# Patient Record
Sex: FEMALE | Race: OTHER | Employment: FULL TIME | URBAN - METROPOLITAN AREA
[De-identification: names, ages, dates, MRNs, and addresses within clinical notes are randomized per-mention and may not be internally consistent; named-entity substitution may affect disease eponyms.]

---

## 2017-01-18 RX ORDER — CHOLECALCIFEROL (VITAMIN D3) 1250 MCG
TABLET ORAL
Qty: 4 TABLET | Refills: 0 | OUTPATIENT
Start: 2017-01-18

## 2017-03-01 ENCOUNTER — OFFICE VISIT (OUTPATIENT)
Dept: OBGYN CLINIC | Facility: CLINIC | Age: 52
End: 2017-03-01

## 2017-03-01 VITALS
HEIGHT: 65 IN | DIASTOLIC BLOOD PRESSURE: 84 MMHG | SYSTOLIC BLOOD PRESSURE: 122 MMHG | WEIGHT: 182 LBS | BODY MASS INDEX: 30.32 KG/M2

## 2017-03-01 DIAGNOSIS — Z12.31 VISIT FOR SCREENING MAMMOGRAM: Primary | ICD-10-CM

## 2017-03-01 DIAGNOSIS — Z01.419 ENCOUNTER FOR GYNECOLOGICAL EXAMINATION WITHOUT ABNORMAL FINDING: ICD-10-CM

## 2017-03-01 PROCEDURE — 99396 PREV VISIT EST AGE 40-64: CPT | Performed by: OBSTETRICS & GYNECOLOGY

## 2017-03-02 NOTE — PROGRESS NOTES
HPI:    Patient ID: Evelyn Queen is a 49year old female. HPI  Patient here for routine exam.  No C/Os. Needs mammogram order. LPS 2015 WNL per patient. Recently tested and patient is in menopause.     Review of Systems   Constitutional: Negative Visit for screening mammogram  (primary encounter diagnosis)  Encounter for gynecological examination without abnormal finding [z01.419]   F/U Labs. Encouraged monthly SBE. Discussed diet and exercise.   No orders of the defined types were placed in [facility]

## 2017-03-10 ENCOUNTER — HOSPITAL ENCOUNTER (EMERGENCY)
Facility: HOSPITAL | Age: 52
Discharge: HOME | End: 2017-03-10
Attending: EMERGENCY MEDICINE
Payer: COMMERCIAL

## 2017-03-10 ENCOUNTER — APPOINTMENT (OUTPATIENT)
Dept: CT IMAGING | Facility: HOSPITAL | Age: 52
End: 2017-03-10
Attending: EMERGENCY MEDICINE
Payer: COMMERCIAL

## 2017-03-10 VITALS
OXYGEN SATURATION: 99 % | WEIGHT: 178 LBS | TEMPERATURE: 98 F | DIASTOLIC BLOOD PRESSURE: 80 MMHG | HEART RATE: 78 BPM | SYSTOLIC BLOOD PRESSURE: 131 MMHG | HEIGHT: 65 IN | RESPIRATION RATE: 18 BRPM | BODY MASS INDEX: 29.66 KG/M2

## 2017-03-10 DIAGNOSIS — R51.9 ACUTE NONINTRACTABLE HEADACHE, UNSPECIFIED HEADACHE TYPE: Primary | ICD-10-CM

## 2017-03-10 PROCEDURE — 70450 CT HEAD/BRAIN W/O DYE: CPT

## 2017-03-10 PROCEDURE — 96375 TX/PRO/DX INJ NEW DRUG ADDON: CPT

## 2017-03-10 PROCEDURE — 96374 THER/PROPH/DIAG INJ IV PUSH: CPT

## 2017-03-10 PROCEDURE — 99284 EMERGENCY DEPT VISIT MOD MDM: CPT

## 2017-03-10 RX ORDER — DIPHENHYDRAMINE HYDROCHLORIDE 50 MG/ML
25 INJECTION INTRAMUSCULAR; INTRAVENOUS ONCE
Status: COMPLETED | OUTPATIENT
Start: 2017-03-10 | End: 2017-03-10

## 2017-03-10 RX ORDER — BUTALBITAL, ACETAMINOPHEN AND CAFFEINE 50; 325; 40 MG/1; MG/1; MG/1
1-2 TABLET ORAL EVERY 4 HOURS PRN
Qty: 20 TABLET | Refills: 0 | Status: SHIPPED | OUTPATIENT
Start: 2017-03-10 | End: 2017-03-20

## 2017-03-10 RX ORDER — KETOROLAC TROMETHAMINE 30 MG/ML
30 INJECTION, SOLUTION INTRAMUSCULAR; INTRAVENOUS ONCE
Status: COMPLETED | OUTPATIENT
Start: 2017-03-10 | End: 2017-03-10

## 2017-03-10 RX ORDER — ONDANSETRON 2 MG/ML
4 INJECTION INTRAMUSCULAR; INTRAVENOUS ONCE
Status: COMPLETED | OUTPATIENT
Start: 2017-03-10 | End: 2017-03-10

## 2017-03-11 ENCOUNTER — HOSPITAL ENCOUNTER (OUTPATIENT)
Dept: MAMMOGRAPHY | Age: 52
Discharge: HOME | End: 2017-03-11
Attending: OBSTETRICS & GYNECOLOGY
Payer: COMMERCIAL

## 2017-03-11 DIAGNOSIS — Z12.31 VISIT FOR SCREENING MAMMOGRAM: ICD-10-CM

## 2017-03-11 PROCEDURE — 77067 SCR MAMMO BI INCL CAD: CPT

## 2017-03-11 NOTE — ED PROVIDER NOTES
Patient Seen in: [facility] AND [facility] Emergency Department    History   Patient presents with:  Headache (neurologic)      HPI    The patient presents complaining of onset of a frontal bilateral headache at 3 PM today, 3 hours ago.   Pain onset was sudden an Comment:Coffee, 1 cup daily  Pt has a pacemaker      No  Pt has a defibrillator  No  Reaction to local anes* No        Review of Systems   Constitutional: Negative for fever and chills. HENT: Negative for congestion and sore throat.     Eyes: Negative for dry. No rash noted. Nursing note and vitals reviewed. ED Course        Labs Reviewed   RAINBOW DRAW LAVENDER   RAINBOW DRAW LIGHT GREEN         Ct Brain Or Head (68840)    3/10/2017  CONCLUSION:  1. Empty sella as seen on multiple previous studies.

## 2017-03-20 ENCOUNTER — OFFICE VISIT (OUTPATIENT)
Dept: FAMILY MEDICINE CLINIC | Facility: CLINIC | Age: 52
End: 2017-03-20

## 2017-03-20 VITALS
HEIGHT: 65 IN | RESPIRATION RATE: 16 BRPM | HEART RATE: 97 BPM | TEMPERATURE: 98 F | SYSTOLIC BLOOD PRESSURE: 133 MMHG | BODY MASS INDEX: 30.05 KG/M2 | DIASTOLIC BLOOD PRESSURE: 88 MMHG | WEIGHT: 180.38 LBS

## 2017-03-20 DIAGNOSIS — J34.89 SINUS MUCOSAL THICKENING: ICD-10-CM

## 2017-03-20 DIAGNOSIS — M62.838 MUSCLE SPASMS OF NECK: ICD-10-CM

## 2017-03-20 DIAGNOSIS — Z00.00 ROUTINE MEDICAL EXAM: Primary | ICD-10-CM

## 2017-03-20 PROCEDURE — 99396 PREV VISIT EST AGE 40-64: CPT | Performed by: FAMILY MEDICINE

## 2017-03-20 RX ORDER — FLUTICASONE PROPIONATE 50 MCG
2 SPRAY, SUSPENSION (ML) NASAL DAILY
Qty: 1 INHALER | Refills: 2 | Status: SHIPPED | OUTPATIENT
Start: 2017-03-20 | End: 2017-03-20

## 2017-03-20 RX ORDER — PANTOPRAZOLE SODIUM 40 MG/1
40 TABLET, DELAYED RELEASE ORAL
Refills: 4 | COMMUNITY
Start: 2017-02-09 | End: 2017-04-11

## 2017-03-20 RX ORDER — MONTELUKAST SODIUM 10 MG/1
10 TABLET ORAL DAILY
Qty: 90 TABLET | Refills: 3 | Status: SHIPPED | OUTPATIENT
Start: 2017-03-20 | End: 2017-05-22

## 2017-03-20 RX ORDER — CYCLOBENZAPRINE HCL 5 MG
5 TABLET ORAL NIGHTLY
Qty: 30 TABLET | Refills: 0 | Status: SHIPPED | OUTPATIENT
Start: 2017-03-20 | End: 2017-07-11

## 2017-03-20 NOTE — PROGRESS NOTES
HPI:   Corrina Downey is a 49year old female who presents for a complete physical exam.    Had to go to ER for severe headache 2 weeks ago. Pt is not taking anything for it and continues to have low dose headache.  Has not been exercising like before - h 07/24/2012    Smokeless Status: Never Used                        Alcohol Use: Yes           0.0 oz/week       0 Standard drinks or equivalent per week       Comment: On Occasion         REVIEW OF SYSTEMS:   GENERAL: feels well otherwise  SKIN: denies any singulair and claritin.      3. Muscle spasms of neck  Flexeril prn in evenings          Miriam Lora MD  3/20/2017  3:28 PM

## 2017-03-21 RX ORDER — FLUTICASONE PROPIONATE 50 MCG
SPRAY, SUSPENSION (ML) NASAL
Qty: 3 INHALER | Refills: 2 | Status: SHIPPED | OUTPATIENT
Start: 2017-03-21 | End: 2017-04-11

## 2017-03-25 ENCOUNTER — LAB ENCOUNTER (OUTPATIENT)
Dept: LAB | Age: 52
End: 2017-03-25
Attending: FAMILY MEDICINE
Payer: COMMERCIAL

## 2017-03-25 DIAGNOSIS — Z00.00 ROUTINE MEDICAL EXAM: ICD-10-CM

## 2017-03-25 LAB
ALBUMIN SERPL BCP-MCNC: 3.7 G/DL (ref 3.5–4.8)
ALBUMIN/GLOB SERPL: 1.2 {RATIO} (ref 1–2)
ALP SERPL-CCNC: 80 U/L (ref 32–100)
ALT SERPL-CCNC: 15 U/L (ref 14–54)
ANION GAP SERPL CALC-SCNC: 9 MMOL/L (ref 0–18)
AST SERPL-CCNC: 19 U/L (ref 15–41)
BASOPHILS # BLD: 0.1 K/UL (ref 0–0.2)
BASOPHILS NFR BLD: 1 %
BILIRUB SERPL-MCNC: 1 MG/DL (ref 0.3–1.2)
BUN SERPL-MCNC: 13 MG/DL (ref 8–20)
BUN/CREAT SERPL: 18.6 (ref 10–20)
CALCIUM SERPL-MCNC: 8.8 MG/DL (ref 8.5–10.5)
CHLORIDE SERPL-SCNC: 102 MMOL/L (ref 95–110)
CHOLEST SERPL-MCNC: 238 MG/DL (ref 110–200)
CO2 SERPL-SCNC: 26 MMOL/L (ref 22–32)
CREAT SERPL-MCNC: 0.7 MG/DL (ref 0.5–1.5)
EOSINOPHIL # BLD: 0.2 K/UL (ref 0–0.7)
EOSINOPHIL NFR BLD: 3 %
ERYTHROCYTE [DISTWIDTH] IN BLOOD BY AUTOMATED COUNT: 13.1 % (ref 11–15)
GLOBULIN PLAS-MCNC: 3.1 G/DL (ref 2.5–3.7)
GLUCOSE SERPL-MCNC: 80 MG/DL (ref 70–99)
HCT VFR BLD AUTO: 41.5 % (ref 35–48)
HDLC SERPL-MCNC: 55 MG/DL
HGB BLD-MCNC: 13.7 G/DL (ref 12–16)
LDLC SERPL CALC-MCNC: 157 MG/DL (ref 0–99)
LYMPHOCYTES # BLD: 2.9 K/UL (ref 1–4)
LYMPHOCYTES NFR BLD: 43 %
MCH RBC QN AUTO: 27.9 PG (ref 27–32)
MCHC RBC AUTO-ENTMCNC: 33.1 G/DL (ref 32–37)
MCV RBC AUTO: 84.3 FL (ref 80–100)
MONOCYTES # BLD: 0.5 K/UL (ref 0–1)
MONOCYTES NFR BLD: 8 %
NEUTROPHILS # BLD AUTO: 3.1 K/UL (ref 1.8–7.7)
NEUTROPHILS NFR BLD: 46 %
NONHDLC SERPL-MCNC: 183 MG/DL
OSMOLALITY UR CALC.SUM OF ELEC: 283 MOSM/KG (ref 275–295)
PLATELET # BLD AUTO: 282 K/UL (ref 140–400)
PMV BLD AUTO: 8 FL (ref 7.4–10.3)
POTASSIUM SERPL-SCNC: 4.2 MMOL/L (ref 3.3–5.1)
PROT SERPL-MCNC: 6.8 G/DL (ref 5.9–8.4)
RBC # BLD AUTO: 4.92 M/UL (ref 3.7–5.4)
SODIUM SERPL-SCNC: 137 MMOL/L (ref 136–144)
TRIGL SERPL-MCNC: 129 MG/DL (ref 1–149)
TSH SERPL-ACNC: 0.62 UIU/ML (ref 0.34–5.6)
VIT B12 SERPL-MCNC: 382 PG/ML (ref 181–914)
WBC # BLD AUTO: 6.8 K/UL (ref 4–11)

## 2017-03-25 PROCEDURE — 85025 COMPLETE CBC W/AUTO DIFF WBC: CPT

## 2017-03-25 PROCEDURE — 80053 COMPREHEN METABOLIC PANEL: CPT

## 2017-03-25 PROCEDURE — 36415 COLL VENOUS BLD VENIPUNCTURE: CPT

## 2017-03-25 PROCEDURE — 84443 ASSAY THYROID STIM HORMONE: CPT

## 2017-03-25 PROCEDURE — 80061 LIPID PANEL: CPT

## 2017-03-25 PROCEDURE — 82607 VITAMIN B-12: CPT

## 2017-03-25 PROCEDURE — 82306 VITAMIN D 25 HYDROXY: CPT

## 2017-03-27 LAB — 25(OH)D3 SERPL-MCNC: 38 NG/ML

## 2017-03-29 NOTE — PROGRESS NOTES
Quick Note:    Labs are good except significantly elevated cholesterol. Need to change diet and start exercising 4-5 times a week 30-45 minutes. . Decrease food portions. No fast foods - stick with fresh fruits/veggies, chicken and fish.  Can also take fish

## 2017-04-11 ENCOUNTER — TELEPHONE (OUTPATIENT)
Dept: NEUROLOGY | Facility: CLINIC | Age: 52
End: 2017-04-11

## 2017-04-11 ENCOUNTER — OFFICE VISIT (OUTPATIENT)
Dept: NEUROLOGY | Facility: CLINIC | Age: 52
End: 2017-04-11

## 2017-04-11 VITALS
WEIGHT: 180 LBS | SYSTOLIC BLOOD PRESSURE: 118 MMHG | HEIGHT: 65 IN | DIASTOLIC BLOOD PRESSURE: 86 MMHG | BODY MASS INDEX: 29.99 KG/M2 | RESPIRATION RATE: 16 BRPM | HEART RATE: 80 BPM

## 2017-04-11 DIAGNOSIS — G44.89 OTHER HEADACHE SYNDROME: Primary | ICD-10-CM

## 2017-04-11 PROCEDURE — 99244 OFF/OP CNSLTJ NEW/EST MOD 40: CPT | Performed by: OTHER

## 2017-04-11 RX ORDER — INDOMETHACIN 50 MG/1
CAPSULE ORAL
Qty: 30 CAPSULE | Refills: 1 | Status: SHIPPED | OUTPATIENT
Start: 2017-04-11 | End: 2017-07-11

## 2017-04-11 NOTE — TELEPHONE ENCOUNTER
[employer] for authorization of approval for MRA brain wo. Approval was given with Authorization # [id number] effective 04/11/17 to 05/10/17    Will call Pt. to inform. Pt. Informed of approval. States she is scheduled for MRA on 04/14 at [address] [place] [address].

## 2017-04-14 ENCOUNTER — TELEPHONE (OUTPATIENT)
Dept: NEUROLOGY | Facility: CLINIC | Age: 52
End: 2017-04-14

## 2017-04-14 ENCOUNTER — HOSPITAL ENCOUNTER (OUTPATIENT)
Dept: MRI IMAGING | Age: 52
Discharge: HOME | End: 2017-04-14
Attending: Other
Payer: COMMERCIAL

## 2017-04-14 DIAGNOSIS — G44.89 OTHER HEADACHE SYNDROME: ICD-10-CM

## 2017-04-14 PROCEDURE — 70544 MR ANGIOGRAPHY HEAD W/O DYE: CPT

## 2017-04-14 NOTE — PROGRESS NOTES
Neurology Outpatient Consult Note    Daphne Burton : 1965   Referring Physician: Dr. Eros Stanley  HPI:     Daphne Burton is a 49year old female who is being seen in neurologic evaluation.     Patient is being seen in evaluation for headach 90 tablet Rfl: 3      Past Medical History   Diagnosis Date   • Other ill-defined conditions      per NextGen:  \"Abnormal pap smear; Management:  colposcopy x3\"   • Vitamin D deficiency    • Lipid screening 5/17/2014     per NG   • Biliary calculus Lungs: clear to auscultation bilaterally  HEENT/neck: No tenderness to palpation over cervical paraspinals or occipital notches  Neuro:  Mental Status: alert, speech fluent and appropriate       Cranial Nerves: pupils equal, round, and reactive to light;

## 2017-04-14 NOTE — TELEPHONE ENCOUNTER
Please call patient and let her know MRA showed some mild sinus disease, otherwise normal.  No aneurysm.

## 2017-04-14 NOTE — TELEPHONE ENCOUNTER
Spoke to patient. Gave her information from Dr Wright`s note below 4/14/17. Patient advised to contact Dr Jonatan Villatoro if needs any further care for mild sinus disease. Patient expressed understanding.

## 2017-05-22 ENCOUNTER — OFFICE VISIT (OUTPATIENT)
Dept: FAMILY MEDICINE CLINIC | Facility: CLINIC | Age: 52
End: 2017-05-22

## 2017-05-22 VITALS
WEIGHT: 183 LBS | BODY MASS INDEX: 30.49 KG/M2 | RESPIRATION RATE: 12 BRPM | DIASTOLIC BLOOD PRESSURE: 81 MMHG | HEART RATE: 83 BPM | TEMPERATURE: 97 F | HEIGHT: 65 IN | SYSTOLIC BLOOD PRESSURE: 116 MMHG

## 2017-05-22 DIAGNOSIS — M77.8 SHOULDER TENDONITIS, RIGHT: Primary | ICD-10-CM

## 2017-05-22 DIAGNOSIS — R20.2 PARESTHESIA: ICD-10-CM

## 2017-05-22 PROCEDURE — 99214 OFFICE O/P EST MOD 30 MIN: CPT | Performed by: FAMILY MEDICINE

## 2017-05-22 PROCEDURE — 99212 OFFICE O/P EST SF 10 MIN: CPT | Performed by: FAMILY MEDICINE

## 2017-05-22 RX ORDER — NAPROXEN 500 MG/1
500 TABLET ORAL 2 TIMES DAILY
Qty: 20 TABLET | Refills: 0 | Status: SHIPPED | OUTPATIENT
Start: 2017-05-22 | End: 2017-05-22

## 2017-05-22 NOTE — PROGRESS NOTES
Princess Vegas is a 49year old female. Patient presents with:  Pain: right arm/shoulder     HPI:   1 year off and on with pain in right shoulder. Pt was seen in 6/2016 for it and improved with NSAIDs. Patient does use her arms overhead for her job. Pulse 83  Temp(Src) 97.4 °F (36.3 °C) (Oral)  Resp 12  Ht 5' 5\" (1.651 m)  Wt 183 lb (83.008 kg)  BMI 30.45 kg/m2  GENERAL: well developed, well nourished,in no apparent distress  SKIN: no rashes,no suspicious lesions  EXTREMITIES: no cyanosis, clubbing

## 2017-05-27 ENCOUNTER — HOSPITAL ENCOUNTER (OUTPATIENT)
Dept: GENERAL RADIOLOGY | Facility: HOSPITAL | Age: 52
Discharge: HOME | End: 2017-05-27
Attending: FAMILY MEDICINE
Payer: COMMERCIAL

## 2017-05-27 DIAGNOSIS — R20.2 PARESTHESIA: ICD-10-CM

## 2017-05-27 DIAGNOSIS — M77.8 SHOULDER TENDONITIS, RIGHT: ICD-10-CM

## 2017-05-27 PROCEDURE — 73030 X-RAY EXAM OF SHOULDER: CPT | Performed by: FAMILY MEDICINE

## 2017-05-27 PROCEDURE — 72040 X-RAY EXAM NECK SPINE 2-3 VW: CPT | Performed by: FAMILY MEDICINE

## 2017-05-27 NOTE — TELEPHONE ENCOUNTER
LBB pls advise if request for 90 day supply for naproxen appropriate? LOV 5/22/17  ASSESSMENT AND PLAN:    1. Shoulder tendonitis, right  Naproxen BID and icing daily. Referrals for PT and x-rays.    - XR SHOULDER, COMPLETE (MIN 2 VIEWS), RIGHT (CPT=7303

## 2017-05-29 RX ORDER — NAPROXEN 500 MG/1
TABLET ORAL
Qty: 180 TABLET | Refills: 0 | Status: SHIPPED | OUTPATIENT
Start: 2017-05-29 | End: 2017-06-21

## 2017-05-29 RX ORDER — PANTOPRAZOLE SODIUM 40 MG/1
TABLET, DELAYED RELEASE ORAL
Qty: 30 TABLET | Refills: 0 | Status: SHIPPED | OUTPATIENT
Start: 2017-05-29 | End: 2017-06-21 | Stop reason: DRUGHIGH

## 2017-05-30 NOTE — TELEPHONE ENCOUNTER
GI RNs -  Rx sent in.   Please call Ms Erickson Ram to arrange office visit with either myself or Kamini within the next 3 months

## 2017-05-30 NOTE — PROGRESS NOTES
Quick Note:    Very mild arthritis within cervical spine. Please go to Physical Therapy as discussed.   ______

## 2017-06-05 ENCOUNTER — OFFICE VISIT (OUTPATIENT)
Dept: PHYSICAL THERAPY | Age: 52
End: 2017-06-05
Attending: FAMILY MEDICINE
Payer: COMMERCIAL

## 2017-06-05 DIAGNOSIS — R20.2 PARESTHESIA: Primary | ICD-10-CM

## 2017-06-05 DIAGNOSIS — M77.8 SHOULDER TENDONITIS, RIGHT: ICD-10-CM

## 2017-06-05 PROCEDURE — 97110 THERAPEUTIC EXERCISES: CPT

## 2017-06-05 PROCEDURE — 97161 PT EVAL LOW COMPLEX 20 MIN: CPT

## 2017-06-05 NOTE — PROGRESS NOTES
UPPER EXTREMITY EVALUATION:   Referring Physician: Dr. Brady Arita  Diagnosis: Paresthesia (R20.2)  Shoulder tendonitis, right (M75.81)  Date of Onset: 1year ago Date of Service: 6/5/2017     PATIENT Shama Vu is a 49year old y/o female who p history was reviewed with [facility]. Significant findings include: none         ASSESSMENT:   Michaela Oro is a 49year old y/o female who presents to PT with c/o right shoulder pain that radiates down arm to fingertips.  Numbness/tingling also present unable to achieve testing position on R due to pain  Cross-Arm: unable to achieve testing position on R due to pain  Drop Arm: (-) although painful    Palpation: TTP at [facility] joint line    Sensation: Decreased light tough to C4, C5, C7, T1 on R vs L     Today actively participate in planning and for this course of care. Thank you for your referral. Please co-sign or sign and return this letter via fax as soon as possible to [phone].  If you have any questions, please contact me at Dept: [phone]    S

## 2017-06-07 ENCOUNTER — OFFICE VISIT (OUTPATIENT)
Dept: PHYSICAL THERAPY | Age: 52
End: 2017-06-07
Attending: FAMILY MEDICINE
Payer: COMMERCIAL

## 2017-06-07 PROCEDURE — 97110 THERAPEUTIC EXERCISES: CPT

## 2017-06-07 NOTE — PROGRESS NOTES
Dx: paresthesia of skin                                Next MD visit: none scheduled  Fall Risk: standard         Precautions: n/a           Medications: no  Subjective: patient reports pain in her right shoulder with horizontal adduction and with lifting scapula retraction . Plan: continue per POC and advance as able.     Charges: ex's 3       Total Timed Treatment: 40 min  Total Treatment Time: 45 min

## 2017-06-12 ENCOUNTER — OFFICE VISIT (OUTPATIENT)
Dept: PHYSICAL THERAPY | Age: 52
End: 2017-06-12
Attending: FAMILY MEDICINE
Payer: COMMERCIAL

## 2017-06-12 PROCEDURE — 97110 THERAPEUTIC EXERCISES: CPT

## 2017-06-12 PROCEDURE — 97140 MANUAL THERAPY 1/> REGIONS: CPT

## 2017-06-12 NOTE — PROGRESS NOTES
Dx: Paresthesia (R20.2)  Shoulder tendonitis, right (M75.81)                              Next MD visit: none scheduled  Fall Risk: standard         Precautions: n/a           Medications: no  Subjective: patient reports pain in her right shoulder with hor worst to be able to reach into overhead cabinets and reach across body to don/doff clothing  3.  Pt will demo improved R shoulder strength to at least 4+/5 for all deficit motions with pain 1/10 at worst to be able to lift/carry household objects with incre

## 2017-06-14 ENCOUNTER — APPOINTMENT (OUTPATIENT)
Dept: PHYSICAL THERAPY | Age: 52
End: 2017-06-14
Attending: FAMILY MEDICINE
Payer: COMMERCIAL

## 2017-06-19 ENCOUNTER — TELEPHONE (OUTPATIENT)
Dept: GASTROENTEROLOGY | Facility: CLINIC | Age: 52
End: 2017-06-19

## 2017-06-20 ENCOUNTER — OFFICE VISIT (OUTPATIENT)
Dept: PHYSICAL THERAPY | Age: 52
End: 2017-06-20
Attending: FAMILY MEDICINE
Payer: COMMERCIAL

## 2017-06-20 PROCEDURE — 97110 THERAPEUTIC EXERCISES: CPT

## 2017-06-20 NOTE — PROGRESS NOTES
Dx: Paresthesia (R20.2)  Shoulder tendonitis, right (M75.81)                              Next MD visit: none scheduled  Fall Risk: standard         Precautions: n/a           Medications: no  Subjective: patient reports that her neck feels good but her ri shoulder with symptoms abolished and ROM of flexion and abduction improved. Goals     • Therapy Goals      1. Pt will verbalize and demo compliance with HEP at least 75% of the time to allow for independent management of symptoms at discharge  2.  Pt wi

## 2017-06-21 ENCOUNTER — APPOINTMENT (OUTPATIENT)
Dept: LAB | Facility: HOSPITAL | Age: 52
End: 2017-06-21
Attending: PHYSICIAN ASSISTANT
Payer: COMMERCIAL

## 2017-06-21 ENCOUNTER — TELEPHONE (OUTPATIENT)
Dept: GASTROENTEROLOGY | Facility: CLINIC | Age: 52
End: 2017-06-21

## 2017-06-21 ENCOUNTER — OFFICE VISIT (OUTPATIENT)
Dept: GASTROENTEROLOGY | Facility: CLINIC | Age: 52
End: 2017-06-21

## 2017-06-21 VITALS
WEIGHT: 184.38 LBS | BODY MASS INDEX: 30.72 KG/M2 | HEIGHT: 65 IN | HEART RATE: 75 BPM | DIASTOLIC BLOOD PRESSURE: 84 MMHG | SYSTOLIC BLOOD PRESSURE: 129 MMHG

## 2017-06-21 DIAGNOSIS — Z79.899 CURRENT USE OF PROTON PUMP INHIBITOR: ICD-10-CM

## 2017-06-21 DIAGNOSIS — R12 HEARTBURN: Primary | ICD-10-CM

## 2017-06-21 PROCEDURE — 83735 ASSAY OF MAGNESIUM: CPT

## 2017-06-21 PROCEDURE — 36415 COLL VENOUS BLD VENIPUNCTURE: CPT

## 2017-06-21 PROCEDURE — 99214 OFFICE O/P EST MOD 30 MIN: CPT | Performed by: PHYSICIAN ASSISTANT

## 2017-06-21 PROCEDURE — 82607 VITAMIN B-12: CPT

## 2017-06-21 PROCEDURE — 99212 OFFICE O/P EST SF 10 MIN: CPT | Performed by: PHYSICIAN ASSISTANT

## 2017-06-21 PROCEDURE — 82306 VITAMIN D 25 HYDROXY: CPT

## 2017-06-21 RX ORDER — PANTOPRAZOLE SODIUM 40 MG/1
40 TABLET, DELAYED RELEASE ORAL
Qty: 90 TABLET | Refills: 3 | Status: SHIPPED | OUTPATIENT
Start: 2017-06-21 | End: 2017-09-20 | Stop reason: ALTCHOICE

## 2017-06-21 NOTE — TELEPHONE ENCOUNTER
Current Outpatient Prescriptions:  Pantoprazole Sodium 40 MG Oral Tab EC Take 1 tablet (40 mg total) by mouth every morning before breakfast. Disp: 90 tablet Rfl: 3     PA request

## 2017-06-21 NOTE — PATIENT INSTRUCTIONS
1. Take the Pantoprazole each day that you take Ibuprofen. Try to take it at least 30 minutes prior to breakfast.  2. Labs today for long-term proton pump inhibitor use. 3. I will discuss with Dr. Montez Morales regarding colonoscopy recall.   4. Avoid heartburn t

## 2017-06-21 NOTE — TELEPHONE ENCOUNTER
Spoke with pharmacy to obtain correct phone number to call for PA. Per pharmacist no PA required at this time, rx is going through.   May disregard request.

## 2017-06-21 NOTE — PROGRESS NOTES
[facility] Follow-up Visit    Ilene father at home  - Occupation:      History, Medications, Allergies, ROS:      Past Medical History   Diagnosis Date   • Other ill-defined conditions(799.89)      per NextGen:  \"Abnormal pap smear; Management:  colposcopy x3\"   • negative for fevers, chills  EYES:  negative for change in vision +glasses   RESPIRATORY:  negative for  shortness of breath  CARDIOVASCULAR:  negative for  chest pain  GASTROINTESTINAL:  see HPI  GENITOURINARY:  negative for dysuria and hematuria   SKIN: bile stent in March 2014 and elective outpatient c-scope by Dr. Virginia Marcus (unremarkable upper endoscopy and c-scope). Last OV 03/2016 patient was prescribed Pantoprazole and trial of dicyclomine. She takes PPI as needed now, no bentyl.  Occasionally has abdomin

## 2017-06-26 ENCOUNTER — OFFICE VISIT (OUTPATIENT)
Dept: PHYSICAL THERAPY | Age: 52
End: 2017-06-26
Attending: FAMILY MEDICINE
Payer: COMMERCIAL

## 2017-06-26 PROCEDURE — 97140 MANUAL THERAPY 1/> REGIONS: CPT

## 2017-06-26 PROCEDURE — 97110 THERAPEUTIC EXERCISES: CPT

## 2017-06-26 NOTE — TELEPHONE ENCOUNTER
I reach the patient to discuss the results of her labs with her. Vitamin D is mildly low and I recommended 1-2000 international units daily and an over-the-counter supplement. Patient is amenable to this recommendation and appreciative of the call.   No f

## 2017-06-26 NOTE — PROGRESS NOTES
Dx: Paresthesia (R20.2)  Shoulder tendonitis, right (M75.81)                              Next MD visit: none scheduled  Fall Risk: standard         Precautions: n/a           Medications: no  Subjective: Patient reports shoulder feels better during the da retract + OP Standing : extension with focus on eccentric control with YTB  X 10 Standing shldr ext with RTB 2x10x5\"   Standing : AAROM of IR  X 10 Seated median and ulnar nerve glides  Standing repeated ER/IR with RTB 2x10 R/L   Standing : rows and shoul

## 2017-06-29 ENCOUNTER — OFFICE VISIT (OUTPATIENT)
Dept: PHYSICAL THERAPY | Age: 52
End: 2017-06-29
Attending: FAMILY MEDICINE
Payer: COMMERCIAL

## 2017-06-29 PROCEDURE — 97140 MANUAL THERAPY 1/> REGIONS: CPT

## 2017-06-29 PROCEDURE — 97110 THERAPEUTIC EXERCISES: CPT

## 2017-06-29 NOTE — PROGRESS NOTES
Patient Name: Haroon Salomon, : 1965, MRN: [id number]   Date:  2017  Referring Physician:  Dr. Miriam Lora  Diagnosis: Paresthesia (R20.2)  Shoulder tendonitis, right (M75.81)     Discharge Summary    Pt has attended 6 visits in [address] [place] [address] L 5/5  Low trap: R 4-/5; L 4/5       Special tests:   Neer's Impingement: minimal pain at 140deg elevation  Rebecca-Duc: (+) for pain  Cross-Arm: minimal pain  Drop Arm: (-) although painful     Palpation: TTP at post GHJ      Sensation: Decreased ligh

## 2017-07-03 ENCOUNTER — TELEPHONE (OUTPATIENT)
Dept: GASTROENTEROLOGY | Facility: CLINIC | Age: 52
End: 2017-07-03

## 2017-07-03 NOTE — TELEPHONE ENCOUNTER
Discussed with Dr. Marti Bahena regarding this patient's care. He recommends repeat colonoscopy March 2018 for screening purposes.   I would like her to set up a six-month follow-up with me (December/January) to see how her upper symptoms are doing and to perhap

## 2017-07-10 ENCOUNTER — HOSPITAL ENCOUNTER (OUTPATIENT)
Age: 52
Discharge: HOME | End: 2017-07-10
Attending: EMERGENCY MEDICINE
Payer: COMMERCIAL

## 2017-07-10 ENCOUNTER — TELEPHONE (OUTPATIENT)
Dept: INTERNAL MEDICINE CLINIC | Facility: CLINIC | Age: 52
End: 2017-07-10

## 2017-07-10 VITALS
BODY MASS INDEX: 28.93 KG/M2 | HEIGHT: 66 IN | HEART RATE: 76 BPM | DIASTOLIC BLOOD PRESSURE: 79 MMHG | TEMPERATURE: 98 F | SYSTOLIC BLOOD PRESSURE: 131 MMHG | OXYGEN SATURATION: 98 % | RESPIRATION RATE: 18 BRPM | WEIGHT: 180 LBS

## 2017-07-10 DIAGNOSIS — R42 VERTIGO: Primary | ICD-10-CM

## 2017-07-10 PROCEDURE — 99214 OFFICE O/P EST MOD 30 MIN: CPT

## 2017-07-10 PROCEDURE — 99213 OFFICE O/P EST LOW 20 MIN: CPT

## 2017-07-10 RX ORDER — MECLIZINE HYDROCHLORIDE CHEWABLE TABLETS 25 MG/1
25 TABLET, CHEWABLE ORAL 3 TIMES DAILY
Qty: 15 TABLET | Refills: 0 | Status: SHIPPED | OUTPATIENT
Start: 2017-07-10 | End: 2017-07-11

## 2017-07-10 NOTE — ED PROVIDER NOTES
Patient Seen in: [facility]    History   Patient presents with:  Dizziness (neurologic)    Stated Complaint: dizzy all day    HPI    Patient is a 25-year-old female that woke up this morning with the room spinning dizzine CA   • Cancer Mother      Uterus Cancer; age 36 cause of death   • migraine[other] [OTHER] Sister        Smoking status: Former Smoker                                                              Packs/day: 0.10      Years: 0.00         Types: Cigarettes reflexes. No cranial nerve deficit. No motor os sensory defecits noted Coordination normal.   Skin: Skin is warm and dry. Psychiatric: Normal mood and affect. Behavior is normal. Judgment and thought content normal.   Nursing note and vitals reviewed.

## 2017-07-10 NOTE — TELEPHONE ENCOUNTER
Actions Requested: wants appt today to be seen by PCP  Requesting add on.   Please advise  Problem: dizzy  Onset and Timing: this morning since she woke up today at 2 am  Associated Symptoms: did not take BP,  + nausea denies Vomit,  + balance issue or ring

## 2017-07-10 NOTE — ED INITIAL ASSESSMENT (HPI)
Patient complains of intermittant dizziness since 2am.  Nausea only when feeling like the \"room is moving\"   No other complaints. No facial droop. Speech clear.

## 2017-07-10 NOTE — TELEPHONE ENCOUNTER
Reviewed Dr Damien Desai message with patient and verbalized understanding. Scheduled appt 7/11/17 @ 11:30am with Dr Damien Desai.

## 2017-07-10 NOTE — TELEPHONE ENCOUNTER
Spoke to pt. Appts offered and made for 12/27/17 at 1300. Pt verbalized understanding with agreement to appt scheduled. Pt had no further questions or requests at this time.

## 2017-07-11 ENCOUNTER — OFFICE VISIT (OUTPATIENT)
Dept: FAMILY MEDICINE CLINIC | Facility: CLINIC | Age: 52
End: 2017-07-11

## 2017-07-11 VITALS
HEART RATE: 81 BPM | SYSTOLIC BLOOD PRESSURE: 96 MMHG | WEIGHT: 182.63 LBS | DIASTOLIC BLOOD PRESSURE: 60 MMHG | BODY MASS INDEX: 29 KG/M2

## 2017-07-11 DIAGNOSIS — G89.29 CHRONIC RIGHT SHOULDER PAIN: ICD-10-CM

## 2017-07-11 DIAGNOSIS — R42 VERTIGO: Primary | ICD-10-CM

## 2017-07-11 DIAGNOSIS — M25.511 CHRONIC RIGHT SHOULDER PAIN: ICD-10-CM

## 2017-07-11 PROCEDURE — 99212 OFFICE O/P EST SF 10 MIN: CPT | Performed by: FAMILY MEDICINE

## 2017-07-11 PROCEDURE — 99214 OFFICE O/P EST MOD 30 MIN: CPT | Performed by: FAMILY MEDICINE

## 2017-07-11 RX ORDER — PREDNISONE 20 MG/1
TABLET ORAL
Qty: 10 TABLET | Refills: 0 | Status: SHIPPED | OUTPATIENT
Start: 2017-07-11 | End: 2017-09-20 | Stop reason: ALTCHOICE

## 2017-07-11 RX ORDER — MECLIZINE HYDROCHLORIDE CHEWABLE TABLETS 25 MG/1
TABLET, CHEWABLE ORAL
COMMUNITY
End: 2017-09-20 | Stop reason: ALTCHOICE

## 2017-07-11 NOTE — PROGRESS NOTES
Heriberto Welsh is a 49year old female. Patient presents with:  Vertigo: [facility]     HPI:   Yesterday woke with extreme dizziness - could not walk straight. No recent travel and no air plane rides. Denies any recent cold symptoms.  Went to arm)   Pulse 81   Wt 182 lb 9.6 oz (82.8 kg)   BMI 29.47 kg/m²   GENERAL: well developed, well nourished,in no apparent distress  SKIN: no rashes,no suspicious lesions  HEENT: atraumatic, normocephalic,ears and throat are clear  NECK: supple,no adenopathy,

## 2017-09-20 ENCOUNTER — OFFICE VISIT (OUTPATIENT)
Dept: FAMILY MEDICINE CLINIC | Facility: CLINIC | Age: 52
End: 2017-09-20

## 2017-09-20 VITALS
BODY MASS INDEX: 30 KG/M2 | WEIGHT: 184 LBS | DIASTOLIC BLOOD PRESSURE: 76 MMHG | TEMPERATURE: 98 F | SYSTOLIC BLOOD PRESSURE: 115 MMHG | HEART RATE: 78 BPM

## 2017-09-20 DIAGNOSIS — H65.91 OTITIS MEDIA WITH EFFUSION, RIGHT: Primary | ICD-10-CM

## 2017-09-20 PROCEDURE — 99212 OFFICE O/P EST SF 10 MIN: CPT | Performed by: FAMILY MEDICINE

## 2017-09-20 PROCEDURE — 99213 OFFICE O/P EST LOW 20 MIN: CPT | Performed by: FAMILY MEDICINE

## 2017-09-20 RX ORDER — AMOXICILLIN AND CLAVULANATE POTASSIUM 875; 125 MG/1; MG/1
1 TABLET, FILM COATED ORAL 2 TIMES DAILY
Qty: 20 TABLET | Refills: 0 | Status: SHIPPED | OUTPATIENT
Start: 2017-09-20 | End: 2017-09-30

## 2017-09-20 NOTE — PROGRESS NOTES
HPI:   Get Yadav is a 47year old female who presents for upper respiratory symptoms for  1  weeks. Patient reports dry cough, chest pain from coughing, ear pain. Taking no medications for this.        Current Outpatient Prescriptions on File Prior t atraumatic, normocephalic,ears and throat are clear. right TM moderate effusion - flat, erythematous. Left mild effusion. NECK: supple,no adenopathy,no bruits  LUNGS: clear to auscultation  CARDIO: RRR without murmur      ASSESSMENT AND PLAN:   1.  Otitis

## 2017-10-03 ENCOUNTER — OFFICE VISIT (OUTPATIENT)
Dept: OBGYN CLINIC | Facility: CLINIC | Age: 52
End: 2017-10-03

## 2017-10-03 ENCOUNTER — TELEPHONE (OUTPATIENT)
Dept: OTHER | Age: 52
End: 2017-10-03

## 2017-10-03 VITALS — HEIGHT: 64 IN | WEIGHT: 186 LBS | BODY MASS INDEX: 31.76 KG/M2

## 2017-10-03 DIAGNOSIS — N89.8 VAGINAL DISCHARGE: Primary | ICD-10-CM

## 2017-10-03 PROCEDURE — 99213 OFFICE O/P EST LOW 20 MIN: CPT | Performed by: ADVANCED PRACTICE MIDWIFE

## 2017-10-03 RX ORDER — FLUCONAZOLE 150 MG/1
150 TABLET ORAL ONCE
Qty: 2 TABLET | Refills: 0 | Status: SHIPPED | OUTPATIENT
Start: 2017-10-03 | End: 2017-10-03

## 2017-10-03 RX ORDER — PREDNISONE 20 MG/1
40 TABLET ORAL DAILY
Qty: 10 TABLET | Refills: 0 | Status: SHIPPED | OUTPATIENT
Start: 2017-10-03 | End: 2017-10-08

## 2017-10-03 NOTE — PROGRESS NOTES
S.  Here for sx of burning outside the vaginal opening. Noticed a rash on her lower abdomen on Sunday. Recently changed soap. Now the rash on her abdomen is gone. But still has irritation. Was recently treated with amoxicillin for a sinus infection.

## 2017-10-03 NOTE — TELEPHONE ENCOUNTER
Please advise -Pt stated she was seen 9/20/17 but have not improve-s/s worse right don't have much pain but feel a lot of pressure in ear/below -very uncomfortable-finished the ABX

## 2017-10-24 ENCOUNTER — LAB ENCOUNTER (OUTPATIENT)
Dept: LAB | Age: 52
End: 2017-10-24
Attending: FAMILY MEDICINE
Payer: COMMERCIAL

## 2017-10-24 ENCOUNTER — OFFICE VISIT (OUTPATIENT)
Dept: FAMILY MEDICINE CLINIC | Facility: CLINIC | Age: 52
End: 2017-10-24

## 2017-10-24 VITALS
HEIGHT: 64 IN | WEIGHT: 189.69 LBS | TEMPERATURE: 98 F | HEART RATE: 86 BPM | SYSTOLIC BLOOD PRESSURE: 132 MMHG | RESPIRATION RATE: 12 BRPM | BODY MASS INDEX: 32.38 KG/M2 | DIASTOLIC BLOOD PRESSURE: 92 MMHG

## 2017-10-24 DIAGNOSIS — J32.0 CHRONIC MAXILLARY SINUSITIS: Primary | ICD-10-CM

## 2017-10-24 DIAGNOSIS — R51.9 TEMPLE TENDERNESS: ICD-10-CM

## 2017-10-24 PROCEDURE — 85025 COMPLETE CBC W/AUTO DIFF WBC: CPT

## 2017-10-24 PROCEDURE — 99212 OFFICE O/P EST SF 10 MIN: CPT | Performed by: FAMILY MEDICINE

## 2017-10-24 PROCEDURE — 86140 C-REACTIVE PROTEIN: CPT

## 2017-10-24 PROCEDURE — 99214 OFFICE O/P EST MOD 30 MIN: CPT | Performed by: FAMILY MEDICINE

## 2017-10-24 PROCEDURE — 36415 COLL VENOUS BLD VENIPUNCTURE: CPT

## 2017-10-24 PROCEDURE — 85652 RBC SED RATE AUTOMATED: CPT

## 2017-10-24 RX ORDER — MONTELUKAST SODIUM 10 MG/1
10 TABLET ORAL DAILY
Qty: 90 TABLET | Refills: 3 | Status: SHIPPED | OUTPATIENT
Start: 2017-10-24 | End: 2018-10-19

## 2017-10-24 RX ORDER — FLUTICASONE PROPIONATE 50 MCG
2 SPRAY, SUSPENSION (ML) NASAL DAILY
Qty: 1 BOTTLE | Refills: 1 | Status: SHIPPED | OUTPATIENT
Start: 2017-10-24 | End: 2018-08-03

## 2017-10-24 RX ORDER — NAPROXEN 500 MG/1
500 TABLET ORAL 2 TIMES DAILY WITH MEALS
Qty: 20 TABLET | Refills: 0 | Status: SHIPPED | OUTPATIENT
Start: 2017-10-24 | End: 2017-11-03

## 2017-10-24 NOTE — PROGRESS NOTES
Aunalia Kelley is a 47year old female. Patient presents with:  Sinus Problem    HPI:   Pt reports while on augmentin and prednisone she felt better but once they are complete her symptoms start coming back - pressure and pain in her face.  Pointing to he 32.56 kg/m²   GENERAL: well developed, well nourished,in no apparent distress  SKIN: no rashes,no suspicious lesions  HEENT: atraumatic, normocephalic,ears and throat are clear  Tenderness at bilateral temples - no pulsating vessel noted.  Tenderness at rig

## 2017-10-26 ENCOUNTER — OFFICE VISIT (OUTPATIENT)
Dept: OTOLARYNGOLOGY | Facility: CLINIC | Age: 52
End: 2017-10-26

## 2017-10-26 VITALS
TEMPERATURE: 98 F | HEIGHT: 66 IN | SYSTOLIC BLOOD PRESSURE: 122 MMHG | BODY MASS INDEX: 30.05 KG/M2 | DIASTOLIC BLOOD PRESSURE: 84 MMHG | WEIGHT: 187 LBS

## 2017-10-26 DIAGNOSIS — H92.01 RIGHT EAR PAIN: Primary | ICD-10-CM

## 2017-10-26 PROCEDURE — 99212 OFFICE O/P EST SF 10 MIN: CPT | Performed by: OTOLARYNGOLOGY

## 2017-10-26 PROCEDURE — 99214 OFFICE O/P EST MOD 30 MIN: CPT | Performed by: OTOLARYNGOLOGY

## 2017-10-26 RX ORDER — MELOXICAM 15 MG/1
15 TABLET ORAL DAILY
Qty: 30 TABLET | Refills: 3 | Status: SHIPPED | OUTPATIENT
Start: 2017-10-26 | End: 2018-02-19

## 2017-10-26 RX ORDER — FLUTICASONE PROPIONATE 50 MCG
SPRAY, SUSPENSION (ML) NASAL
Refills: 1 | OUTPATIENT
Start: 2017-10-26

## 2017-10-27 NOTE — PROGRESS NOTES
Ciro Aleman is a 47year old female.   Patient presents with:  Sinus Problem: having issues for more then month, ear and nose pressure by mandible joint      HISTORY OF PRESENT ILLNESS    She presents with a several week history of worsening left ear p Diagnosis Date   • Biliary calculus    • Lipid screening 5/17/2014    per NG   • Other ill-defined conditions(799.89)     per NextGen:  \"Abnormal pap smear; Management:  colposcopy x3\"   • Vitamin D deficiency        Past Surgical History:  2014: Crista Prescott Normal, Left: Normal. TM - Right: Normal, Left: Normal.   Skin Normal Inspection - Normal.        Lymph Detail Normal Submental. Submandibular. Anterior cervical. Posterior cervical. Supraclavicular.    TMJ   tender to palpation   Nose/Mouth/Throat Normal E

## 2017-11-13 ENCOUNTER — TELEPHONE (OUTPATIENT)
Dept: OTOLARYNGOLOGY | Facility: CLINIC | Age: 52
End: 2017-11-13

## 2017-11-13 NOTE — TELEPHONE ENCOUNTER
LMTCB. Per J, pt was referred to an oral surgeon who specializes in TMJ issues, which Dr. Gabo Oneal probably does not. If pt's insurance permits, pt may see Dr. Jaime Hills, [phone].

## 2017-11-13 NOTE — TELEPHONE ENCOUNTER
Oral surgeon. Pt called to schedule appt but was unclear why. Call to verify why pt is needing to be seen.

## 2017-11-13 NOTE — TELEPHONE ENCOUNTER
Pt's LOV 10/26/17, ear pain. Dr. Mann Search office called because pt called to schedule an appointment but was unclear why. Dr. Nora Rene, please advise if you recommended pt see an oral surgeon and why.

## 2017-11-13 NOTE — TELEPHONE ENCOUNTER
She was referred to an oral surgeon who specializes in TMJ issues. I doubt that he does. She should see Brea De Paz if insurance permits.

## 2017-12-08 NOTE — TELEPHONE ENCOUNTER
Please advise on Triamcinolone cream Rx. To     Pt states that she does use the cream PRN for intermittent rash to her neck area. Pt states that she has a slight rash to her neck with mild itchiness and she is out of the medication.  Pt would like to get th

## 2017-12-08 NOTE — TELEPHONE ENCOUNTER
Patient was left a message to call back.  Transfer to [address]1991  (ask patient if she still uses and needs this medication, rx discontinued in med history)    Refill Protocol Appointment Criteria  · Appointment scheduled in the past 6 months or in the next 3 mon

## 2018-02-02 ENCOUNTER — TELEPHONE (OUTPATIENT)
Dept: GASTROENTEROLOGY | Facility: CLINIC | Age: 53
End: 2018-02-02

## 2018-02-19 ENCOUNTER — OFFICE VISIT (OUTPATIENT)
Dept: FAMILY MEDICINE CLINIC | Facility: CLINIC | Age: 53
End: 2018-02-19

## 2018-02-19 VITALS
DIASTOLIC BLOOD PRESSURE: 86 MMHG | BODY MASS INDEX: 32.15 KG/M2 | SYSTOLIC BLOOD PRESSURE: 128 MMHG | WEIGHT: 193 LBS | HEART RATE: 84 BPM | HEIGHT: 65 IN

## 2018-02-19 DIAGNOSIS — M77.02 MEDIAL EPICONDYLITIS OF LEFT ELBOW: Primary | ICD-10-CM

## 2018-02-19 PROCEDURE — 99212 OFFICE O/P EST SF 10 MIN: CPT | Performed by: FAMILY MEDICINE

## 2018-02-19 PROCEDURE — 99214 OFFICE O/P EST MOD 30 MIN: CPT | Performed by: FAMILY MEDICINE

## 2018-02-19 RX ORDER — MELOXICAM 15 MG/1
15 TABLET ORAL DAILY
Qty: 30 TABLET | Refills: 3 | Status: SHIPPED | OUTPATIENT
Start: 2018-02-19 | End: 2018-08-03

## 2018-02-19 RX ORDER — OMEPRAZOLE 20 MG/1
20 CAPSULE, DELAYED RELEASE ORAL
Qty: 30 CAPSULE | Refills: 5 | Status: SHIPPED | OUTPATIENT
Start: 2018-02-19 | End: 2019-02-19

## 2018-02-19 NOTE — PROGRESS NOTES
Eda Paget is a 47year old female. Patient presents with:  Elbow Pain: Inner left side pain and swelling. Notice about 2 weeks ago. HPI:   2 weeks pain and swelling in left elbow.  Works in [employer] -  - holding part in  Left hand/ar ear pain  RESPIRATORY: denies shortness of breath, denies cough  CARDIOVASCULAR: denies chest pain  GI: denies abdominal pain and denies heartburn  NEURO: denies headaches  Musculoskeletal: pos joint pain, back pain    EXAM:   /86   Pulse 84   Ht 5'

## 2018-02-19 NOTE — PATIENT INSTRUCTIONS
Understanding Medial Epicondylitis    Several muscles attach to the arm at the elbow joint. The tough bands of tissue that attach muscle to bones are called tendons. The bone in the upper arm has knobs on the farthest end called epicondyles.  Tendons thai Call your healthcare provider right away if you have any of these:  · Fever of 100.4°F (38°C) or higher, or as directed  · Redness, swelling, or warmth that gets worse  · Symptoms that don’t get better with prescribed medicines, or get worse  · New symptom

## 2018-03-28 ENCOUNTER — TELEPHONE (OUTPATIENT)
Dept: GASTROENTEROLOGY | Facility: CLINIC | Age: 53
End: 2018-03-28

## 2018-03-28 ENCOUNTER — OFFICE VISIT (OUTPATIENT)
Dept: GASTROENTEROLOGY | Facility: CLINIC | Age: 53
End: 2018-03-28

## 2018-03-28 VITALS
BODY MASS INDEX: 31.63 KG/M2 | HEART RATE: 81 BPM | WEIGHT: 189.81 LBS | HEIGHT: 65 IN | DIASTOLIC BLOOD PRESSURE: 77 MMHG | SYSTOLIC BLOOD PRESSURE: 116 MMHG

## 2018-03-28 DIAGNOSIS — Z12.11 COLON CANCER SCREENING: ICD-10-CM

## 2018-03-28 DIAGNOSIS — Z12.11 ENCOUNTER FOR SCREENING COLONOSCOPY: Primary | ICD-10-CM

## 2018-03-28 DIAGNOSIS — R10.13 DYSPEPSIA: ICD-10-CM

## 2018-03-28 DIAGNOSIS — R12 HEARTBURN: ICD-10-CM

## 2018-03-28 DIAGNOSIS — Z90.49 HISTORY OF CHOLECYSTECTOMY: ICD-10-CM

## 2018-03-28 DIAGNOSIS — Z09 FOLLOW UP: ICD-10-CM

## 2018-03-28 DIAGNOSIS — R12 HEARTBURN: Primary | ICD-10-CM

## 2018-03-28 PROCEDURE — 99214 OFFICE O/P EST MOD 30 MIN: CPT | Performed by: PHYSICIAN ASSISTANT

## 2018-03-28 PROCEDURE — 99212 OFFICE O/P EST SF 10 MIN: CPT | Performed by: PHYSICIAN ASSISTANT

## 2018-03-28 NOTE — PATIENT INSTRUCTIONS
1. Schedule colonoscopy and upper endoscopy with MAC sedation with Dr. Margo Vidal @ [address] [place] [address]. 2.  bowel prep from pharmacy - I have prescribed Suprep. This is a smaller volume preparation.  If this is too expensive, however, please call my office and we wi

## 2018-03-28 NOTE — PROGRESS NOTES
[facility] Follow-up Visit    Ilene PPI only as needed  - Ibuprofen makes the pain worse   - No diarrhea/constipation  - Denies abdominal pain presently  - Feels well and enjoying summer  - No further issues with the RUQ pain that have required hospitalization      Pertinent Surgical Hx:  - 1 tablet in PMWeek 3: 2 tablets in AM    1 tablet in PMWeek 4: Sprague 2 tablets in AM     2 tablets in PM Disp: 70 tablet Rfl: 0   omeprazole 20 MG Oral Capsule Delayed Release Take 1 capsule (20 mg total) by mouth every morning before breakfast. Disp: jaundice  Ext: no LE edema is evident. Neuro: Alert and interactive; patient is having movements of all 4 extremities     Nursing notes and vitals reviewed. Labs/Imaging:     Patient's labs and imaging were reviewed and discussed with patient today. expensive, alternative preparation can be supplemented. Consider generic split dose colyte or trilyte.    - Continue all medications as prescribed  - Anti-reflux precautions   - Hepatic Panel  - Consider RUQ US if clinically warranted    EGD and Colonoscopy

## 2018-03-28 NOTE — TELEPHONE ENCOUNTER
Scheduled for:  Colonoscopy [address]  Provider Name: Kayla Malave  Date:  6/12/18   Location:  [facility]  Sedation: Mac   Time:  0830 / Arrival 0730  Prep: Split dose Suprep , Egd -Npo 3 hours prior to procedure   Meds/Allergies Reconciled?:  Physician macrina

## 2018-04-02 RX ORDER — FLUTICASONE PROPIONATE 50 MCG
SPRAY, SUSPENSION (ML) NASAL
Qty: 1 BOTTLE | Refills: 2 | Status: SHIPPED | OUTPATIENT
Start: 2018-04-02 | End: 2019-05-29 | Stop reason: ALTCHOICE

## 2018-04-02 RX ORDER — NALTREXONE HYDROCHLORIDE AND BUPROPION HYDROCHLORIDE 8; 90 MG/1; MG/1
TABLET, EXTENDED RELEASE ORAL
Qty: 70 TABLET | Refills: 0 | Status: SHIPPED | OUTPATIENT
Start: 2018-04-02 | End: 2018-08-03

## 2018-04-10 ENCOUNTER — APPOINTMENT (OUTPATIENT)
Dept: LAB | Age: 53
End: 2018-04-10
Attending: PHYSICIAN ASSISTANT
Payer: COMMERCIAL

## 2018-04-10 DIAGNOSIS — Z09 FOLLOW UP: ICD-10-CM

## 2018-04-10 PROCEDURE — 36415 COLL VENOUS BLD VENIPUNCTURE: CPT

## 2018-04-10 PROCEDURE — 80076 HEPATIC FUNCTION PANEL: CPT

## 2018-05-08 ENCOUNTER — TELEPHONE (OUTPATIENT)
Dept: GASTROENTEROLOGY | Facility: CLINIC | Age: 53
End: 2018-05-08

## 2018-05-08 NOTE — TELEPHONE ENCOUNTER
Pt states that she is having alot of stomach pain for the past few days, nausea, no vomiting. Pt. States that pain is worse on some days, and states that the Omeprazole med is not helping. Pt. Is sched to get CLN and EGD w/mac on 6/12/18.

## 2018-05-08 NOTE — TELEPHONE ENCOUNTER
Patient states still continues to have abdominal pain,nausea and constipation. Denies vomiting,diarrhea,fever or blood in stool. Omeprazole not helping much. Pain level 2 right now but can get as high as 8 at times. Patient wondering what else she can try.

## 2018-05-09 ENCOUNTER — HOSPITAL ENCOUNTER (EMERGENCY)
Facility: HOSPITAL | Age: 53
Discharge: HOME | End: 2018-05-09
Payer: COMMERCIAL

## 2018-05-09 ENCOUNTER — APPOINTMENT (OUTPATIENT)
Dept: CT IMAGING | Facility: HOSPITAL | Age: 53
End: 2018-05-09
Attending: NURSE PRACTITIONER
Payer: COMMERCIAL

## 2018-05-09 VITALS
HEIGHT: 65 IN | TEMPERATURE: 99 F | BODY MASS INDEX: 29.99 KG/M2 | HEART RATE: 84 BPM | SYSTOLIC BLOOD PRESSURE: 136 MMHG | OXYGEN SATURATION: 98 % | DIASTOLIC BLOOD PRESSURE: 87 MMHG | RESPIRATION RATE: 18 BRPM | WEIGHT: 180 LBS

## 2018-05-09 DIAGNOSIS — K52.9 GASTROENTERITIS: Primary | ICD-10-CM

## 2018-05-09 PROCEDURE — 83690 ASSAY OF LIPASE: CPT | Performed by: NURSE PRACTITIONER

## 2018-05-09 PROCEDURE — 96374 THER/PROPH/DIAG INJ IV PUSH: CPT

## 2018-05-09 PROCEDURE — 80076 HEPATIC FUNCTION PANEL: CPT | Performed by: NURSE PRACTITIONER

## 2018-05-09 PROCEDURE — 99284 EMERGENCY DEPT VISIT MOD MDM: CPT

## 2018-05-09 PROCEDURE — 74177 CT ABD & PELVIS W/CONTRAST: CPT | Performed by: NURSE PRACTITIONER

## 2018-05-09 PROCEDURE — 96375 TX/PRO/DX INJ NEW DRUG ADDON: CPT

## 2018-05-09 PROCEDURE — 80048 BASIC METABOLIC PNL TOTAL CA: CPT | Performed by: NURSE PRACTITIONER

## 2018-05-09 PROCEDURE — 85025 COMPLETE CBC W/AUTO DIFF WBC: CPT | Performed by: NURSE PRACTITIONER

## 2018-05-09 PROCEDURE — 96361 HYDRATE IV INFUSION ADD-ON: CPT

## 2018-05-09 PROCEDURE — 81001 URINALYSIS AUTO W/SCOPE: CPT | Performed by: NURSE PRACTITIONER

## 2018-05-09 RX ORDER — ONDANSETRON 2 MG/ML
4 INJECTION INTRAMUSCULAR; INTRAVENOUS ONCE
Status: COMPLETED | OUTPATIENT
Start: 2018-05-09 | End: 2018-05-09

## 2018-05-09 RX ORDER — ONDANSETRON 4 MG/1
4 TABLET, ORALLY DISINTEGRATING ORAL EVERY 4 HOURS PRN
Qty: 10 TABLET | Refills: 0 | Status: SHIPPED | OUTPATIENT
Start: 2018-05-09 | End: 2018-05-16

## 2018-05-09 RX ORDER — MORPHINE SULFATE 4 MG/ML
2 INJECTION, SOLUTION INTRAMUSCULAR; INTRAVENOUS ONCE
Status: COMPLETED | OUTPATIENT
Start: 2018-05-09 | End: 2018-05-09

## 2018-05-09 NOTE — TELEPHONE ENCOUNTER
Please advise the patient that she needs to go to the ER if symptoms are worsening. Add on GAVISCON over the counter as needed for burning. Anti-reflux precautions should be reviewed with the patient.  Please make sure she is avoiding NSAIDs    In OV w

## 2018-05-09 NOTE — ED PROVIDER NOTES
Patient Seen in: [facility] AND [facility] Emergency Department    History   Patient presents with:  Epigastric Pain    Stated Complaint: epigastric pain    HPI    Patient complains of right sided abdominal pain and epigastric pain that began approximately 1 wee by mouth daily. Fluticasone Propionate 50 MCG/ACT Nasal Suspension,  2 sprays by Each Nare route daily. Cholecalciferol (DIALYVITE VITAMIN D3 MAX) 91313 UNITS Oral Tab,  Take 50,000 Units by mouth every 7 days.    Cyanocobalamin (VITAMIN B 12 OR),  Take agitation        ED Course     Labs Reviewed   BASIC METABOLIC PANEL (8) - Abnormal; Notable for the following:        Result Value    Glucose 102 (*)     CO2 21 (*)     All other components within normal limits   LIPASE - Abnormal; Notable for the followi ventral abdominal wall hernias.       Pt is feeling better with meds   Discussed return and f/u precautions   All questions answered   Dc'd home to f/u with pmd         Disposition and Plan     Clinical Impression:  Gastroenteritis  (primary encounter diagn

## 2018-05-09 NOTE — ED INITIAL ASSESSMENT (HPI)
Intermittent epigastric pain for a few weeks. Prescribed omeprazole and protonix without relief.     +nausea

## 2018-06-12 ENCOUNTER — SURGERY (OUTPATIENT)
Age: 53
End: 2018-06-12

## 2018-06-12 ENCOUNTER — HOSPITAL ENCOUNTER (OUTPATIENT)
Facility: HOSPITAL | Age: 53
Setting detail: HOSPITAL OUTPATIENT SURGERY
Discharge: HOME | End: 2018-06-12
Attending: INTERNAL MEDICINE | Admitting: INTERNAL MEDICINE
Payer: COMMERCIAL

## 2018-06-12 ENCOUNTER — ANESTHESIA (OUTPATIENT)
Dept: ENDOSCOPY | Facility: HOSPITAL | Age: 53
End: 2018-06-12
Payer: COMMERCIAL

## 2018-06-12 ENCOUNTER — TELEPHONE (OUTPATIENT)
Dept: GASTROENTEROLOGY | Facility: CLINIC | Age: 53
End: 2018-06-12

## 2018-06-12 ENCOUNTER — ANESTHESIA EVENT (OUTPATIENT)
Dept: ENDOSCOPY | Facility: HOSPITAL | Age: 53
End: 2018-06-12
Payer: COMMERCIAL

## 2018-06-12 VITALS
BODY MASS INDEX: 30.73 KG/M2 | HEART RATE: 73 BPM | HEIGHT: 64 IN | DIASTOLIC BLOOD PRESSURE: 70 MMHG | WEIGHT: 180 LBS | OXYGEN SATURATION: 98 % | SYSTOLIC BLOOD PRESSURE: 112 MMHG | RESPIRATION RATE: 15 BRPM

## 2018-06-12 DIAGNOSIS — R10.13 DYSPEPSIA: ICD-10-CM

## 2018-06-12 DIAGNOSIS — R12 HEARTBURN: ICD-10-CM

## 2018-06-12 DIAGNOSIS — Z12.11 COLON CANCER SCREENING: ICD-10-CM

## 2018-06-12 PROCEDURE — 45378 DIAGNOSTIC COLONOSCOPY: CPT | Performed by: INTERNAL MEDICINE

## 2018-06-12 PROCEDURE — 0DB68ZX EXCISION OF STOMACH, VIA NATURAL OR ARTIFICIAL OPENING ENDOSCOPIC, DIAGNOSTIC: ICD-10-PCS | Performed by: INTERNAL MEDICINE

## 2018-06-12 PROCEDURE — 43239 EGD BIOPSY SINGLE/MULTIPLE: CPT | Performed by: INTERNAL MEDICINE

## 2018-06-12 PROCEDURE — 0DJD8ZZ INSPECTION OF LOWER INTESTINAL TRACT, VIA NATURAL OR ARTIFICIAL OPENING ENDOSCOPIC: ICD-10-PCS | Performed by: INTERNAL MEDICINE

## 2018-06-12 PROCEDURE — 0DB98ZX EXCISION OF DUODENUM, VIA NATURAL OR ARTIFICIAL OPENING ENDOSCOPIC, DIAGNOSTIC: ICD-10-PCS | Performed by: INTERNAL MEDICINE

## 2018-06-12 RX ORDER — SODIUM CHLORIDE, SODIUM LACTATE, POTASSIUM CHLORIDE, CALCIUM CHLORIDE 600; 310; 30; 20 MG/100ML; MG/100ML; MG/100ML; MG/100ML
INJECTION, SOLUTION INTRAVENOUS CONTINUOUS
Status: DISCONTINUED | OUTPATIENT
Start: 2018-06-12 | End: 2018-06-12

## 2018-06-12 RX ORDER — LIDOCAINE HYDROCHLORIDE 10 MG/ML
INJECTION, SOLUTION EPIDURAL; INFILTRATION; INTRACAUDAL; PERINEURAL AS NEEDED
Status: DISCONTINUED | OUTPATIENT
Start: 2018-06-12 | End: 2018-06-12 | Stop reason: SURG

## 2018-06-12 RX ORDER — NALOXONE HYDROCHLORIDE 0.4 MG/ML
80 INJECTION, SOLUTION INTRAMUSCULAR; INTRAVENOUS; SUBCUTANEOUS AS NEEDED
Status: DISCONTINUED | OUTPATIENT
Start: 2018-06-12 | End: 2018-06-12

## 2018-06-12 RX ORDER — MIDAZOLAM HYDROCHLORIDE 1 MG/ML
INJECTION INTRAMUSCULAR; INTRAVENOUS AS NEEDED
Status: DISCONTINUED | OUTPATIENT
Start: 2018-06-12 | End: 2018-06-12 | Stop reason: SURG

## 2018-06-12 RX ADMIN — LIDOCAINE HYDROCHLORIDE 50 MG: 10 INJECTION, SOLUTION EPIDURAL; INFILTRATION; INTRACAUDAL; PERINEURAL at 08:55:00

## 2018-06-12 RX ADMIN — SODIUM CHLORIDE, SODIUM LACTATE, POTASSIUM CHLORIDE, CALCIUM CHLORIDE: 600; 310; 30; 20 INJECTION, SOLUTION INTRAVENOUS at 09:25:00

## 2018-06-12 RX ADMIN — SODIUM CHLORIDE, SODIUM LACTATE, POTASSIUM CHLORIDE, CALCIUM CHLORIDE: 600; 310; 30; 20 INJECTION, SOLUTION INTRAVENOUS at 08:52:00

## 2018-06-12 RX ADMIN — MIDAZOLAM HYDROCHLORIDE 2 MG: 1 INJECTION INTRAMUSCULAR; INTRAVENOUS at 08:55:00

## 2018-06-12 NOTE — H&P
History & Physical Examination    Patient Name: Eda Paget  MRN: [id number]  CSN: [id number]  YOB: 1965    Diagnosis: Screening colonoscopy examination    Present Illness:     Screening colonoscopy examination; history of acute colonic d Difficulty opening mouth wide during general anesthesia   • Esophageal reflux    • Lipid screening 5/17/2014    per NG   • Other ill-defined conditions(799.89)     per NextGen:  \"Abnormal pap smear; Management:  colposcopy x3\"   • Vitamin D deficiency

## 2018-06-12 NOTE — ANESTHESIA PREPROCEDURE EVALUATION
Anesthesia PreOp Note    HPI:     Ute Carrington is a 47year old female who presents for preoperative consultation requested by: Mario Virk MD    Date of Surgery: 6/12/2018    Procedure(s):  COLONOSCOPY  ESOPHAGOGASTRODUODENOSCOPY (EGD) Taking   Montelukast Sodium (SINGULAIR) 10 MG Oral Tab Take 1 tablet (10 mg total) by mouth daily. Disp: 90 tablet Rfl: 3 Not Taking   Fluticasone Propionate 50 MCG/ACT Nasal Suspension 2 sprays by Each Nare route daily.  Disp: 1 Bottle Rfl: 1 Taking   Chol 11 05/09/2018   CREATSERUM 0.81 05/09/2018    (H) 05/09/2018   CA 9.0 05/09/2018          Vital Signs: There is no height or weight on file to calculate BMI.   vitals were not taken for this visit. There were no vitals filed for this visit.      An

## 2018-06-12 NOTE — TELEPHONE ENCOUNTER
GI RNs–  Please call MsAndrea Hansel Ybarra to advise that biopsies from today's EGD examination showed the H. pylori stomach infection. That is causing the gastritis and some of her symptoms.     Please verify allergies (allergic to clindamycin?) and verify no allerg

## 2018-06-12 NOTE — ANESTHESIA POSTPROCEDURE EVALUATION
Patient: Favian Tucker    Procedure Summary     Date:  06/12/18 Room / Location:  [address] [place] [address] ENDOSCOPY 05 / [facility] ENDOSCOPY    Anesthesia Start:  9664 Anesthesia Stop:  3099    Procedures:       COLONOSCOPY (N/A )      ESOPHAGOGASTRODUODENOSCOPY (EGD) (N/A ) Hali

## 2018-06-12 NOTE — OPERATIVE REPORT
EGD AND COLONOSCOPY PROCEDURE REPORTS:    DATE OF PROCEDURE:  6/12/2018    PCP: Cleo Galarza MD     PREOPERATIVE DIAGNOSIS: GERD symptoms, nausea, dyspepsia, abdominal pain, screening colonoscopy examination     POSTOPERATIVE DIAGNOSIS:  See impression visualization through the entire length of the colon to the cecum and terminal ileum. Retroflexed exam performed up the ascending colon to the hepatic flexure. Cecum was confirmed by landmarks, including appendiceal orifice, cecal strap, ileocecal valve.

## 2018-06-12 NOTE — TELEPHONE ENCOUNTER
Dr Chichi Silva,    I spoke to [facility],    She verified she gets a rash when she takes Clindamycin    She states she is able to take penicillin     I also verified her pharmacy

## 2018-06-14 RX ORDER — AMOXICILLIN 500 MG/1
1000 TABLET, FILM COATED ORAL 2 TIMES DAILY
Qty: 56 TABLET | Refills: 0 | Status: SHIPPED | OUTPATIENT
Start: 2018-06-14 | End: 2018-06-28

## 2018-06-14 RX ORDER — PANTOPRAZOLE SODIUM 40 MG/1
40 TABLET, DELAYED RELEASE ORAL 2 TIMES DAILY
Qty: 28 TABLET | Refills: 0 | Status: SHIPPED | OUTPATIENT
Start: 2018-06-14 | End: 2018-09-05

## 2018-06-14 RX ORDER — CLARITHROMYCIN 500 MG/1
500 TABLET, COATED ORAL 2 TIMES DAILY
Qty: 28 TABLET | Refills: 0 | Status: SHIPPED | OUTPATIENT
Start: 2018-06-14 | End: 2018-06-28

## 2018-06-14 NOTE — TELEPHONE ENCOUNTER
Please see message below. Message sent to Dr. Nicolasa Handy. Patient waiting for Rx to be sent to pharmacy please. Thank you.

## 2018-06-14 NOTE — TELEPHONE ENCOUNTER
Pt contacted reviewed below and instructions. Reminded to take all 4 meds together twice daily for 2 weeks. She is to hold her Omeprazole while on treatment, as she will be taking Pantoprazole. She can resume Omeprazole after the 2 weeks.  States understand

## 2018-06-25 NOTE — TELEPHONE ENCOUNTER
Entered into EPIC:Recall colon in 10 years per Dr. Asa Ly. Last Colon done 6/12/18, next due 6/12/28. Snapshot updated.

## 2018-07-24 ENCOUNTER — OFFICE VISIT (OUTPATIENT)
Dept: OBGYN CLINIC | Facility: CLINIC | Age: 53
End: 2018-07-24
Payer: COMMERCIAL

## 2018-07-24 VITALS — DIASTOLIC BLOOD PRESSURE: 74 MMHG | BODY MASS INDEX: 32 KG/M2 | SYSTOLIC BLOOD PRESSURE: 126 MMHG | WEIGHT: 186 LBS

## 2018-07-24 DIAGNOSIS — T19.2XXA VAGINAL FOREIGN OBJECT, INITIAL ENCOUNTER: ICD-10-CM

## 2018-07-24 DIAGNOSIS — B37.3 CANDIDIASIS OF VULVA AND VAGINA: Primary | ICD-10-CM

## 2018-07-24 PROCEDURE — 99213 OFFICE O/P EST LOW 20 MIN: CPT | Performed by: ADVANCED PRACTICE MIDWIFE

## 2018-07-24 NOTE — PROGRESS NOTES
HPI:    Patient ID: Princess Vegas is a 47year old female. Says her  feels he is hitting something when he inserts his penis in her vagina and then he gets little \"cuts or rash\" that hurts very bad. This has been going on for 3-4 months.   H Allergies:  Clindamycin             RASH   PHYSICAL EXAM:   Physical Exam   Genitourinary:   Genitourinary Comments: Vulva -  Normal female w/o lesion, edema or erythema  Vagina - small amount white d/c w/o odor on inspection no abnormal growth or obje

## 2018-07-26 ENCOUNTER — OFFICE VISIT (OUTPATIENT)
Dept: OBGYN CLINIC | Facility: CLINIC | Age: 53
End: 2018-07-26
Payer: COMMERCIAL

## 2018-07-26 VITALS — DIASTOLIC BLOOD PRESSURE: 76 MMHG | WEIGHT: 186 LBS | SYSTOLIC BLOOD PRESSURE: 118 MMHG | BODY MASS INDEX: 32 KG/M2

## 2018-07-26 DIAGNOSIS — T85.9XXA COMPLICATION OF IMPLANTED VAGINAL MESH, INITIAL ENCOUNTER: Primary | ICD-10-CM

## 2018-07-26 PROCEDURE — 99214 OFFICE O/P EST MOD 30 MIN: CPT | Performed by: OBSTETRICS & GYNECOLOGY

## 2018-07-26 NOTE — PROGRESS NOTES
HPI:    Patient ID: Ute Carrington is a 47year old female. HPI  Patient referred by CNP for possible mesh erosion at vaginal cuff. Sharp object in right cuff angle.   This is a 40 minute visit and greater than 50% of the time was spent counseling the place, and time. She appears well-developed and well-nourished. Abdominal: Soft. Genitourinary: Vagina normal.   Neurological: She is alert and oriented to person, place, and time. Skin: Skin is warm and dry.    Psychiatric: She has a normal mood and

## 2018-08-03 ENCOUNTER — OFFICE VISIT (OUTPATIENT)
Dept: FAMILY MEDICINE CLINIC | Facility: CLINIC | Age: 53
End: 2018-08-03
Payer: COMMERCIAL

## 2018-08-03 VITALS
SYSTOLIC BLOOD PRESSURE: 125 MMHG | WEIGHT: 187.19 LBS | DIASTOLIC BLOOD PRESSURE: 75 MMHG | BODY MASS INDEX: 31.96 KG/M2 | HEART RATE: 89 BPM | TEMPERATURE: 98 F | HEIGHT: 64 IN

## 2018-08-03 DIAGNOSIS — R06.83 SNORING: Primary | ICD-10-CM

## 2018-08-03 PROCEDURE — 99213 OFFICE O/P EST LOW 20 MIN: CPT | Performed by: FAMILY MEDICINE

## 2018-08-03 PROCEDURE — 99212 OFFICE O/P EST SF 10 MIN: CPT | Performed by: FAMILY MEDICINE

## 2018-08-03 RX ORDER — MELOXICAM 15 MG/1
15 TABLET ORAL DAILY
Qty: 30 TABLET | Refills: 3 | Status: SHIPPED | OUTPATIENT
Start: 2018-08-03 | End: 2019-02-11

## 2018-08-03 NOTE — PROGRESS NOTES
Mendel Urbina is a 47year old female. Patient presents with:  Test Results: post endoscopy  Snoring  Medication Request: refill meloxicam    HPI:   Reports snoring bad at night.  She had egd and colonoscopy and told she did not sound good while sleeping Alcohol use: Yes           0.0 oz/week     Comment: occ.        REVIEW OF SYSTEMS:   GENERAL HEALTH: feels well otherwise  SKIN: denies any unusual skin lesions or rashes  HEENT: denies eye complaints,denies sore throat, denies ear pain  RESPIRATORY:

## 2018-08-29 ENCOUNTER — OFFICE VISIT (OUTPATIENT)
Dept: SLEEP CENTER | Age: 53
End: 2018-08-29
Attending: FAMILY MEDICINE
Payer: COMMERCIAL

## 2018-08-29 DIAGNOSIS — G47.33 OSA (OBSTRUCTIVE SLEEP APNEA): Primary | ICD-10-CM

## 2018-08-29 DIAGNOSIS — R06.83 SNORING: ICD-10-CM

## 2018-08-29 PROCEDURE — 95806 SLEEP STUDY UNATT&RESP EFFT: CPT

## 2018-09-02 NOTE — PROCEDURES
[facility]  Accredited by the [employer] (AASM)    PATIENT'S NAME: Reillygisele Ocampo   ATTENDING PHYSICIAN: Geraldo Math. Irineo Najjar, MD   REFERRING PHYSICIAN: Geraldo Math. Irineo Najjar, MD   PATIENT ACCOUNT #: [de-identified] LOCATION: Sleep C 1. CPAP titration. 2.   Weight loss. 3.   Avoid alcohol. 4.   Avoid sedating drug. 5.   Patient should not drive if at all sleepy. Please do not hesitate to contact me if there is any question whatsoever regarding interpretation of this study.

## 2018-09-05 NOTE — TELEPHONE ENCOUNTER
Pending Prescriptions Disp Refills    PANTOPRAZOLE SODIUM 40 MG Oral Tab EC [Pharmacy Med Name: PANTOPRAZOLE 40MG TABLETS] 28 tablet 0     Sig: TAKE 1 TABLET BY MOUTH TWICE DAILY         See refill request  Patient last seen 3-28-18.    Medication last refilled 6-14-18

## 2018-09-06 RX ORDER — PANTOPRAZOLE SODIUM 40 MG/1
40 TABLET, DELAYED RELEASE ORAL
Qty: 30 TABLET | Refills: 3 | Status: SHIPPED | OUTPATIENT
Start: 2018-09-06 | End: 2019-03-07

## 2018-09-10 NOTE — PROGRESS NOTES
You do have severe sleep apnea and need to go for another test in which you go to the sleep lab and they place a mask on you - CPAP machine for trial and for adjustment of settings. - Dr. Christos Enrique

## 2018-09-15 DIAGNOSIS — S16.1XXA NECK STRAIN: ICD-10-CM

## 2018-09-15 RX ORDER — CYCLOBENZAPRINE HCL 10 MG
10 TABLET ORAL NIGHTLY
Qty: 40 TABLET | Refills: 0 | Status: SHIPPED | OUTPATIENT
Start: 2018-09-15 | End: 2019-03-28

## 2018-09-15 NOTE — TELEPHONE ENCOUNTER
Patient is asking for a refill for her cyclobenzaprine. Per the patient she has chronic stress in her neck and shoulder. The patient stated she takes only 1 to 2 tablets and it goes away. It is a chronic conditions.   Patient stated she does not use oft

## 2018-09-29 ENCOUNTER — OFFICE VISIT (OUTPATIENT)
Dept: SLEEP CENTER | Age: 53
End: 2018-09-29
Attending: FAMILY MEDICINE
Payer: COMMERCIAL

## 2018-09-29 DIAGNOSIS — Z76.89 SLEEP CONCERN: Primary | ICD-10-CM

## 2018-09-29 DIAGNOSIS — G47.30 SEVERE SLEEP APNEA: ICD-10-CM

## 2018-09-29 PROCEDURE — 95811 POLYSOM 6/>YRS CPAP 4/> PARM: CPT

## 2018-10-03 NOTE — PROCEDURES
[address] [facility]  Accredited by the [employer] (AASM)    PATIENT'S NAME: Iza Courser   ATTENDING PHYSICIAN: Sulma Haskins MD   REFERRING PHYSICIAN: Sulma Haskins MD   PATIENT ACCOUNT #: [de-identified] LOCATION: Sleep C 11.3 events per hour. There were 10 periodic limb movements for a periodic limb movement index of 1.6 events per hour of which 0.3 per hour were associated with arousal.  Lowest desaturation was to 92%.   The average heart rate is 74 beats per minute, and

## 2018-10-04 NOTE — PROGRESS NOTES
Pt has severe sleep apnea. I am placing an order for CPAP machine. Will need to get approved by insurance and she will receive a call about this in next few weeks.

## 2018-10-05 ENCOUNTER — TELEPHONE (OUTPATIENT)
Dept: OTHER | Age: 53
End: 2018-10-05

## 2018-10-05 NOTE — TELEPHONE ENCOUNTER
Reviewed sleep apnea results with patient along with Dr Polly Vogel recommendations. Pt verbalized understanding and agreed with plan. Reason for call changed form results to cpap category.       Result Notes for GENERAL SLEEP STUDY TRANSCRIPTION     Notes rec

## 2018-10-11 ENCOUNTER — HOSPITAL ENCOUNTER (OUTPATIENT)
Dept: MAMMOGRAPHY | Age: 53
Discharge: HOME | End: 2018-10-11
Attending: FAMILY MEDICINE
Payer: COMMERCIAL

## 2018-10-11 DIAGNOSIS — Z12.31 SCREENING MAMMOGRAM, ENCOUNTER FOR: ICD-10-CM

## 2018-10-11 PROCEDURE — 77067 SCR MAMMO BI INCL CAD: CPT | Performed by: FAMILY MEDICINE

## 2018-10-11 PROCEDURE — 77063 BREAST TOMOSYNTHESIS BI: CPT | Performed by: FAMILY MEDICINE

## 2018-10-12 NOTE — TELEPHONE ENCOUNTER
Contacted [facility] spoke with rep Lueng to check the status of the order. Order with NPI# was received and they are working on the order.

## 2018-10-16 NOTE — TELEPHONE ENCOUNTER
Spoke with rep Levi at [facility] to check the status of patient's order. Rep states an order form needs to be filled out; Jose Maker is handling the order and he states there may be one already on file. Jose Hollyr will call back with an update.

## 2018-10-17 NOTE — TELEPHONE ENCOUNTER
One step order form received from [facility]. Dr. Luis Armando Harris must complete form sign, date and fax back to [facility] at [phone]. Form is required so that [facility] can proceed with CPAP order.

## 2018-10-23 ENCOUNTER — OFFICE VISIT (OUTPATIENT)
Dept: FAMILY MEDICINE CLINIC | Facility: CLINIC | Age: 53
End: 2018-10-23
Payer: COMMERCIAL

## 2018-10-23 VITALS
SYSTOLIC BLOOD PRESSURE: 116 MMHG | BODY MASS INDEX: 33 KG/M2 | HEART RATE: 91 BPM | WEIGHT: 193.38 LBS | DIASTOLIC BLOOD PRESSURE: 74 MMHG

## 2018-10-23 DIAGNOSIS — H92.02 LEFT EAR PAIN: Primary | ICD-10-CM

## 2018-10-23 PROCEDURE — 99213 OFFICE O/P EST LOW 20 MIN: CPT | Performed by: FAMILY MEDICINE

## 2018-10-23 PROCEDURE — 99212 OFFICE O/P EST SF 10 MIN: CPT | Performed by: FAMILY MEDICINE

## 2018-10-23 RX ORDER — AMOXICILLIN 875 MG/1
875 TABLET, COATED ORAL 2 TIMES DAILY
Qty: 20 TABLET | Refills: 0 | Status: SHIPPED | OUTPATIENT
Start: 2018-10-23 | End: 2018-11-02

## 2018-10-23 RX ORDER — FLUCONAZOLE 150 MG/1
150 TABLET ORAL ONCE
Qty: 2 TABLET | Refills: 0 | Status: SHIPPED | OUTPATIENT
Start: 2018-10-23 | End: 2018-10-23

## 2018-10-23 NOTE — PROGRESS NOTES
Mendel Urbina is a 47year old female. Patient presents with:  Ear Problem  Dizziness    HPI:   2 weeks left ear pain and headaches. No fevers.      Current Outpatient Medications on File Prior to Visit:  Cyclobenzaprine HCl 10 MG Oral Tab Take 1 tablet denies eye complaints,denies sore throat,pos ear pain  RESPIRATORY: denies shortness of breath, denies cough  CARDIOVASCULAR: denies chest pain  GI: denies abdominal pain and denies heartburn  NEURO: denies headaches  Musculoskeletal: no joint pain, back p

## 2018-11-26 ENCOUNTER — HOSPITAL (OUTPATIENT)
Dept: OTHER | Age: 53
End: 2018-11-26
Attending: EMERGENCY MEDICINE

## 2018-11-26 ENCOUNTER — IMAGING SERVICES (OUTPATIENT)
Dept: OTHER | Age: 53
End: 2018-11-26

## 2018-11-30 NOTE — PROGRESS NOTES
Tests are all normal. Inflammatory labs were all normal/negative so do not need further testing for the temple pain. - Dr. Irineo Najjar Spouse

## 2018-12-04 ENCOUNTER — OFFICE VISIT (OUTPATIENT)
Dept: FAMILY MEDICINE CLINIC | Facility: CLINIC | Age: 53
End: 2018-12-04
Payer: COMMERCIAL

## 2018-12-04 VITALS
BODY MASS INDEX: 33 KG/M2 | WEIGHT: 191 LBS | HEART RATE: 78 BPM | TEMPERATURE: 97 F | DIASTOLIC BLOOD PRESSURE: 69 MMHG | SYSTOLIC BLOOD PRESSURE: 113 MMHG

## 2018-12-04 DIAGNOSIS — R53.83 OTHER FATIGUE: ICD-10-CM

## 2018-12-04 DIAGNOSIS — Z13.220 SCREENING, LIPID: Primary | ICD-10-CM

## 2018-12-04 DIAGNOSIS — R10.9 ABDOMINAL PAIN, UNSPECIFIED ABDOMINAL LOCATION: ICD-10-CM

## 2018-12-04 DIAGNOSIS — F50.81 BINGE EATING DISORDER: ICD-10-CM

## 2018-12-04 PROCEDURE — 99212 OFFICE O/P EST SF 10 MIN: CPT | Performed by: FAMILY MEDICINE

## 2018-12-04 PROCEDURE — 99214 OFFICE O/P EST MOD 30 MIN: CPT | Performed by: FAMILY MEDICINE

## 2018-12-04 RX ORDER — CHOLESTYRAMINE LIGHT 4 G/5.7G
4 POWDER, FOR SUSPENSION ORAL 2 TIMES DAILY
Qty: 60 EACH | Refills: 11 | Status: SHIPPED | OUTPATIENT
Start: 2018-12-04 | End: 2019-04-03
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
## 2018-12-06 ENCOUNTER — LAB ENCOUNTER (OUTPATIENT)
Dept: LAB | Age: 53
End: 2018-12-06
Attending: FAMILY MEDICINE
Payer: COMMERCIAL

## 2018-12-06 DIAGNOSIS — R10.9 ABDOMINAL PAIN, UNSPECIFIED ABDOMINAL LOCATION: ICD-10-CM

## 2018-12-06 DIAGNOSIS — R53.83 OTHER FATIGUE: ICD-10-CM

## 2018-12-06 DIAGNOSIS — Z13.220 SCREENING, LIPID: ICD-10-CM

## 2018-12-06 PROCEDURE — 80053 COMPREHEN METABOLIC PANEL: CPT

## 2018-12-06 PROCEDURE — 83735 ASSAY OF MAGNESIUM: CPT

## 2018-12-06 PROCEDURE — 84443 ASSAY THYROID STIM HORMONE: CPT

## 2018-12-06 PROCEDURE — 80061 LIPID PANEL: CPT

## 2018-12-06 PROCEDURE — 36415 COLL VENOUS BLD VENIPUNCTURE: CPT

## 2018-12-06 PROCEDURE — 85025 COMPLETE CBC W/AUTO DIFF WBC: CPT

## 2018-12-07 NOTE — PROGRESS NOTES
Vidhi Boggs - The labs look good.  Your cholesterol is a bit better compared to last year.  - Dr. Ham Tellez

## 2019-01-15 ENCOUNTER — TELEPHONE (OUTPATIENT)
Dept: OBGYN CLINIC | Facility: CLINIC | Age: 54
End: 2019-01-15

## 2019-01-16 NOTE — TELEPHONE ENCOUNTER
Regarding: RE: RE: Visit Follow-up Question  Contact: [phone]  ----- Message from [employer] sent at 1/15/2019 11:43 AM CST -----     Could you please Recommend a Dr. Jimbo Mcneil not Sure which one. .    Thank you     ----- Message -----  From: Frankie Barajas

## 2019-02-11 ENCOUNTER — OFFICE VISIT (OUTPATIENT)
Dept: OBGYN CLINIC | Facility: CLINIC | Age: 54
End: 2019-02-11
Payer: COMMERCIAL

## 2019-02-11 VITALS
SYSTOLIC BLOOD PRESSURE: 120 MMHG | HEART RATE: 82 BPM | WEIGHT: 195 LBS | DIASTOLIC BLOOD PRESSURE: 62 MMHG | BODY MASS INDEX: 33 KG/M2

## 2019-02-11 DIAGNOSIS — Z11.3 ROUTINE SCREENING FOR STI (SEXUALLY TRANSMITTED INFECTION): ICD-10-CM

## 2019-02-11 DIAGNOSIS — N89.8 VAGINAL DISCHARGE: Primary | ICD-10-CM

## 2019-02-11 PROCEDURE — 99213 OFFICE O/P EST LOW 20 MIN: CPT | Performed by: ADVANCED PRACTICE MIDWIFE

## 2019-02-11 NOTE — PROGRESS NOTES
HPI:    Patient ID: Get Yadav is a 47year old female. HPI    Review of Systems   Constitutional: Negative. Respiratory: Negative. Cardiovascular: Negative. Gastrointestinal: Negative. Skin: Negative. Neurological: Negative.

## 2019-02-12 ENCOUNTER — TELEPHONE (OUTPATIENT)
Dept: OBGYN CLINIC | Facility: CLINIC | Age: 54
End: 2019-02-12

## 2019-02-12 LAB
C TRACH DNA SPEC QL NAA+PROBE: NEGATIVE
N GONORRHOEA DNA SPEC QL NAA+PROBE: NEGATIVE

## 2019-02-12 RX ORDER — METRONIDAZOLE 500 MG/1
500 TABLET ORAL 2 TIMES DAILY
Qty: 14 TABLET | Refills: 0 | Status: SHIPPED | OUTPATIENT
Start: 2019-02-12 | End: 2019-02-19

## 2019-02-12 NOTE — TELEPHONE ENCOUNTER
RN returned call to patient. Pt advised of results and recommendations for treatment. RN reviews need to avoid ETOH x 10 days and reviews vaginitis precautions with patient.  Pt verbalizes understanding and agrees with  plan

## 2019-02-12 NOTE — TELEPHONE ENCOUNTER
----- Message from LADARIUS Dominguez sent at 2/12/2019 10:25 AM CST -----  Has a BV infection needs Flagyl 500 mg BID x 7 days no alcohol for 10 days

## 2019-02-12 NOTE — TELEPHONE ENCOUNTER
Pt had a question regarding the BV diagnosis she received. Wanted to verify that this was not sexually transmitted and if she could take a probiotic while on antibiotic. Advised she could take probiotic and explained that BV is not sexually transmitted.  It

## 2019-02-13 LAB
GENITAL VAGINOSIS SCREEN: POSITIVE
TRICHOMONAS SCREEN: NEGATIVE

## 2019-02-26 ENCOUNTER — OFFICE VISIT (OUTPATIENT)
Dept: UROLOGY | Facility: HOSPITAL | Age: 54
End: 2019-02-26
Attending: OBSTETRICS & GYNECOLOGY
Payer: COMMERCIAL

## 2019-02-26 VITALS
DIASTOLIC BLOOD PRESSURE: 70 MMHG | SYSTOLIC BLOOD PRESSURE: 118 MMHG | WEIGHT: 188 LBS | HEIGHT: 64 IN | BODY MASS INDEX: 32.1 KG/M2

## 2019-02-26 DIAGNOSIS — M79.10 MYALGIA: ICD-10-CM

## 2019-02-26 DIAGNOSIS — N89.8 VAGINAL GRANULATION TISSUE: ICD-10-CM

## 2019-02-26 DIAGNOSIS — N81.84 PELVIC MUSCLE WASTING: ICD-10-CM

## 2019-02-26 DIAGNOSIS — N39.41 URGE INCONTINENCE: ICD-10-CM

## 2019-02-26 DIAGNOSIS — N39.3 FEMALE STRESS INCONTINENCE: Primary | ICD-10-CM

## 2019-02-26 DIAGNOSIS — R10.2 VAGINAL PAIN: ICD-10-CM

## 2019-02-26 LAB
BLOOD URINE: NEGATIVE
CONTROL RUN WITHIN 24 HOURS?: YES
LEUKOCYTE ESTERASE URINE: NEGATIVE
NITRITE URINE: NEGATIVE

## 2019-02-26 PROCEDURE — 99201 HC OUTPT EVAL AND MGNT NEW PT LEVEL 1: CPT

## 2019-02-26 PROCEDURE — 87086 URINE CULTURE/COLONY COUNT: CPT | Performed by: OBSTETRICS & GYNECOLOGY

## 2019-02-26 NOTE — PROGRESS NOTES
Glenna Joseph,   2/26/2019     Referred by Dr. Papo Dunn    Patient presents with:   Incontinence: referred by Dr. Papo Dunn    hyst yrs ago, [place] (? Uterine ca?)  Gyne removed small portion of exposed mesh, review of records shows 2007 pubovag sling (Ment quittin.5      Smokeless tobacco: Former User    Alcohol use: Yes      Alcohol/week: 0.0 oz      Comment: occ.     Drug use: No       Allergies:    Clindamycin             RASH    Medications:    Outpatient Medications Prior to Visit:  Lisdexamfetamine previous pubovag sling near bladder neck, unable to visualize mesh erosion, pt uncomfortable and exam limited by discomfort.  Suspect erosion surrounded by granulation tissue  Cervix & Uterus: absent  Adnexa:no masses, nontender  Perineum: nontender  Anus: diet/drill    Treatment Plan, Surgical:   Mid-urethral slings (Trans Vaginal Taping, TOT, single-incision)  EUA, excision of vaginal scar    Pt verbalizes understanding of all above discussed information    Return for UDS, follow up.     Siva Sidhu DO

## 2019-02-26 NOTE — PATIENT INSTRUCTIONS
[facility] [place] [facility]  [address] [place] [address],[address]  [facility]  Office: [phone]  Fax: [phone]  [email or web address]    What is Urodynamic Testing?   Your physician or specialist has ordered an outpatient procedure for measurements will be recorded and you will be asked to cough and push or bear down to check for any leakage. You will also be asked about the way your bladder feels as it is filling.   Small sticky patches are often placed on either side of your bottom to

## 2019-03-07 RX ORDER — PANTOPRAZOLE SODIUM 40 MG/1
TABLET, DELAYED RELEASE ORAL
Qty: 90 TABLET | Refills: 3 | Status: SHIPPED | OUTPATIENT
Start: 2019-03-07 | End: 2020-04-27

## 2019-03-11 ENCOUNTER — OFFICE VISIT (OUTPATIENT)
Dept: UROLOGY | Facility: HOSPITAL | Age: 54
End: 2019-03-11
Attending: OBSTETRICS & GYNECOLOGY
Payer: COMMERCIAL

## 2019-03-11 VITALS
BODY MASS INDEX: 32.1 KG/M2 | SYSTOLIC BLOOD PRESSURE: 120 MMHG | HEIGHT: 64 IN | WEIGHT: 188 LBS | DIASTOLIC BLOOD PRESSURE: 84 MMHG

## 2019-03-11 DIAGNOSIS — N39.41 URGE INCONTINENCE: ICD-10-CM

## 2019-03-11 DIAGNOSIS — N39.3 FEMALE STRESS INCONTINENCE: Primary | ICD-10-CM

## 2019-03-11 LAB
CONTROL RUN WITHIN 24 HOURS?: YES
LEUKOCYTE ESTERASE URINE: NEGATIVE
NITRITE URINE: NEGATIVE

## 2019-03-11 PROCEDURE — 51729 CYSTOMETROGRAM W/VP&UP: CPT

## 2019-03-11 PROCEDURE — 51797 INTRAABDOMINAL PRESSURE TEST: CPT

## 2019-03-11 PROCEDURE — 51741 ELECTRO-UROFLOWMETRY FIRST: CPT

## 2019-03-11 PROCEDURE — 51784 ANAL/URINARY MUSCLE STUDY: CPT

## 2019-03-11 NOTE — PATIENT INSTRUCTIONS
[facility]  [address] [place] [address],[address]  [place], [address] [facility]  Office: [phone]      Urodynamic Testing Discharge Instructions: There are NO dietary or activity restrictions. You may resume your normal schedule.       You may hav

## 2019-03-11 NOTE — PROCEDURES
Patient here for urodynamic testing. Procedure explained and confirmed by patient. See evaluation form for results. Both verbal and written discharge instructions were given.   Patient tolerated procedure well and will follow up with Dr. Charity Manley void:  342 mL3  Maximum cystometric capacity:   363 mL  Detrusor Activity:  [x]  Unstable   []  Stable  Urge leakage?     []  Yes [x]  No  Volume at 1st unhibited detrusor cont:   361 mL  Detrusor instability provoked by:    []  Spontaneous []  Coughing  [x

## 2019-03-14 ENCOUNTER — TELEPHONE (OUTPATIENT)
Dept: UROLOGY | Facility: HOSPITAL | Age: 54
End: 2019-03-14

## 2019-03-14 RX ORDER — SULFAMETHOXAZOLE AND TRIMETHOPRIM 800; 160 MG/1; MG/1
1 TABLET ORAL 2 TIMES DAILY
Qty: 14 TABLET | Refills: 0 | Status: SHIPPED | OUTPATIENT
Start: 2019-03-14 | End: 2019-03-21

## 2019-03-14 RX ORDER — PHENAZOPYRIDINE HYDROCHLORIDE 200 MG/1
200 TABLET, FILM COATED ORAL 3 TIMES DAILY PRN
Qty: 21 TABLET | Refills: 0 | Status: SHIPPED | OUTPATIENT
Start: 2019-03-14 | End: 2019-05-29 | Stop reason: ALTCHOICE

## 2019-03-14 NOTE — TELEPHONE ENCOUNTER
TC from pt via answering svc with c/o s/sx of UTI  Urinary urgency/freqency with dysuria  Pinkish colored urine  No fevers/chills  Reviewed allergies  Empiric Bactrim DS po bid x7d sent to pharm  OTC AZO if pyridium too expensive  Plan for ucx if sx persis

## 2019-03-26 ENCOUNTER — OFFICE VISIT (OUTPATIENT)
Dept: UROLOGY | Facility: HOSPITAL | Age: 54
End: 2019-03-26
Attending: OBSTETRICS & GYNECOLOGY
Payer: COMMERCIAL

## 2019-03-26 ENCOUNTER — TELEPHONE (OUTPATIENT)
Dept: FAMILY MEDICINE CLINIC | Facility: CLINIC | Age: 54
End: 2019-03-26

## 2019-03-26 VITALS
SYSTOLIC BLOOD PRESSURE: 110 MMHG | BODY MASS INDEX: 32.1 KG/M2 | HEIGHT: 64 IN | WEIGHT: 188 LBS | DIASTOLIC BLOOD PRESSURE: 62 MMHG

## 2019-03-26 DIAGNOSIS — N89.8 VAGINAL GRANULATION TISSUE: ICD-10-CM

## 2019-03-26 DIAGNOSIS — N81.84 PELVIC MUSCLE WASTING: ICD-10-CM

## 2019-03-26 DIAGNOSIS — N39.3 FEMALE STRESS INCONTINENCE: Primary | ICD-10-CM

## 2019-03-26 DIAGNOSIS — N39.41 URGE INCONTINENCE: ICD-10-CM

## 2019-03-26 LAB
CONTROL RUN WITHIN 24 HOURS?: YES
LEUKOCYTE ESTERASE URINE: NEGATIVE
NITRITE URINE: NEGATIVE

## 2019-03-26 PROCEDURE — 87086 URINE CULTURE/COLONY COUNT: CPT | Performed by: OBSTETRICS & GYNECOLOGY

## 2019-03-26 PROCEDURE — 99211 OFF/OP EST MAY X REQ PHY/QHP: CPT

## 2019-03-26 NOTE — PROGRESS NOTES
Pt presents w/ initial c/o pain, UI  Urodynamic testing undergone without complication.   She c/o s/sx of UTI  Results reviewed with patient  372[facility]150cc & 424[facility]35[facility]  [facility] 363cc  + VERONIQUE @ 200cc    Discussed with patient mgmt options for VERONIQUE, DO  Bi

## 2019-03-26 NOTE — TELEPHONE ENCOUNTER
Pt states that she is schedule to have surgery on 4-18-19 for pelvic. Pt was told to make an appt for pre op clearance. Pt would like to see Dr. Brady Arita next week for pre op clearance. There are no available appointments.

## 2019-03-28 ENCOUNTER — PATIENT MESSAGE (OUTPATIENT)
Dept: FAMILY MEDICINE CLINIC | Facility: CLINIC | Age: 54
End: 2019-03-28

## 2019-03-28 DIAGNOSIS — S16.1XXA NECK STRAIN: ICD-10-CM

## 2019-03-28 RX ORDER — CYCLOBENZAPRINE HCL 10 MG
10 TABLET ORAL NIGHTLY
Qty: 40 TABLET | Refills: 0 | Status: SHIPPED | OUTPATIENT
Start: 2019-03-28 | End: 2020-08-27

## 2019-03-28 NOTE — TELEPHONE ENCOUNTER
Review pended refill request as it does not fall under a protocol.     Last Rx: 9/15/18 #40  LOV: 12/4/18; upcoming appt 4/1/19

## 2019-03-28 NOTE — TELEPHONE ENCOUNTER
From: Nahomy Pollard  To:  Michelle Cruz MD  Sent: 3/28/2019 3:14 PM CDT  Subject: Boogie Broussard Dr.   I don't have a prescription for this medicine No more I don't know what I'm supposed do to have a Refill   VYVANSE 20MG

## 2019-04-01 ENCOUNTER — OFFICE VISIT (OUTPATIENT)
Dept: FAMILY MEDICINE CLINIC | Facility: CLINIC | Age: 54
End: 2019-04-01
Payer: COMMERCIAL

## 2019-04-01 ENCOUNTER — LAB ENCOUNTER (OUTPATIENT)
Dept: LAB | Age: 54
End: 2019-04-01
Attending: FAMILY MEDICINE
Payer: COMMERCIAL

## 2019-04-01 ENCOUNTER — APPOINTMENT (OUTPATIENT)
Dept: LAB | Age: 54
End: 2019-04-01
Attending: FAMILY MEDICINE
Payer: COMMERCIAL

## 2019-04-01 VITALS
HEIGHT: 64 IN | DIASTOLIC BLOOD PRESSURE: 74 MMHG | HEART RATE: 75 BPM | WEIGHT: 184 LBS | TEMPERATURE: 97 F | BODY MASS INDEX: 31.41 KG/M2 | RESPIRATION RATE: 20 BRPM | SYSTOLIC BLOOD PRESSURE: 115 MMHG

## 2019-04-01 DIAGNOSIS — Z01.818 PREOPERATIVE CLEARANCE: ICD-10-CM

## 2019-04-01 DIAGNOSIS — Z01.818 PREOPERATIVE CLEARANCE: Primary | ICD-10-CM

## 2019-04-01 PROCEDURE — 85025 COMPLETE CBC W/AUTO DIFF WBC: CPT

## 2019-04-01 PROCEDURE — 99396 PREV VISIT EST AGE 40-64: CPT | Performed by: FAMILY MEDICINE

## 2019-04-01 PROCEDURE — 36415 COLL VENOUS BLD VENIPUNCTURE: CPT

## 2019-04-01 PROCEDURE — 93010 ELECTROCARDIOGRAM REPORT: CPT | Performed by: FAMILY MEDICINE

## 2019-04-01 PROCEDURE — 80048 BASIC METABOLIC PNL TOTAL CA: CPT

## 2019-04-01 PROCEDURE — 93005 ELECTROCARDIOGRAM TRACING: CPT

## 2019-04-01 NOTE — PROGRESS NOTES
HPI:   Haroon Salomon is a 47year old female who presents for a complete physical exam.    EXCISION OF VAGINAL FOREIGN BODY, POSSIBLE ANTERIOR/POSTERIOR   RE[AIR, PLACEMENT OF MID URETHRAL SLING, CYSTOSCOPY per Dr. Susan Martinez  There may be mesh there - Lipid screening 5/17/2014    per NG   • Other ill-defined conditions(799.89)     per [employer]:  \"Abnormal pap smear; Management:  colposcopy x3\"   • Vitamin D deficiency       Past Surgical History:   Procedure Laterality Date   • CHOLECYSTECTOMY  2014 BMI 31.58 kg/m²     GENERAL: well developed, well nourished,in no apparent distress  SKIN: no rashes,no suspicious lesions  HEENT: atraumatic, normocephalic,ears and throat are clear  EYES:PERRLA, EOMI, normal optic disk,conjunctiva are clear  NECK: supple

## 2019-04-02 ENCOUNTER — PATIENT MESSAGE (OUTPATIENT)
Dept: FAMILY MEDICINE CLINIC | Facility: CLINIC | Age: 54
End: 2019-04-02

## 2019-04-03 NOTE — TELEPHONE ENCOUNTER
From: Liset Bloom  To:  Corinne Box MD  Sent: 4/2/2019 7:13 PM CDT  Subject: Test Results Question    I have a question about BASIC METABOLIC PANEL (8) resulted on 4/1/19, Julio Serna every it's good and Normal...?????

## 2019-04-03 NOTE — TELEPHONE ENCOUNTER
From: Princess Vegas  To: Raul Gannon MD  Sent: 4/2/2019 7:13 PM CDT  Subject: Test Results Question    I have a question about CBC W/ DIFFERENTIAL resulted on 4/1/19, 6:26 Scott Thomas every it's ok . .. ? ? ??

## 2019-04-07 ENCOUNTER — TELEPHONE (OUTPATIENT)
Dept: FAMILY MEDICINE CLINIC | Facility: CLINIC | Age: 54
End: 2019-04-07

## 2019-04-12 ENCOUNTER — TELEPHONE (OUTPATIENT)
Dept: FAMILY MEDICINE CLINIC | Facility: CLINIC | Age: 54
End: 2019-04-12

## 2019-04-12 NOTE — TELEPHONE ENCOUNTER
Rosemary from Dr Any Mathew office called stating pt has surg on 4/18.  Office is requesting medical clearance

## 2019-04-17 NOTE — H&P
46 y/o female with vaginal pain, pelvic organ prolapse, stress urinary incontinence, & possible vaginal foreign body for surgical mgmt. She reports issues since previous pelvic surgery, unsure of exact prior procedure.  Complains of vag bulging, pain, and u

## 2019-04-17 NOTE — TELEPHONE ENCOUNTER
Rosemary/Dr Genevieve Erickson calling to follow up on request for medical clearance. Was advised that Dr. Henrique Ibarra is out of the office and we will have to discuss with on-call physician. Fx# [phone]     Please advise.

## 2019-04-18 ENCOUNTER — ANESTHESIA (OUTPATIENT)
Dept: SURGERY | Facility: HOSPITAL | Age: 54
End: 2019-04-18
Payer: COMMERCIAL

## 2019-04-18 ENCOUNTER — ANESTHESIA EVENT (OUTPATIENT)
Dept: SURGERY | Facility: HOSPITAL | Age: 54
End: 2019-04-18
Payer: COMMERCIAL

## 2019-04-18 ENCOUNTER — HOSPITAL ENCOUNTER (OUTPATIENT)
Facility: HOSPITAL | Age: 54
Setting detail: HOSPITAL OUTPATIENT SURGERY
Discharge: HOME | End: 2019-04-18
Attending: OBSTETRICS & GYNECOLOGY | Admitting: OBSTETRICS & GYNECOLOGY
Payer: COMMERCIAL

## 2019-04-18 VITALS
HEART RATE: 78 BPM | BODY MASS INDEX: 32.74 KG/M2 | OXYGEN SATURATION: 99 % | DIASTOLIC BLOOD PRESSURE: 64 MMHG | RESPIRATION RATE: 18 BRPM | SYSTOLIC BLOOD PRESSURE: 113 MMHG | TEMPERATURE: 98 F | HEIGHT: 64 IN | WEIGHT: 191.81 LBS

## 2019-04-18 PROCEDURE — 0WQNXZZ REPAIR FEMALE PERINEUM, EXTERNAL APPROACH: ICD-10-PCS | Performed by: OBSTETRICS & GYNECOLOGY

## 2019-04-18 PROCEDURE — 0TSD0ZZ REPOSITION URETHRA, OPEN APPROACH: ICD-10-PCS | Performed by: OBSTETRICS & GYNECOLOGY

## 2019-04-18 PROCEDURE — 0JQC0ZZ REPAIR PELVIC REGION SUBCUTANEOUS TISSUE AND FASCIA, OPEN APPROACH: ICD-10-PCS | Performed by: OBSTETRICS & GYNECOLOGY

## 2019-04-18 PROCEDURE — 88302 TISSUE EXAM BY PATHOLOGIST: CPT | Performed by: OBSTETRICS & GYNECOLOGY

## 2019-04-18 PROCEDURE — 88305 TISSUE EXAM BY PATHOLOGIST: CPT | Performed by: OBSTETRICS & GYNECOLOGY

## 2019-04-18 DEVICE — TRANSVAGINAL MID-URETHRAL SLING
Type: IMPLANTABLE DEVICE | Site: URETHRA | Status: FUNCTIONAL
Brand: ADVANTAGE FIT™ BLUE SYSTEM

## 2019-04-18 RX ORDER — SODIUM CHLORIDE, SODIUM LACTATE, POTASSIUM CHLORIDE, CALCIUM CHLORIDE 600; 310; 30; 20 MG/100ML; MG/100ML; MG/100ML; MG/100ML
INJECTION, SOLUTION INTRAVENOUS CONTINUOUS
Status: DISCONTINUED | OUTPATIENT
Start: 2019-04-18 | End: 2019-04-18

## 2019-04-18 RX ORDER — HYDROMORPHONE HYDROCHLORIDE 1 MG/ML
INJECTION, SOLUTION INTRAMUSCULAR; INTRAVENOUS; SUBCUTANEOUS
Status: COMPLETED
Start: 2019-04-18 | End: 2019-04-18

## 2019-04-18 RX ORDER — CEFAZOLIN SODIUM/WATER 2 G/20 ML
2 SYRINGE (ML) INTRAVENOUS ONCE
Status: COMPLETED | OUTPATIENT
Start: 2019-04-18 | End: 2019-04-18

## 2019-04-18 RX ORDER — ACETAMINOPHEN 500 MG
1000 TABLET ORAL ONCE
Status: DISCONTINUED | OUTPATIENT
Start: 2019-04-18 | End: 2019-04-18

## 2019-04-18 RX ORDER — CEFAZOLIN SODIUM/WATER 2 G/20 ML
SYRINGE (ML) INTRAVENOUS
Status: DISCONTINUED
Start: 2019-04-18 | End: 2019-04-18

## 2019-04-18 RX ORDER — HYDROMORPHONE HYDROCHLORIDE 1 MG/ML
0.4 INJECTION, SOLUTION INTRAMUSCULAR; INTRAVENOUS; SUBCUTANEOUS EVERY 5 MIN PRN
Status: DISCONTINUED | OUTPATIENT
Start: 2019-04-18 | End: 2019-04-18

## 2019-04-18 RX ORDER — IBUPROFEN 600 MG/1
600 TABLET ORAL EVERY 6 HOURS PRN
Status: DISCONTINUED | OUTPATIENT
Start: 2019-04-18 | End: 2019-04-18

## 2019-04-18 RX ORDER — HYDROCODONE BITARTRATE AND ACETAMINOPHEN 5; 325 MG/1; MG/1
2 TABLET ORAL AS NEEDED
Status: COMPLETED | OUTPATIENT
Start: 2019-04-18 | End: 2019-04-18

## 2019-04-18 RX ORDER — BUPIVACAINE HYDROCHLORIDE AND EPINEPHRINE 2.5; 5 MG/ML; UG/ML
INJECTION, SOLUTION EPIDURAL; INFILTRATION; INTRACAUDAL; PERINEURAL AS NEEDED
Status: DISCONTINUED | OUTPATIENT
Start: 2019-04-18 | End: 2019-04-18

## 2019-04-18 RX ORDER — HYDROCODONE BITARTRATE AND ACETAMINOPHEN 5; 325 MG/1; MG/1
1 TABLET ORAL AS NEEDED
Status: COMPLETED | OUTPATIENT
Start: 2019-04-18 | End: 2019-04-18

## 2019-04-18 RX ORDER — NALOXONE HYDROCHLORIDE 0.4 MG/ML
80 INJECTION, SOLUTION INTRAMUSCULAR; INTRAVENOUS; SUBCUTANEOUS AS NEEDED
Status: DISCONTINUED | OUTPATIENT
Start: 2019-04-18 | End: 2019-04-18

## 2019-04-18 RX ORDER — ONDANSETRON 2 MG/ML
4 INJECTION INTRAMUSCULAR; INTRAVENOUS AS NEEDED
Status: DISCONTINUED | OUTPATIENT
Start: 2019-04-18 | End: 2019-04-18

## 2019-04-18 RX ORDER — HYDROCODONE BITARTRATE AND ACETAMINOPHEN 5; 325 MG/1; MG/1
1 TABLET ORAL EVERY 4 HOURS PRN
Qty: 20 TABLET | Refills: 0 | Status: SHIPPED | OUTPATIENT
Start: 2019-04-18 | End: 2019-05-29 | Stop reason: ALTCHOICE

## 2019-04-18 NOTE — OPERATIVE REPORT
Pre Op: Stress urinary incontinence, vaginal pain, vaginal foreign body  Post op: VERONIQUE, vaginal scar, cystocele, rectocele    Procedure: Exam under anesthesia, excision of vaginal scar, anterior repair, posterior colpoperineorrhaphy, midurethral sling, cyst midurethral sling portion of the procedure. Allis clamps were then used to grasp the vaginal epithelium suburethrally and a scalpel was used to make the midline incision after infiltration of .25%marcaine with epinepherine.     Sharp dissection with [employer] catheter was placed in the bladder. Dermabond was utilized to reapproximate the suprapubic incisions.   The patient was then removed from the dorsal lithotomy position, awakened and extubated and transferred from the operating room to the recovery room in s

## 2019-04-18 NOTE — OR NURSING
Dr. Drew Franco notified of pt's weakness to right lower extremity. Pt's VSS, pain tolerable with Norco.  Pt cleared for discharge with crutches, Dr. Drew Franco to call pt tomorrow for update.   Patient and spouse verbalized plan of care and agreed with disch

## 2019-04-18 NOTE — ANESTHESIA POSTPROCEDURE EVALUATION
[address] Patient Status:  Hospital Outpatient Surgery   Age/Gender 47year old female MRN [id number]   [facility] SURGERY Attending Raya Pastrana, [address] Day # 0 PCP Simi Barnett MD       Anesthesia Post-op

## 2019-04-18 NOTE — ANESTHESIA PREPROCEDURE EVALUATION
PRE-OP EVALUATION    Patient Name: Carrie Paget    Pre-op Diagnosis: STRESS INCONTINENCE    Procedure(s):  EXAMINATION UNDER ANESTHESIA, EXCISION OF VAGINAL FOREIGN BODY, POSSIBLE ANTERIOR/POSTERIOR  REPAIR, PLACEMENT OF MID URETHRAL SLING, CYSTOSCOPY Saranya Riggins MD at [address] [place] [address] ENDOSCOPY   • HYSTERECTOMY  1992   • OTHER SURGICAL HISTORY  2008    Bladder surgery   • UPPER GI ENDOSCOPY PERFORMED  2014   • UPPER GI ENDOSCOPY,EXAM       Social History    Tobacco Use      Smoking status: Former Smoker

## 2019-04-19 ENCOUNTER — TELEPHONE (OUTPATIENT)
Dept: UROLOGY | Facility: HOSPITAL | Age: 54
End: 2019-04-19

## 2019-04-19 NOTE — TELEPHONE ENCOUNTER
TC to pt following surgery, EUA, excision of vag scar, anterior repair, MUS, cysto, posterior repair  Feeling better, tolerates ambulation  Taking walks  Doing well, no complaints  Cath draining clear urine,+BM  Pain controlled with PO meds (IBP & tylenol)

## 2019-04-23 ENCOUNTER — TELEPHONE (OUTPATIENT)
Dept: UROLOGY | Facility: HOSPITAL | Age: 54
End: 2019-04-23

## 2019-04-23 NOTE — TELEPHONE ENCOUNTER
Patient called with c/o right leg pain that continues, is taking Motrin every 4 hours, states is using her father's walker to help her get up, very painful changing positions, only walking a few times a day, denies any fever, no constipation, has gonzalez in

## 2019-04-25 ENCOUNTER — OFFICE VISIT (OUTPATIENT)
Dept: UROLOGY | Facility: HOSPITAL | Age: 54
End: 2019-04-25
Attending: OBSTETRICS & GYNECOLOGY
Payer: COMMERCIAL

## 2019-04-25 VITALS
BODY MASS INDEX: 32.61 KG/M2 | DIASTOLIC BLOOD PRESSURE: 68 MMHG | WEIGHT: 191 LBS | HEIGHT: 64 IN | SYSTOLIC BLOOD PRESSURE: 122 MMHG

## 2019-04-25 DIAGNOSIS — Z98.890 POST-OPERATIVE STATE: Primary | ICD-10-CM

## 2019-04-25 PROCEDURE — 99212 OFFICE O/P EST SF 10 MIN: CPT

## 2019-04-25 NOTE — PROGRESS NOTES
She is s/p Post-Op Summary  Procedure Date: 04/18/19  Procedure Name: Anterior and Posterior Repair;Prolene Mesh Mid Urethral Sling(EUA, Excision of Vaginal Scar)  Post-Op Symptoms: Patient denies pain, VERONIQUE, UUI, prolapse symptoms, nausea/vomitting, fevers

## 2019-04-25 NOTE — PATIENT INSTRUCTIONS
Voiding Trial Instructions  You have passed your voiding trial at 0815. Please make sure you are drinking some water today. You can take your Motrin to help with any swelling from the catheter.   It is important to try and empty your bladder every two pauline

## 2019-05-14 ENCOUNTER — OFFICE VISIT (OUTPATIENT)
Dept: UROLOGY | Facility: HOSPITAL | Age: 54
End: 2019-05-14
Attending: OBSTETRICS & GYNECOLOGY
Payer: COMMERCIAL

## 2019-05-14 VITALS
BODY MASS INDEX: 32.61 KG/M2 | HEIGHT: 64 IN | DIASTOLIC BLOOD PRESSURE: 60 MMHG | SYSTOLIC BLOOD PRESSURE: 108 MMHG | WEIGHT: 191 LBS

## 2019-05-14 DIAGNOSIS — N81.84 PELVIC MUSCLE WASTING: ICD-10-CM

## 2019-05-14 DIAGNOSIS — Z98.890 POST-OPERATIVE STATE: Primary | ICD-10-CM

## 2019-05-14 PROCEDURE — 99211 OFF/OP EST MAY X REQ PHY/QHP: CPT

## 2019-05-14 NOTE — PROGRESS NOTES
She is s/p Post-Op Summary  Procedure Date: 04/18/19  Procedure Name: Anterior/Posterior/Enterocele Repair;Prolene Mesh Mid Urethral Sling;Cystoscopy; Other (Please specify in comments)(excision vaginal scar)  Post-Op Symptoms: Patient denies pain, VERONIQUE, UUI

## 2019-05-22 ENCOUNTER — OFFICE VISIT (OUTPATIENT)
Dept: PHYSICAL THERAPY | Facility: HOSPITAL | Age: 54
End: 2019-05-22
Attending: OBSTETRICS & GYNECOLOGY
Payer: COMMERCIAL

## 2019-05-22 DIAGNOSIS — N81.84 PELVIC MUSCLE WASTING: ICD-10-CM

## 2019-05-22 PROCEDURE — 97140 MANUAL THERAPY 1/> REGIONS: CPT

## 2019-05-22 PROCEDURE — 97535 SELF CARE MNGMENT TRAINING: CPT

## 2019-05-22 PROCEDURE — 97161 PT EVAL LOW COMPLEX 20 MIN: CPT

## 2019-05-22 NOTE — PROGRESS NOTES
MUSCULOSKELETAL AND PELVIC FLOOR EVALUATION:   Referring Physician: Dr. Stephany Young  Diagnosis: Pelvic muscle wasting (N81.84)  Date of Onset: 4/18/19     Date of Service: 5/22/2019     PATIENT SUMMARY   Evelyn Queen is a 47year old y/o female who pr Current Pain: 0/10  At Worst: 5/10 (patient states leg gets stuck when moving foot from gas<>break and has to manually assist)  At Best: 1/10  Quality: intermittent; \"stuck\", tight, stabbing   Aggravating factors: see above  Alleviating factors: manual consent was given for external/internal vaginal exam/assessment.  External perineal observation reveals normal tissue health with no concerns and non-TTP throughout pelvic clock bilateral. Internal exam reveals moderate-severe restriction/TTP throughout all COLONOSCOPY  2014   • COLONOSCOPY N/A 6/12/2018    Performed by Michell Hale MD at [facility] ENDOSCOPY   • ESOPHAGOGASTRODUODENOSCOPY (EGD) N/A 6/12/2018    Performed by Michell Hale MD at [facility]   • OTHER leakage  Pain with pelvic exam: yes  Pain with tampon use: N/A  Pain with movement: no    GAIT ANALYSIS  Assistive Device: none  Deviations: WNL   Physical Assistance Needed: none    POSTURE  Mild FHP    RANGE OF MOTION  Lumbar AROM/PROM screen: 50% limite and tenderness moderate restriction, severe restriction, trigger point, pain and tenderness    Bulbocavernosus moderate restriction, severe restriction, trigger point, pain and tenderness moderate restriction, severe restriction, trigger point, pain and te pelvic floor function, pain management techniques, HEP, therapy progression, POC, objective findings. PLAN OF CARE:    Long Term Goals Timeframe (8 weeks, 5/22/19 - 7/19/19)  1.  Patient to be independent with progressive HEP during and upon disch treatment and while under my care.     X___________________________________________________ Date____________________

## 2019-05-28 ENCOUNTER — HOSPITAL ENCOUNTER (OUTPATIENT)
Age: 54
Discharge: HOME | End: 2019-05-28
Payer: COMMERCIAL

## 2019-05-28 VITALS
RESPIRATION RATE: 18 BRPM | HEIGHT: 65 IN | BODY MASS INDEX: 31.99 KG/M2 | DIASTOLIC BLOOD PRESSURE: 58 MMHG | TEMPERATURE: 98 F | WEIGHT: 192 LBS | SYSTOLIC BLOOD PRESSURE: 117 MMHG | OXYGEN SATURATION: 97 % | HEART RATE: 88 BPM

## 2019-05-28 DIAGNOSIS — J02.0 STREPTOCOCCAL SORE THROAT: Primary | ICD-10-CM

## 2019-05-28 PROCEDURE — 87430 STREP A AG IA: CPT

## 2019-05-28 PROCEDURE — 99213 OFFICE O/P EST LOW 20 MIN: CPT

## 2019-05-28 PROCEDURE — 99214 OFFICE O/P EST MOD 30 MIN: CPT

## 2019-05-28 RX ORDER — CEFDINIR 300 MG/1
300 CAPSULE ORAL 2 TIMES DAILY
Qty: 20 CAPSULE | Refills: 0 | Status: SHIPPED | OUTPATIENT
Start: 2019-05-28 | End: 2019-06-07

## 2019-05-28 NOTE — ED PROVIDER NOTES
Patient presents with:  Sore Throat      HPI:     Monse Hudson is a 47year old female who presents for evaluation of a chief complaint of sore throat, body aches, and chills that started yesterday. No difficulty swallowing. Speech is clear.   No diff together: Not on file        Attends Anab[employer]ist service: Not on file        Active member of club or organization: Not on file        Attends meetings of clubs or organizations: Not on file        Relationship status: Not on file      Intimate partner viole exudates noted, redness noted, uvula midline and airway patent  LUNGS: clear to auscultation bilaterally; no rales, rhonchi, or wheezes    MDM/Assessment/Plan:   Orders for this encounter:    Orders Placed This Encounter      POCT Rapid Strep Once      POC

## 2019-05-29 ENCOUNTER — TELEPHONE (OUTPATIENT)
Dept: FAMILY MEDICINE CLINIC | Facility: CLINIC | Age: 54
End: 2019-05-29

## 2019-05-29 ENCOUNTER — APPOINTMENT (OUTPATIENT)
Dept: PHYSICAL THERAPY | Facility: HOSPITAL | Age: 54
End: 2019-05-29
Attending: OBSTETRICS & GYNECOLOGY
Payer: COMMERCIAL

## 2019-05-29 ENCOUNTER — TELEPHONE (OUTPATIENT)
Dept: OTHER | Age: 54
End: 2019-05-29

## 2019-05-29 ENCOUNTER — OFFICE VISIT (OUTPATIENT)
Dept: FAMILY MEDICINE CLINIC | Facility: CLINIC | Age: 54
End: 2019-05-29
Payer: COMMERCIAL

## 2019-05-29 VITALS
HEIGHT: 64 IN | SYSTOLIC BLOOD PRESSURE: 104 MMHG | WEIGHT: 198 LBS | HEART RATE: 98 BPM | DIASTOLIC BLOOD PRESSURE: 65 MMHG | BODY MASS INDEX: 33.8 KG/M2 | TEMPERATURE: 99 F

## 2019-05-29 DIAGNOSIS — J01.00 ACUTE MAXILLARY SINUSITIS, RECURRENCE NOT SPECIFIED: Primary | ICD-10-CM

## 2019-05-29 PROCEDURE — 99212 OFFICE O/P EST SF 10 MIN: CPT | Performed by: FAMILY MEDICINE

## 2019-05-29 PROCEDURE — 99213 OFFICE O/P EST LOW 20 MIN: CPT | Performed by: FAMILY MEDICINE

## 2019-05-29 RX ORDER — CODEINE PHOSPHATE AND GUAIFENESIN 10; 100 MG/5ML; MG/5ML
10 SOLUTION ORAL 3 TIMES DAILY PRN
Qty: 200 ML | Refills: 0 | Status: SHIPPED | OUTPATIENT
Start: 2019-05-29 | End: 2019-06-08

## 2019-05-29 NOTE — PROGRESS NOTES
HPI:   Hang Mancia is a 47year old female who presents for upper respiratory symptoms for  3  days. Patient reports sore throat, congestion. Yesterday went to IC and given cefdinir. Reports bad chest pain and ears.      Current Outpatient Medications Tobacco Use      Smoking status: Former Smoker        Packs/day: 0.10        Years: 8.00        Pack years: .8        Types: Cigarettes        Start date: 1999        Quit date: 2012        Years since quittin.8      Smokeless tobacco: Former

## 2019-06-04 ENCOUNTER — OFFICE VISIT (OUTPATIENT)
Dept: PHYSICAL THERAPY | Facility: HOSPITAL | Age: 54
End: 2019-06-04
Attending: OBSTETRICS & GYNECOLOGY
Payer: COMMERCIAL

## 2019-06-04 PROCEDURE — 97535 SELF CARE MNGMENT TRAINING: CPT

## 2019-06-04 PROCEDURE — 97140 MANUAL THERAPY 1/> REGIONS: CPT

## 2019-06-04 NOTE — PROGRESS NOTES
Dx: Pelvic muscle wasting (N81.84)          Authorized # of Visits/Insurance:  [facility] PPO  Script Dates: 5/22/19 - 7/19/19           Next MD visit: none scheduled  Referring MD: Anant Doherty  Fall Risk:  standard         Precautions: bladder sling repair, maneuver (2-3x): limited    Today's Treatment   6/4/2019   Visit: 2   Notes/Precautions:    HEP    5/22: Female pelvic floor anatomy, bladder normatives/health, diaphragmatic breathing, PF holds (2x10)  6/4: bladder inhibition technique   Warm Up MHP appli to improve tolerance/safety with driving. 4. Patient to report 50% reduction in pain with internal vaginal exam to allow for yearly pelvic exams as necessary and return to intimacy with partner.      Patient Goal: \"intercourse to be painfree, be able to

## 2019-06-11 ENCOUNTER — APPOINTMENT (OUTPATIENT)
Dept: PHYSICAL THERAPY | Facility: HOSPITAL | Age: 54
End: 2019-06-11
Attending: OBSTETRICS & GYNECOLOGY
Payer: COMMERCIAL

## 2019-06-18 ENCOUNTER — OFFICE VISIT (OUTPATIENT)
Dept: PHYSICAL THERAPY | Facility: HOSPITAL | Age: 54
End: 2019-06-18
Attending: OBSTETRICS & GYNECOLOGY
Payer: COMMERCIAL

## 2019-06-18 PROCEDURE — 97140 MANUAL THERAPY 1/> REGIONS: CPT

## 2019-06-18 PROCEDURE — 97112 NEUROMUSCULAR REEDUCATION: CPT

## 2019-06-18 NOTE — PROGRESS NOTES
Dx: Pelvic muscle wasting (N81.84)          Authorized # of Visits/Insurance:  [facility] PPO  Script Dates: 5/22/19 - 7/19/19           Next MD visit: none scheduled  Referring MD: Micky Tejada  Fall Risk:  standard         Precautions: bladder sling repair, limited, reflexive tightening     Today's Treatment   6/4/2019   Visit: 2 6/18/2019  Visit: 3  Notes/Precautions:    HEP    5/22: Female pelvic floor anatomy, bladder normatives/health, diaphragmatic breathing, PF holds (2x10)  6/4: bladder inhibition tech with progression of strengthening as tolerated. Charges: 2 Man, 1 NMR       Total Timed Treatment: 40 min  Total Treatment Time: 45 min  Therapist: Efrain Khanna PT, DPT    Long Term Goals Timeframe (8 weeks, 5/22/19 - 7/19/19)  1.  Patient to be indepen

## 2019-06-25 ENCOUNTER — OFFICE VISIT (OUTPATIENT)
Dept: PHYSICAL THERAPY | Facility: HOSPITAL | Age: 54
End: 2019-06-25
Attending: OBSTETRICS & GYNECOLOGY
Payer: COMMERCIAL

## 2019-06-25 PROCEDURE — 97140 MANUAL THERAPY 1/> REGIONS: CPT

## 2019-06-25 NOTE — PROGRESS NOTES
Dx: Pelvic muscle wasting (N81.84)          Authorized # of Visits/Insurance:  BCBS PPO  Script Dates: 5/22/19 - 7/19/19           Next MD visit: none scheduled  Referring MD: Joe Barahona  Fall Risk:  standard         Precautions: bladder sling repair, bladder inhibition technique          Assessment: Mild-moderate STRs present along proximal hip flexors/adductors and lower abdominals. Addressed with external manual techniques with patient tolerating well and notable release achieved.  Instructed patient

## 2019-06-27 NOTE — TELEPHONE ENCOUNTER
Addressed at office visit on 10/24/17. Follow-up Pulm Progress Note    Non-verbal  Awake, tachypneic, 94% on 2L NC  Junky cough       Medications:  MEDICATIONS  (STANDING):  ALBUTerol/ipratropium for Nebulization 3 milliLiter(s) Nebulizer every 6 hours  heparin  Injectable 5000 Unit(s) SubCutaneous every 8 hours  meropenem  IVPB 1000 milliGRAM(s) IV Intermittent every 8 hours  OLANZapine 5 milliGRAM(s) Oral daily  pantoprazole  Injectable 40 milliGRAM(s) IV Push daily  polyethylene glycol 3350 17 Gram(s) Oral two times a day  QUEtiapine 100 milliGRAM(s) Oral daily  sucralfate 1 Gram(s) Oral two times a day  vancomycin  IVPB 1250 milliGRAM(s) IV Intermittent every 24 hours    MEDICATIONS  (PRN):  acetaminophen    Suspension .. 650 milliGRAM(s) Oral every 6 hours PRN Temp greater or equal to 38C (100.4F), Mild Pain (1 - 3), Moderate Pain (4 - 6)  acetaminophen  Suppository .. 650 milliGRAM(s) Rectal every 6 hours PRN Temp greater or equal to 38C (100.4F), Mild Pain (1 - 3), Moderate Pain (4 - 6)  bisacodyl Suppository 10 milliGRAM(s) Rectal daily PRN Constipation          Vital Signs Last 24 Hrs  T(C): 37.7 (27 Jun 2019 15:13), Max: 38.1 (27 Jun 2019 09:46)  T(F): 99.8 (27 Jun 2019 15:13), Max: 100.6 (27 Jun 2019 09:46)  HR: 108 (27 Jun 2019 15:13) (98 - 119)  BP: 103/57 (27 Jun 2019 15:13) (92/55 - 113/75)  BP(mean): --  RR: 19 (27 Jun 2019 15:13) (18 - 19)  SpO2: 95% (27 Jun 2019 15:13) (94% - 98%) on 2l NC          06-26 @ 07:01  -  06-27 @ 07:00  --------------------------------------------------------  IN: 1420 mL / OUT: 0 mL / NET: 1420 mL          LABS:                        8.0    10.44 )-----------( 199      ( 27 Jun 2019 09:56 )             25.2     06-27    142  |  104  |  15  ----------------------------<  149<H>  3.7   |  25  |  0.58    Ca    8.6      27 Jun 2019 06:33  Mg     2.1     06-27            CAPILLARY BLOOD GLUCOSE                            CULTURES: (if applicable)  Culture Results:   Growth in aerobic bottle: Staphylococcus hominis "Susceptibilities not  performed" (06-24 @ 19:17)  Culture Results:   Growth in anaerobic bottle: Staphylococcus epidermidis "Susceptibilities  not performed"  "Due to technical problems, Proteus sp. will Not be reported as part of  the BCID panel until further notice"  ***Blood Panel PCR results on this specimen areavailable  approximately 3 hours after the Gram stain result.***  Gram stain, PCR, and/or culture results may not always  correspond due to difference in methodologies.  ************************************************************  This PCR assay wasperformed using [employer].  The following targets are tested for: Enterococcus,  vancomycin resistant enterococci, Listeria monocytogenes,  coagulase negative staphylococci, S. aureus,  methicillin resistant S. aureus, Streptococcus agalactiae  (Group B), S. pneumoniae, S. pyogenes (Group A),  Acinetobacter baumannii, Enterobacter cloacae, E. coli,  Klebsiella oxytoca, K. pneumoniae, Proteus sp.,  Serratia marcescens, Haemophilus influenzae,  Neisseria meningitidis, Pseudomonas aeruginosa, Candida  albicans, C. glabrata, C krusei, C parapsilosis,  C. tropicalis and the KPC resistance gene. (06-22 @ 22:14)  Culture Results:   No growth to date. (06-22 @ 15:14)  Culture Results:   No growth (06-22 @ 01:26)    Most recent blood culture -- 06-24 @ 19:17   -- -- .Blood 06-24 @ 19:17  Most recent blood culture -- 06-22 @ 22:14   Blood Culture PCR Blood Culture PCR .Blood 06-22 @ 22:14    Blood culture 06-24 @ 19:17  --    Growth in aerobic bottle: Gram Positive Cocci in Clusters  --  --  --    Urine culture    -->  Blood culture 06-22 @ 22:14  --    Growth in anaerobic bottle: Gram Positive Cocci in Clusters  PCR  Blood Culture PCR  Blood Culture PCR    Urine culture    -->      Physical Examination:  PULM: Decreased BS bilaterally   CVS: S1, S2 heard    RADIOLOGY REVIEWED  CT chest: < from: CT Chest w/ IV Cont (06.24.19 @ 21:27) >  CHEST:     LUNGS AND LARGE AIRWAYS: Patent central airways. Right basilar linear   type atelectasis. Patchy opacities throughout the left lung.  PLEURA: No pleural effusion.  VESSELS: Atherosclerotic changes.  HEART: Heart size is normal. No pericardial effusion.  MEDIASTINUM AND LENA: No axillary lymphadenopathy. An AP window lymph   node (2:52) measuring 1.3 x 1.1 cm. A left perihilar lymph node (3:83)   measuring 1.1 x 1.0 cm.  CHEST WALL AND LOWER NECK: Within normal limits.     < end of copied text >

## 2019-07-01 ENCOUNTER — TELEPHONE (OUTPATIENT)
Dept: PHYSICAL THERAPY | Facility: HOSPITAL | Age: 54
End: 2019-07-01

## 2019-07-02 ENCOUNTER — APPOINTMENT (OUTPATIENT)
Dept: PHYSICAL THERAPY | Facility: HOSPITAL | Age: 54
End: 2019-07-02
Attending: OBSTETRICS & GYNECOLOGY
Payer: COMMERCIAL

## 2019-07-09 ENCOUNTER — OFFICE VISIT (OUTPATIENT)
Dept: PHYSICAL THERAPY | Facility: HOSPITAL | Age: 54
End: 2019-07-09
Attending: OBSTETRICS & GYNECOLOGY
Payer: COMMERCIAL

## 2019-07-09 PROCEDURE — 97140 MANUAL THERAPY 1/> REGIONS: CPT

## 2019-07-09 NOTE — PROGRESS NOTES
Physical Therapy Discharge Summary  Reporting Period: 5/22/19 - 7/9/19  Dx: Pelvic muscle wasting (N81.84)          Authorized # of Visits/Insurance:  BCBS PPO  Script Dates: 5/22/19 - 7/19/19           Next MD visit: none scheduled  Referring MD: Kiran De La Cruz Other        SPECIAL TESTS  N/A    PELVIC EXAMINATION  Verbal consent given for internal examination: yes    External Observation  Mons pubis: WNL  Labia majora: WNL  Labia minora:  WNL  Urethral meatus: WNL  Introitus: other: moderate scar tissue/restric hip adductors x10 min   R psoas release  Trigger point/CT stretching: R proximal hip adductors/flexors, pectineus x20 min  Lower abdominal stretching, STM x10 min  Review of HEP   SCS: B PC/IC 2x30\" ea  Review of PF activation with external palpation   RO be able to actively flex/adduct R hip with no more than 2/10 pain to improve tolerance/safety with driving. (met 1/3/91)  4.  Patient to report 50% reduction in pain with internal vaginal exam to allow for yearly pelvic exams as necessary and return to inti

## 2019-08-09 ENCOUNTER — PATIENT MESSAGE (OUTPATIENT)
Dept: FAMILY MEDICINE CLINIC | Facility: CLINIC | Age: 54
End: 2019-08-09

## 2019-08-10 NOTE — TELEPHONE ENCOUNTER
From: Jorje Damon  To:  Joselito Morgan MD  Sent: 2019 5:07 PM CDT  Subject: Krystal Mckee Dr. I want to request a prescription it's  and is for VYVANSE 20 MG     Please let me know if you have any question     Thank you,

## 2019-08-10 NOTE — PROGRESS NOTES
Please see My Chart message and advise. Thank you. Controlled medication pending for review.      Last Rx: 5/29/19  LOV: 5/29/19

## 2019-08-13 ENCOUNTER — OFFICE VISIT (OUTPATIENT)
Dept: UROLOGY | Facility: HOSPITAL | Age: 54
End: 2019-08-13
Attending: OBSTETRICS & GYNECOLOGY
Payer: COMMERCIAL

## 2019-08-13 VITALS
HEIGHT: 64 IN | BODY MASS INDEX: 33.8 KG/M2 | WEIGHT: 198 LBS | DIASTOLIC BLOOD PRESSURE: 60 MMHG | SYSTOLIC BLOOD PRESSURE: 136 MMHG

## 2019-08-13 DIAGNOSIS — Z98.890 POST-OPERATIVE STATE: ICD-10-CM

## 2019-08-13 DIAGNOSIS — N81.84 PELVIC MUSCLE WASTING: ICD-10-CM

## 2019-08-13 DIAGNOSIS — N95.2 POSTMENOPAUSAL ATROPHIC VAGINITIS: Primary | ICD-10-CM

## 2019-08-13 PROCEDURE — 99211 OFF/OP EST MAY X REQ PHY/QHP: CPT

## 2019-08-13 RX ORDER — ESTRADIOL 0.1 MG/G
CREAM VAGINAL
Qty: 1 TUBE | Refills: 3 | Status: SHIPPED | OUTPATIENT
Start: 2019-08-13 | End: 2020-01-15

## 2019-08-13 NOTE — PROGRESS NOTES
She is s/p Post-Op Summary  Procedure Date: 04/18/19  Procedure Name: Vaginal/Vulvar Lesion Excision; Anterior and Posterior Repair;Cystoscopy;Prolene Mesh Mid Urethral Sling  Post-Op Symptoms: Patient denies pain, VERONIQUE, UUI, prolapse symptoms, nausea/vomitt

## 2019-09-15 ENCOUNTER — PATIENT MESSAGE (OUTPATIENT)
Dept: FAMILY MEDICINE CLINIC | Facility: CLINIC | Age: 54
End: 2019-09-15

## 2019-09-16 NOTE — TELEPHONE ENCOUNTER
From: Natalia Ramirez  To:  Richard Dumas MD  Sent: 9/15/2019 8:02 PM CDT  Subject: Referral Alonzo Dawn the reason for my message is that I need my medication I already have an appointment for a follow up for my medication but is until 1

## 2019-10-02 NOTE — PROGRESS NOTES
Claudio Arndt is a 49year old female. Patient presents with:  Medication Follow-Up    HPI:   Reports feeling good on the vyvanse. Reports has more energy and losing weight. Has been exercising. Overall feeling better.      Current Outpatient Medications headaches  Musculoskeletal: no joint pain, back pain    EXAM:   /87   Pulse 88   Temp 97.3 °F (36.3 °C) (Oral)   Ht 5' 4\" (1.626 m)   Wt 190 lb 3.2 oz (86.3 kg)   BMI 32.65 kg/m²   GENERAL: well developed, well nourished,in no apparent distress  SKI

## 2019-12-01 RX ORDER — FLUTICASONE PROPIONATE 50 MCG
SPRAY, SUSPENSION (ML) NASAL
Qty: 16 G | Refills: 1 | Status: SHIPPED | OUTPATIENT
Start: 2019-12-01 | End: 2020-01-21

## 2020-01-15 NOTE — PROGRESS NOTES
Haroon Salomon is a 49year old female. Patient presents with:  Numbness: Pt has been having on and off numbness in hands and arms, noticed it approx 3-4 weeks    HPI:   Follow up on weight. Reports doing well with vyvanse.  Reports was not exercising but feels well otherwise  SKIN: denies any unusual skin lesions or rashes  HEENT: denies eye complaints,denies sore throat, denies ear pain  RESPIRATORY: denies shortness of breath, denies cough  CARDIOVASCULAR: denies chest pain  GI: denies abdominal pain and

## 2020-01-21 RX ORDER — FLUTICASONE PROPIONATE 50 MCG
SPRAY, SUSPENSION (ML) NASAL
Qty: 16 G | Refills: 1 | Status: SHIPPED | OUTPATIENT
Start: 2020-01-21 | End: 2020-01-22

## 2020-01-22 RX ORDER — FLUTICASONE PROPIONATE 50 MCG
SPRAY, SUSPENSION (ML) NASAL
Qty: 48 G | Refills: 0 | Status: SHIPPED | OUTPATIENT
Start: 2020-01-22 | End: 2020-10-08

## 2020-01-22 NOTE — TELEPHONE ENCOUNTER
Refill passed per [facility] protocol.   Refill Protocol Appointment Criteria  · Appointment scheduled in the past 12 months or in the next 3 months  Recent Outpatient Visits            6 days ago Binge eating disorder    [facility], [address],

## 2020-02-18 ENCOUNTER — OFFICE VISIT (OUTPATIENT)
Dept: FAMILY MEDICINE CLINIC | Facility: CLINIC | Age: 55
End: 2020-02-18
Payer: COMMERCIAL

## 2020-02-18 VITALS
HEART RATE: 86 BPM | SYSTOLIC BLOOD PRESSURE: 124 MMHG | DIASTOLIC BLOOD PRESSURE: 78 MMHG | TEMPERATURE: 98 F | HEIGHT: 64 IN | BODY MASS INDEX: 32.61 KG/M2 | WEIGHT: 191 LBS

## 2020-02-18 DIAGNOSIS — H66.003 NON-RECURRENT ACUTE SUPPURATIVE OTITIS MEDIA OF BOTH EARS WITHOUT SPONTANEOUS RUPTURE OF TYMPANIC MEMBRANES: Primary | ICD-10-CM

## 2020-02-18 DIAGNOSIS — T36.95XA ANTIBIOTIC-INDUCED YEAST INFECTION: ICD-10-CM

## 2020-02-18 DIAGNOSIS — B37.9 ANTIBIOTIC-INDUCED YEAST INFECTION: ICD-10-CM

## 2020-02-18 PROCEDURE — 99213 OFFICE O/P EST LOW 20 MIN: CPT | Performed by: NURSE PRACTITIONER

## 2020-02-18 RX ORDER — AMOXICILLIN AND CLAVULANATE POTASSIUM 875; 125 MG/1; MG/1
1 TABLET, FILM COATED ORAL 2 TIMES DAILY
Qty: 14 TABLET | Refills: 0 | Status: SHIPPED | OUTPATIENT
Start: 2020-02-18 | End: 2020-02-25

## 2020-02-18 RX ORDER — FLUCONAZOLE 200 MG/1
TABLET ORAL
Qty: 2 TABLET | Refills: 0 | Status: SHIPPED | OUTPATIENT
Start: 2020-02-18 | End: 2020-08-27

## 2020-02-18 NOTE — PROGRESS NOTES
HPI    Patient presents for right ear discomfort x 1 month. Comes and goes. Used to get a lot of ear infections as a child. Denies drainage and difficulty hearing. Review of Systems   HENT: Positive for ear pain.  Negative for ear discharge and hear Not on file    Social Needs      Financial resource strain: Not on file      Food insecurity:        Worry: Not on file        Inability: Not on file      Transportation needs:        Medical: Not on file        Non-medical: Not on file    Tobacco Use has a pacemaker: No        Pt has a defibrillator: No        Breast feeding: Not Asked        Reaction to local anesthetic: No    Social History Narrative      Not on file      Current Outpatient Medications   Medication Sig Dispense Refill   • Amoxicillin sounds. No murmur heard. Pulmonary/Chest: Effort normal and breath sounds normal. No respiratory distress. She has no wheezes. She has no rales. Lymphadenopathy:     She has no cervical adenopathy.    Neurological: She is alert and oriented to person,

## 2020-02-21 ENCOUNTER — PATIENT MESSAGE (OUTPATIENT)
Dept: FAMILY MEDICINE CLINIC | Facility: CLINIC | Age: 55
End: 2020-02-21

## 2020-02-21 NOTE — TELEPHONE ENCOUNTER
Spoke with [facility] pharmacist they have the prescriptions and will fill the Vyvanse for patient. Called patient left detailed message that her medication will be ready for  by 11:30 am today and to call pharmacy with any questions.

## 2020-02-21 NOTE — TELEPHONE ENCOUNTER
RN pls call [facility] and verify rx refill, when completed pls inform pt via [employer], thanks. LR 1-15-20 # 3 + 2      Duplicate request, previously addressed.     Requested Prescriptions     Pending Prescriptions Disp Refills   • Lisdexamfetamine Dim

## 2020-02-21 NOTE — TELEPHONE ENCOUNTER
From: Natalia Ramirez  To: Jam Coon MD  Sent: 2/21/2020 2:33 AM CST  Subject: Other    Good morning Dr. Farfan I need a refill for my medication  Vyvanse 20MG   have a good day  Thank You,

## 2020-02-24 ENCOUNTER — HOSPITAL ENCOUNTER (OUTPATIENT)
Dept: MAMMOGRAPHY | Age: 55
Discharge: HOME | End: 2020-02-24
Attending: FAMILY MEDICINE
Payer: COMMERCIAL

## 2020-02-24 DIAGNOSIS — Z12.31 VISIT FOR SCREENING MAMMOGRAM: ICD-10-CM

## 2020-02-24 PROCEDURE — 77063 BREAST TOMOSYNTHESIS BI: CPT | Performed by: FAMILY MEDICINE

## 2020-02-24 PROCEDURE — 77067 SCR MAMMO BI INCL CAD: CPT | Performed by: FAMILY MEDICINE

## 2020-04-10 ENCOUNTER — TELEPHONE (OUTPATIENT)
Dept: UROLOGY | Facility: HOSPITAL | Age: 55
End: 2020-04-10

## 2020-04-27 NOTE — TELEPHONE ENCOUNTER
Requested Prescriptions     Pending Prescriptions Disp Refills   • Pantoprazole Sodium 40 MG Oral Tab EC 90 tablet 3     Sig: Take 1 tablet (40 mg total) by mouth every morning before breakfast.     LOV-6/12/18  LR-3/7/19

## 2020-04-29 RX ORDER — PANTOPRAZOLE SODIUM 40 MG/1
40 TABLET, DELAYED RELEASE ORAL
Qty: 90 TABLET | Refills: 3 | Status: SHIPPED | OUTPATIENT
Start: 2020-04-29 | End: 2020-10-08

## 2020-05-10 RX ORDER — MELOXICAM 15 MG/1
TABLET ORAL
Qty: 30 TABLET | Refills: 3 | Status: SHIPPED | OUTPATIENT
Start: 2020-05-10 | End: 2020-09-05

## 2020-05-18 ENCOUNTER — PATIENT MESSAGE (OUTPATIENT)
Dept: FAMILY MEDICINE CLINIC | Facility: CLINIC | Age: 55
End: 2020-05-18

## 2020-05-19 NOTE — TELEPHONE ENCOUNTER
From: Gab Ortega  To: Shari Negrete MD  Sent: 5/18/2020 9:28 PM CDT  Subject: Referral Request    Hi  I need a referral for VYVANSE   20 MG    Thank You,   Have a Good Morning. .!!

## 2020-06-11 ENCOUNTER — NURSE TRIAGE (OUTPATIENT)
Dept: FAMILY MEDICINE CLINIC | Facility: CLINIC | Age: 55
End: 2020-06-11

## 2020-06-11 ENCOUNTER — PATIENT MESSAGE (OUTPATIENT)
Dept: FAMILY MEDICINE CLINIC | Facility: CLINIC | Age: 55
End: 2020-06-11

## 2020-06-11 ENCOUNTER — TELEMEDICINE (OUTPATIENT)
Dept: FAMILY MEDICINE CLINIC | Facility: CLINIC | Age: 55
End: 2020-06-11

## 2020-06-11 ENCOUNTER — E-VISIT (OUTPATIENT)
Dept: FAMILY MEDICINE CLINIC | Facility: CLINIC | Age: 55
End: 2020-06-11

## 2020-06-11 DIAGNOSIS — Z02.9 ENCOUNTERS FOR ADMINISTRATIVE PURPOSE: Primary | ICD-10-CM

## 2020-06-11 DIAGNOSIS — B02.9 HERPES ZOSTER WITHOUT COMPLICATION: Primary | ICD-10-CM

## 2020-06-11 PROCEDURE — 99213 OFFICE O/P EST LOW 20 MIN: CPT | Performed by: FAMILY MEDICINE

## 2020-06-11 RX ORDER — GABAPENTIN 100 MG/1
100 CAPSULE ORAL 3 TIMES DAILY
Qty: 30 CAPSULE | Refills: 0 | Status: SHIPPED | OUTPATIENT
Start: 2020-06-11 | End: 2020-09-23

## 2020-06-11 RX ORDER — VALACYCLOVIR HYDROCHLORIDE 1 G/1
1 TABLET, FILM COATED ORAL EVERY 8 HOURS
Qty: 21 TABLET | Refills: 0 | Status: SHIPPED | OUTPATIENT
Start: 2020-06-11 | End: 2020-06-18

## 2020-06-11 NOTE — TELEPHONE ENCOUNTER
Please call patient and triage regarding MyChart message copied here. [employer] message response sent in original MyChart encounter, per department process.   ----- Message from Haroon Salomon sent at 6/11/2020  7:27 AM CDT -----  Regarding: Other  Contact:

## 2020-06-11 NOTE — TELEPHONE ENCOUNTER
From: Corrina Downey  To: Michell Fletcher MD  Sent: 6/11/2020 7:27 AM CDT  Subject: Other    Good morning , I am sending you this message because I having hives on my skin  some on the left leg and on my back a little above my buttocks  I have no itch

## 2020-06-11 NOTE — TELEPHONE ENCOUNTER
Action Requested: Summary for Provider     []  Critical Lab, Recommendations Needed  [] Need Additional Advice  []   FYI    []   Need Orders  [] Need Medications Sent to Pharmacy  []  Other     SUMMARY:   Rash to posterior right lower leg, and to her anter

## 2020-06-11 NOTE — PROGRESS NOTES
Virtual/Video by Tom Mclaughlin 36 verbally consents to a Virtual/Video by [employer] on 06/11/20. Patient has been referred to the [facility] website at [email or web address] to review the yearly Consent to Treat document. conditions(799.89)     per [employer]:  \"Abnormal pap smear; Management:  colposcopy x3\"   • Vitamin D deficiency       Social History:  Social History    Tobacco Use      Smoking status: Former Smoker        Packs/day: 0.10        Years: 8.00        Pack y

## 2020-06-15 NOTE — PROGRESS NOTES
PT had telemedicine visit same day as this e-visit to address her concerns. No charge for this e-visit.

## 2020-07-21 ENCOUNTER — TELEPHONE (OUTPATIENT)
Dept: FAMILY MEDICINE CLINIC | Facility: CLINIC | Age: 55
End: 2020-07-21

## 2020-07-21 NOTE — TELEPHONE ENCOUNTER
Spoke with the patient who confirmed several times she does not have an active shingles infection. Patient reports she did have shingles 2 months ago and it resolved. Patient reports she does have some questions about Shingles that she wants to ask Dr. Hermes Knight about during the physical appointment. She also wanted to ask Dr. Hermes Knight during the visit about the Shingles vaccine.  Call was transferred to the call center to assist the patient is scheduling a sooner appointment for the physical.

## 2020-08-07 ENCOUNTER — OFFICE VISIT (OUTPATIENT)
Dept: UROLOGY | Facility: HOSPITAL | Age: 55
End: 2020-08-07
Attending: OBSTETRICS & GYNECOLOGY
Payer: COMMERCIAL

## 2020-08-07 VITALS
BODY MASS INDEX: 32.61 KG/M2 | HEIGHT: 64 IN | DIASTOLIC BLOOD PRESSURE: 76 MMHG | SYSTOLIC BLOOD PRESSURE: 110 MMHG | WEIGHT: 191 LBS

## 2020-08-07 DIAGNOSIS — N95.2 POSTMENOPAUSAL ATROPHIC VAGINITIS: ICD-10-CM

## 2020-08-07 DIAGNOSIS — Z98.890 POST-OPERATIVE STATE: ICD-10-CM

## 2020-08-07 DIAGNOSIS — N81.84 PELVIC MUSCLE WASTING: Primary | ICD-10-CM

## 2020-08-07 PROCEDURE — 99212 OFFICE O/P EST SF 10 MIN: CPT

## 2020-08-07 NOTE — PROGRESS NOTES
She is s/p Post-Op Summary  Procedure Date: 04/18/19  Procedure Name: Anterior Repair;Posterior Repair;Cystoscopy;Prolene Mesh Mid Urethral Sling  Post-Op Symptoms: Patient denies pain, VERONIQUE, UUI, prolapse symptoms, nausea/vomitting, fevers/chills, bleeding

## 2020-08-27 ENCOUNTER — OFFICE VISIT (OUTPATIENT)
Dept: FAMILY MEDICINE CLINIC | Facility: CLINIC | Age: 55
End: 2020-08-27
Payer: COMMERCIAL

## 2020-08-27 VITALS
HEART RATE: 99 BPM | DIASTOLIC BLOOD PRESSURE: 88 MMHG | BODY MASS INDEX: 32.55 KG/M2 | TEMPERATURE: 97 F | HEIGHT: 64 IN | WEIGHT: 190.63 LBS | SYSTOLIC BLOOD PRESSURE: 120 MMHG

## 2020-08-27 DIAGNOSIS — R10.2 PELVIC PAIN: ICD-10-CM

## 2020-08-27 DIAGNOSIS — S16.1XXA NECK STRAIN: ICD-10-CM

## 2020-08-27 DIAGNOSIS — M54.50 ACUTE BILATERAL LOW BACK PAIN WITHOUT SCIATICA: Primary | ICD-10-CM

## 2020-08-27 PROCEDURE — 3079F DIAST BP 80-89 MM HG: CPT | Performed by: FAMILY MEDICINE

## 2020-08-27 PROCEDURE — 3074F SYST BP LT 130 MM HG: CPT | Performed by: FAMILY MEDICINE

## 2020-08-27 PROCEDURE — 99214 OFFICE O/P EST MOD 30 MIN: CPT | Performed by: FAMILY MEDICINE

## 2020-08-27 PROCEDURE — 3008F BODY MASS INDEX DOCD: CPT | Performed by: FAMILY MEDICINE

## 2020-08-27 NOTE — PROGRESS NOTES
Monse Hudson is a 48year old female. Patient presents with:  Back Pain    HPI:   More than 1 month that every morning wakes up with bad pain in lower back and also having pain in front by her ovaries. Had hysterectomy but not ovaries. No nausea.  No drinks      Comment: socially    Drug use: No       REVIEW OF SYSTEMS:   GENERAL HEALTH: feels well otherwise  SKIN: denies any unusual skin lesions or rashes  HEENT: denies eye complaints,denies sore throat, denies ear pain  RESPIRATORY: denies shortness

## 2020-08-29 RX ORDER — CYCLOBENZAPRINE HCL 10 MG
10 TABLET ORAL NIGHTLY
Qty: 40 TABLET | Refills: 0 | Status: SHIPPED | OUTPATIENT
Start: 2020-08-29 | End: 2020-10-12 | Stop reason: ALTCHOICE

## 2020-08-29 RX ORDER — CYCLOBENZAPRINE HCL 10 MG
10 TABLET ORAL NIGHTLY
Qty: 40 TABLET | Refills: 0 | OUTPATIENT
Start: 2020-08-29

## 2020-09-05 RX ORDER — MELOXICAM 15 MG/1
TABLET ORAL
Qty: 30 TABLET | Refills: 3 | Status: SHIPPED | OUTPATIENT
Start: 2020-09-05 | End: 2020-10-08

## 2020-09-15 ENCOUNTER — HOSPITAL ENCOUNTER (OUTPATIENT)
Dept: ULTRASOUND IMAGING | Age: 55
Discharge: HOME | End: 2020-09-15
Attending: FAMILY MEDICINE
Payer: COMMERCIAL

## 2020-09-15 DIAGNOSIS — R10.2 PELVIC PAIN: ICD-10-CM

## 2020-09-15 PROCEDURE — 76830 TRANSVAGINAL US NON-OB: CPT | Performed by: FAMILY MEDICINE

## 2020-09-15 PROCEDURE — 76856 US EXAM PELVIC COMPLETE: CPT | Performed by: FAMILY MEDICINE

## 2020-09-19 NOTE — PROGRESS NOTES
Deborah - Ultrasound of your uterus was completely normal. If you are still having pain, please follow up. - Dr. Malik Drought

## 2020-09-23 ENCOUNTER — OFFICE VISIT (OUTPATIENT)
Dept: FAMILY MEDICINE CLINIC | Facility: CLINIC | Age: 55
End: 2020-09-23
Payer: COMMERCIAL

## 2020-09-23 VITALS
TEMPERATURE: 97 F | DIASTOLIC BLOOD PRESSURE: 69 MMHG | BODY MASS INDEX: 33 KG/M2 | HEART RATE: 93 BPM | SYSTOLIC BLOOD PRESSURE: 119 MMHG | WEIGHT: 191 LBS

## 2020-09-23 DIAGNOSIS — R10.13 EPIGASTRIC PAIN: Primary | ICD-10-CM

## 2020-09-23 PROCEDURE — 99214 OFFICE O/P EST MOD 30 MIN: CPT | Performed by: FAMILY MEDICINE

## 2020-09-23 PROCEDURE — 3078F DIAST BP <80 MM HG: CPT | Performed by: FAMILY MEDICINE

## 2020-09-23 PROCEDURE — 90686 IIV4 VACC NO PRSV 0.5 ML IM: CPT | Performed by: FAMILY MEDICINE

## 2020-09-23 PROCEDURE — 3074F SYST BP LT 130 MM HG: CPT | Performed by: FAMILY MEDICINE

## 2020-09-23 PROCEDURE — 90471 IMMUNIZATION ADMIN: CPT | Performed by: FAMILY MEDICINE

## 2020-09-23 NOTE — PROGRESS NOTES
Lulu Rosales is a 48year old female. Patient presents with:  Pelvic Pain: follow up-discuss test results  Injection: influenza    HPI:   2 months - Reports in the morning feels the pain most intense.  Reports pain in her back and in her stomach - every quittin.1      Smokeless tobacco: Former User    Alcohol use:  Yes      Alcohol/week: 0.0 standard drinks      Comment: socially    Drug use: No       REVIEW OF SYSTEMS:   GENERAL HEALTH: feels well otherwise  SKIN: denies any unusual skin lesions or ra

## 2020-10-08 ENCOUNTER — LAB ENCOUNTER (OUTPATIENT)
Dept: LAB | Facility: HOSPITAL | Age: 55
End: 2020-10-08
Attending: Other
Payer: COMMERCIAL

## 2020-10-08 DIAGNOSIS — R10.13 EPIGASTRIC PAIN: ICD-10-CM

## 2020-10-08 DIAGNOSIS — R14.0 ABDOMINAL BLOATING: ICD-10-CM

## 2020-10-08 DIAGNOSIS — K21.9 GASTROESOPHAGEAL REFLUX DISEASE WITHOUT ESOPHAGITIS: ICD-10-CM

## 2020-10-08 PROCEDURE — 83690 ASSAY OF LIPASE: CPT

## 2020-10-08 PROCEDURE — 87338 HPYLORI STOOL AG IA: CPT

## 2020-10-08 PROCEDURE — 36415 COLL VENOUS BLD VENIPUNCTURE: CPT

## 2020-10-08 PROCEDURE — 85027 COMPLETE CBC AUTOMATED: CPT

## 2020-10-08 PROCEDURE — 80053 COMPREHEN METABOLIC PANEL: CPT

## 2020-10-12 ENCOUNTER — OFFICE VISIT (OUTPATIENT)
Dept: FAMILY MEDICINE CLINIC | Facility: CLINIC | Age: 55
End: 2020-10-12
Payer: COMMERCIAL

## 2020-10-12 VITALS
BODY MASS INDEX: 32.15 KG/M2 | WEIGHT: 193 LBS | HEIGHT: 65 IN | SYSTOLIC BLOOD PRESSURE: 110 MMHG | TEMPERATURE: 98 F | HEART RATE: 86 BPM | DIASTOLIC BLOOD PRESSURE: 74 MMHG

## 2020-10-12 DIAGNOSIS — F50.81 BINGE EATING DISORDER: ICD-10-CM

## 2020-10-12 DIAGNOSIS — R10.13 EPIGASTRIC PAIN: ICD-10-CM

## 2020-10-12 DIAGNOSIS — E55.9 VITAMIN D DEFICIENCY: ICD-10-CM

## 2020-10-12 DIAGNOSIS — Z00.00 ROUTINE MEDICAL EXAM: Primary | ICD-10-CM

## 2020-10-12 PROCEDURE — 90471 IMMUNIZATION ADMIN: CPT | Performed by: FAMILY MEDICINE

## 2020-10-12 PROCEDURE — 90750 HZV VACC RECOMBINANT IM: CPT | Performed by: FAMILY MEDICINE

## 2020-10-12 PROCEDURE — 99396 PREV VISIT EST AGE 40-64: CPT | Performed by: FAMILY MEDICINE

## 2020-10-12 PROCEDURE — 3008F BODY MASS INDEX DOCD: CPT | Performed by: FAMILY MEDICINE

## 2020-10-12 PROCEDURE — 3074F SYST BP LT 130 MM HG: CPT | Performed by: FAMILY MEDICINE

## 2020-10-12 PROCEDURE — 3078F DIAST BP <80 MM HG: CPT | Performed by: FAMILY MEDICINE

## 2020-10-12 NOTE — PROGRESS NOTES
10/12/2020  Heriberto De Anda3    Dear Nikolas Dahl,       Here are your results from your recent visit with Gastroenterology.      Stool testing was negative for an infection in your stomach called helicobacter pylor

## 2020-10-12 NOTE — PROGRESS NOTES
HPI:   Haroon Salomon is a 48year old female who presents for a complete physical exam.    Seeing dr. Richard Vernon for ongoing abdominal pain. Reports still present. Has not been as good about exercise and her diet. Admits can be better with it.      Wt Readi death      Social History:   Social History    Tobacco Use      Smoking status: Former Smoker        Packs/day: 0.10        Years: 8.00        Pack years: .8        Types: Cigarettes        Start date: 7/24/1999        Quit date: 7/24/2012        Years sin PANEL; Future  - ASSAY, THYROID STIM HORMONE; Future  - FREE T4 (FREE THYROXINE); Future  - VITAMIN B12; Future  - VITAMIN D, 25-HYDROXY; Future  - HEMOGLOBIN A1C; Future    2. Vitamin D deficiency      3. Binge eating disorder  Encouraged more exercise.

## 2020-11-07 ENCOUNTER — APPOINTMENT (OUTPATIENT)
Dept: LAB | Age: 55
End: 2020-11-07
Attending: INTERNAL MEDICINE
Payer: COMMERCIAL

## 2020-11-07 ENCOUNTER — LAB ENCOUNTER (OUTPATIENT)
Dept: LAB | Age: 55
End: 2020-11-07
Attending: FAMILY MEDICINE
Payer: COMMERCIAL

## 2020-11-07 DIAGNOSIS — R74.8 ALKALINE PHOSPHATASE ELEVATION: ICD-10-CM

## 2020-11-07 DIAGNOSIS — Z00.00 ROUTINE MEDICAL EXAM: ICD-10-CM

## 2020-11-07 PROCEDURE — 86038 ANTINUCLEAR ANTIBODIES: CPT

## 2020-11-07 PROCEDURE — 83540 ASSAY OF IRON: CPT

## 2020-11-07 PROCEDURE — 82306 VITAMIN D 25 HYDROXY: CPT

## 2020-11-07 PROCEDURE — 83036 HEMOGLOBIN GLYCOSYLATED A1C: CPT

## 2020-11-07 PROCEDURE — 86803 HEPATITIS C AB TEST: CPT

## 2020-11-07 PROCEDURE — 87340 HEPATITIS B SURFACE AG IA: CPT

## 2020-11-07 PROCEDURE — 84466 ASSAY OF TRANSFERRIN: CPT

## 2020-11-07 PROCEDURE — 84443 ASSAY THYROID STIM HORMONE: CPT

## 2020-11-07 PROCEDURE — 86255 FLUORESCENT ANTIBODY SCREEN: CPT

## 2020-11-07 PROCEDURE — 80061 LIPID PANEL: CPT

## 2020-11-07 PROCEDURE — 82728 ASSAY OF FERRITIN: CPT

## 2020-11-07 PROCEDURE — 84439 ASSAY OF FREE THYROXINE: CPT

## 2020-11-07 PROCEDURE — 36415 COLL VENOUS BLD VENIPUNCTURE: CPT

## 2020-11-08 ENCOUNTER — HOSPITAL ENCOUNTER (OUTPATIENT)
Dept: ULTRASOUND IMAGING | Age: 55
Discharge: HOME | End: 2020-11-08
Attending: INTERNAL MEDICINE
Payer: COMMERCIAL

## 2020-11-08 DIAGNOSIS — R74.8 ALKALINE PHOSPHATASE ELEVATION: ICD-10-CM

## 2020-11-08 PROCEDURE — 76705 ECHO EXAM OF ABDOMEN: CPT | Performed by: INTERNAL MEDICINE

## 2020-11-09 NOTE — PROGRESS NOTES
11/9/2020  [place]  Lavern Monk 1942    Dear Tod Zimmer,     Here are the results from your recent testing :    Imaging Orders  Ultrasound showed excess fat in the liver.     Results and Recommendations     The etiology of

## 2020-11-10 RX ORDER — CANAGLIFLOZIN 100 MG/1
100 TABLET, FILM COATED ORAL
Qty: 30 TABLET | Refills: 2 | Status: SHIPPED | OUTPATIENT
Start: 2020-11-10 | End: 2021-02-05

## 2020-11-10 NOTE — PROGRESS NOTES
11/10/2020  Daphne De Anda3    Dear Bonnie Morfin,       Here are your results from your recent visit with Gastroenterology.      Lab tests did not show any other causes for your liver test abnormality other than th

## 2020-11-11 NOTE — PROGRESS NOTES
Rosi Tomas - Based on your labs you do now have diabetes. Your hemoglobin A1C was 6.5 which is diabetes. Good thing is there are great medications now that can help with weight loss and diabetes. I will start you on daily invokana 100 mg in the mornings.  Minor Cummings

## 2020-11-12 RX ORDER — CHOLECALCIFEROL (VITAMIN D3) 1250 MCG
1 CAPSULE ORAL WEEKLY
Qty: 12 CAPSULE | Refills: 4 | Status: SHIPPED | OUTPATIENT
Start: 2020-11-12 | End: 2021-01-27

## 2020-11-12 NOTE — PROGRESS NOTES
Deborah - Your vitamin D levels were very low.  Please start weekly high dose vitamin D 50,000. - Dr. Bj Padilla

## 2020-11-16 ENCOUNTER — PATIENT MESSAGE (OUTPATIENT)
Dept: FAMILY MEDICINE CLINIC | Facility: CLINIC | Age: 55
End: 2020-11-16

## 2020-11-17 NOTE — TELEPHONE ENCOUNTER
From: Robert Marrufo  To: Elly Hartman MD  Sent: 11/16/2020 8:13 PM CST  Subject: Other    Hello Dr. Polly Vogel   I have a rash on the left button side of the head, I send you Two pictures please let me know if you have any problem to see The pictures.   I

## 2020-12-14 ENCOUNTER — TELEPHONE (OUTPATIENT)
Dept: FAMILY MEDICINE CLINIC | Facility: CLINIC | Age: 55
End: 2020-12-14

## 2020-12-14 ENCOUNTER — OFFICE VISIT (OUTPATIENT)
Dept: FAMILY MEDICINE CLINIC | Facility: CLINIC | Age: 55
End: 2020-12-14
Payer: COMMERCIAL

## 2020-12-14 VITALS
TEMPERATURE: 98 F | HEIGHT: 65 IN | WEIGHT: 188 LBS | DIASTOLIC BLOOD PRESSURE: 84 MMHG | SYSTOLIC BLOOD PRESSURE: 142 MMHG | HEART RATE: 101 BPM | BODY MASS INDEX: 31.32 KG/M2

## 2020-12-14 DIAGNOSIS — M54.41 CHRONIC MIDLINE LOW BACK PAIN WITH BILATERAL SCIATICA: Primary | ICD-10-CM

## 2020-12-14 DIAGNOSIS — E11.9 TYPE 2 DIABETES MELLITUS WITHOUT COMPLICATION, WITHOUT LONG-TERM CURRENT USE OF INSULIN (HCC): ICD-10-CM

## 2020-12-14 DIAGNOSIS — G89.29 CHRONIC MIDLINE LOW BACK PAIN WITH BILATERAL SCIATICA: Primary | ICD-10-CM

## 2020-12-14 DIAGNOSIS — L30.9 DERMATITIS: ICD-10-CM

## 2020-12-14 DIAGNOSIS — M54.42 CHRONIC MIDLINE LOW BACK PAIN WITH BILATERAL SCIATICA: Primary | ICD-10-CM

## 2020-12-14 PROCEDURE — 90750 HZV VACC RECOMBINANT IM: CPT | Performed by: FAMILY MEDICINE

## 2020-12-14 PROCEDURE — 3077F SYST BP >= 140 MM HG: CPT | Performed by: FAMILY MEDICINE

## 2020-12-14 PROCEDURE — 3079F DIAST BP 80-89 MM HG: CPT | Performed by: FAMILY MEDICINE

## 2020-12-14 PROCEDURE — 99214 OFFICE O/P EST MOD 30 MIN: CPT | Performed by: FAMILY MEDICINE

## 2020-12-14 PROCEDURE — 90471 IMMUNIZATION ADMIN: CPT | Performed by: FAMILY MEDICINE

## 2020-12-14 PROCEDURE — 3008F BODY MASS INDEX DOCD: CPT | Performed by: FAMILY MEDICINE

## 2020-12-14 RX ORDER — PREDNISONE 20 MG/1
20 TABLET ORAL DAILY
Qty: 10 TABLET | Refills: 0 | Status: SHIPPED | OUTPATIENT
Start: 2020-12-14 | End: 2020-12-15

## 2020-12-14 NOTE — PROGRESS NOTES
Rekha Florence is a 48year old female.  Patient presents with:  Derm Problem: spreading from scalp to neck for 3 weeks, itchy  Back Pain: worse when waking up, affecting ADLs    HPI:   Reports pain in lower back is bad off and on - stops her from exercis status: Former Smoker        Packs/day: 0.10        Years: 8.00        Pack years: .8        Types: Cigarettes        Start date: 1999        Quit date: 2012        Years since quittin.3      Smokeless tobacco: Former User    Alcohol use: Yes

## 2020-12-15 RX ORDER — PREDNISONE 20 MG/1
40 TABLET ORAL DAILY
Qty: 10 TABLET | Refills: 0 | Status: SHIPPED | OUTPATIENT
Start: 2020-12-15 | End: 2020-12-20

## 2020-12-15 NOTE — TELEPHONE ENCOUNTER
•  predniSONE 20 MG Oral Tab, Take 1 tablet (20 mg total) by mouth daily for 5 days. , Disp: 10 tablet, Rfl: 0    Pharmacy comments:  5 or 10 days? Lenny Orr  QTY does not match sig

## 2020-12-17 DIAGNOSIS — M47.816 LUMBAR SPONDYLOSIS: Primary | ICD-10-CM

## 2020-12-17 DIAGNOSIS — M51.9 LUMBAR DISC DISEASE: ICD-10-CM

## 2020-12-17 DIAGNOSIS — G89.29 CHRONIC MIDLINE LOW BACK PAIN WITH BILATERAL SCIATICA: ICD-10-CM

## 2020-12-17 DIAGNOSIS — M54.41 CHRONIC MIDLINE LOW BACK PAIN WITH BILATERAL SCIATICA: ICD-10-CM

## 2020-12-17 DIAGNOSIS — M54.42 CHRONIC MIDLINE LOW BACK PAIN WITH BILATERAL SCIATICA: ICD-10-CM

## 2020-12-17 NOTE — PROGRESS NOTES
[facility] do have significant lower lumbar (back) disc disease. If the medications do not help, let me know and will send you for a MRI. I also want you to do physical therapy to help build up your core muscles.  Referral placed. - Dr. Jo Bourgeois

## 2020-12-29 ENCOUNTER — TELEPHONE (OUTPATIENT)
Dept: FAMILY MEDICINE CLINIC | Facility: CLINIC | Age: 55
End: 2020-12-29

## 2020-12-29 ENCOUNTER — PATIENT MESSAGE (OUTPATIENT)
Dept: FAMILY MEDICINE CLINIC | Facility: CLINIC | Age: 55
End: 2020-12-29

## 2020-12-29 NOTE — TELEPHONE ENCOUNTER
From: Corrina Downey  To: Michell Fletcher MD  Sent: 12/29/2020 10:45 AM CST  Subject: Other    Good morning Dr. Pj Rodriguez  I'm not sure what to do but Today I woke up with a lot of lower back pain I can't move and is to mucho pain. .! Thank You.    Dr. Aletha Boles

## 2020-12-29 NOTE — TELEPHONE ENCOUNTER
Chronic back pain. LOV 12-14-20 started and completed steroids. Started to feel better. Woke up this morning and feels terrible. Pain to left and right lower back, pain radiates to \"ovaries\". Pain worsens when lying down flat (supine or prone). MRI was completed yesterday. no final dictation yet. Please advise.  Thank you

## 2020-12-29 NOTE — TELEPHONE ENCOUNTER
----- Message from Perry Murders sent at 12/29/2020 10:45 AM CST -----  Regarding: Other  Contact: [phone]  Good morning Dr. Audrey Saldaña  I'm not sure what to do but Today I woke up with a lot of lower back pain I can't move and is to mucho pain. .! Thank You.    Dr. Son Daniels I did the MRI

## 2020-12-30 NOTE — TELEPHONE ENCOUNTER
Noted. MRI shows arthritis in her lower back which could be causing all the pain. She needs to talk to Dr. Elsi Rosa for further instruction on how to manage the pain. Might be physical therapy vs see physiatry.

## 2021-01-01 NOTE — PROGRESS NOTES
True Cabrera - There is arthritis within the lower lumbar spine and mild inflammation of the disc.  Please start physical therapy and if not improving, next step would be to see physiatrist for possible epidural injection in the lumbar spine region of your

## 2021-01-11 ENCOUNTER — TELEPHONE (OUTPATIENT)
Dept: FAMILY MEDICINE CLINIC | Facility: CLINIC | Age: 56
End: 2021-01-11

## 2021-01-11 ENCOUNTER — PATIENT MESSAGE (OUTPATIENT)
Dept: FAMILY MEDICINE CLINIC | Facility: CLINIC | Age: 56
End: 2021-01-11

## 2021-01-11 DIAGNOSIS — R21 RASH: Primary | ICD-10-CM

## 2021-01-11 NOTE — TELEPHONE ENCOUNTER
From: Leonel Holter  To: Aime Velez MD  Sent: 1/11/2021 3:10 AM CST  Subject: Visit Follow-up Question    Good morning Dr. Christos Enrique Dr. I Have this rash on la left side of my neck Is growing and is itching   Thank You,   and have a wonderful day

## 2021-01-11 NOTE — TELEPHONE ENCOUNTER
Patient states she was seen by Dr. Rodrigue Gutierres on 12/14 and had a rash at that time. States this rash on the left side of neck is \"growing\" and itching. States the steroid prescribed helped to clear this up last time.     Patient asking if she needs a cream, o

## 2021-01-11 NOTE — TELEPHONE ENCOUNTER
Please triage pt's mychart message below, pic's attached.       Good morning Dr. Ham Tellez Dr. I Have this rash on la left side of my neck Is growing and is itching   Thank You,   and have a wonderful day

## 2021-01-12 RX ORDER — CLOTRIMAZOLE AND BETAMETHASONE DIPROPIONATE 10; .64 MG/G; MG/G
1 CREAM TOPICAL 2 TIMES DAILY PRN
Qty: 45 G | Refills: 1 | Status: SHIPPED | OUTPATIENT
Start: 2021-01-12 | End: 2021-03-26

## 2021-01-12 NOTE — TELEPHONE ENCOUNTER
Advised patient on Dr. Ramya Marx information and recommendations. Advised to call back if needed. Gave her Dermatology phone #. Reviewed medication with her. Patient verbalized understanding.

## 2021-01-14 ENCOUNTER — TELEPHONE (OUTPATIENT)
Dept: PHYSICAL THERAPY | Facility: HOSPITAL | Age: 56
End: 2021-01-14

## 2021-01-14 ENCOUNTER — NURSE TRIAGE (OUTPATIENT)
Dept: OTHER | Age: 56
End: 2021-01-14

## 2021-01-14 RX ORDER — SULFAMETHOXAZOLE AND TRIMETHOPRIM 800; 160 MG/1; MG/1
1 TABLET ORAL 2 TIMES DAILY
Qty: 6 TABLET | Refills: 0 | Status: SHIPPED | OUTPATIENT
Start: 2021-01-14 | End: 2021-08-05

## 2021-01-14 NOTE — TELEPHONE ENCOUNTER
Action Requested: Summary for Provider     []  Critical Lab, Recommendations Needed  [] Need Additional Advice  []   FYI    [x]   Need Orders  [] Need Medications Sent to Pharmacy  []  Other     SUMMARY: Patient c/o urinary symptoms dysuria, frequency, low 6. Monitor self for severe symptoms. If you develop shortness of breath or chest tightness proceed to the emergency room for treatment. If you are awaiting test results notify your provider of the results once they are received.        Reason for call

## 2021-01-16 ENCOUNTER — HOSPITAL ENCOUNTER (OUTPATIENT)
Age: 56
Discharge: HOME | End: 2021-01-16
Attending: EMERGENCY MEDICINE
Payer: COMMERCIAL

## 2021-01-16 ENCOUNTER — NURSE TRIAGE (OUTPATIENT)
Dept: FAMILY MEDICINE CLINIC | Facility: CLINIC | Age: 56
End: 2021-01-16

## 2021-01-16 VITALS
SYSTOLIC BLOOD PRESSURE: 132 MMHG | DIASTOLIC BLOOD PRESSURE: 70 MMHG | HEART RATE: 88 BPM | TEMPERATURE: 97 F | RESPIRATION RATE: 18 BRPM | OXYGEN SATURATION: 97 %

## 2021-01-16 DIAGNOSIS — H00.022 HORDEOLUM INTERNUM OF RIGHT LOWER EYELID: Primary | ICD-10-CM

## 2021-01-16 DIAGNOSIS — J06.9 VIRAL URI: ICD-10-CM

## 2021-01-16 LAB — SARS-COV-2 RNA RESP QL NAA+PROBE: NOT DETECTED

## 2021-01-16 PROCEDURE — 99213 OFFICE O/P EST LOW 20 MIN: CPT

## 2021-01-16 RX ORDER — OFLOXACIN 3 MG/ML
2 SOLUTION/ DROPS OPHTHALMIC
Qty: 5 ML | Refills: 0 | Status: SHIPPED | OUTPATIENT
Start: 2021-01-16 | End: 2021-01-20

## 2021-01-16 NOTE — ED PROVIDER NOTES
Patient Seen in: [facility]      History   Patient presents with:  Eye Problem    Stated Complaint: swollen eyes    HPI/Subjective:   HPI    Patient is a 67-year-old female with no significant past medical history who presents now with bilate 0.10        Years: 8.00        Pack years: .8        Types: Cigarettes        Start date: 1999        Quit date: 2012        Years since quittin.4      Smokeless tobacco: Former User    Alcohol use:  Yes      Alcohol/week: 0.0 standard drinks eyedrops and recommend continuing compresses. Will provide with ophthalmologic follow-up. The patient has a Covid pending. However, she requested rapid Covid testing here as it is available. Patient was made aware of her negative rapid Covid.     MDM

## 2021-01-16 NOTE — TELEPHONE ENCOUNTER
Pt returned call, stts she has been using compresses, stts will go to urgent care today, temp 99.9, pt stts she is awaiting results for covid test taken at an outside facility

## 2021-01-16 NOTE — TELEPHONE ENCOUNTER
I do not see any notes about it. Did they give her antibiotics, what was treatment? Is she applying hot compresses.

## 2021-01-16 NOTE — TELEPHONE ENCOUNTER
Action Requested: Summary for Provider     []  Critical Lab, Recommendations Needed  [] Need Additional Advice  []   FYI    []   Need Orders  [] Need Medications Sent to Pharmacy  []  Other     SUMMARY:sent to [facility]- s/s noticed Wednesday , bilateral eye li

## 2021-01-16 NOTE — ED INITIAL ASSESSMENT (HPI)
Reports swelling to bilateral eyes since Thursday. States she noted bumps to bilateral upper and lower eyelids. Denies contact lens use. Reports mild tearing at nighttime.

## 2021-01-18 ENCOUNTER — APPOINTMENT (OUTPATIENT)
Dept: PHYSICAL THERAPY | Age: 56
End: 2021-01-18
Attending: FAMILY MEDICINE
Payer: COMMERCIAL

## 2021-01-18 NOTE — TELEPHONE ENCOUNTER
Pt was seen at [facility] on 1/16/21.  Covid rapid was negative    Clinical Impression:  Hordeolum internum of right lower eyelid  (primary encounter diagnosis)  Viral URI     Disposition:  Discharge  1/16/2021 10:38 am     Follow-up:  Davidson Marcus MD

## 2021-01-20 ENCOUNTER — OFFICE VISIT (OUTPATIENT)
Dept: OPHTHALMOLOGY | Facility: CLINIC | Age: 56
End: 2021-01-20
Payer: COMMERCIAL

## 2021-01-20 DIAGNOSIS — H00.12 CHALAZION RIGHT LOWER EYELID: ICD-10-CM

## 2021-01-20 DIAGNOSIS — H00.14 CHALAZION LEFT UPPER EYELID: Primary | ICD-10-CM

## 2021-01-20 PROCEDURE — 92002 INTRM OPH EXAM NEW PATIENT: CPT | Performed by: OPHTHALMOLOGY

## 2021-01-20 NOTE — PROGRESS NOTES
Get Yadav is a 48year old female. HPI:     HPI     Pt in today for a RLL hordeolum follow-up from Urgent Care.  Pt states having \"bumps\" in both eyes (RLL and OANH) for over a week and was seen at Urgent Care on 1/16/20 due to \"bumps\" not dimi tobacco: Former User    Alcohol use:  Yes      Alcohol/week: 0.0 standard drinks      Comment: socially    Drug use: No      Medications:  Current Outpatient Medications   Medication Sig Dispense Refill   • Sulfamethoxazole-TMP -160 MG Oral Tab per ta nasal 1/3     Conjunctiva/Sclera Non-injected Non-injected    Cornea Clear Clear    Anterior Chamber Deep and quiet Deep and quiet    Iris Normal Normal            Refraction     Wearing Rx       Sphere Cylinder Axis Add    Right +1.50 +0.50 015 +2.25    L instructions:  Return for 1-2 months for recheck of chalazia OU and diabetic eye exam .    1/20/2021  Scribed by: Keila Hazel MD

## 2021-01-20 NOTE — PATIENT INSTRUCTIONS
Chalazion left upper eyelid  Recommend aggressive warm compresses; patient should use them 20-30 times per day. Information on chalazia given. Told patient that it is not big enough to excise at this time.   Discussed with patient that since is still i A chalazion often lasts from a few weeks to a month. It often goes away on its own. A chalazion can be mistaken for a sty (infection of an oil gland) because they both appear on the eyelid.   Why a chalazion forms  It’s often unclear why a chalazion appears Call your healthcare provider right away if any of these occur:  · Ny Ache returns to the same area repeatedly  · Existing symptoms (such as pain, warmth, redness, and drainage) get worse  · New symptoms appear, such as eye pain, warmth or redness around

## 2021-01-20 NOTE — ASSESSMENT & PLAN NOTE
Recommend aggressive warm compresses; patient should use them 40-50 times per day. Information on chalazia given. Told patient that it is not big enough to excise at this time.   Discussed with patient that since is still in the early stages, it may be a

## 2021-01-21 ENCOUNTER — OFFICE VISIT (OUTPATIENT)
Dept: PHYSICAL THERAPY | Age: 56
End: 2021-01-21
Attending: FAMILY MEDICINE
Payer: COMMERCIAL

## 2021-01-21 DIAGNOSIS — M54.42 CHRONIC MIDLINE LOW BACK PAIN WITH BILATERAL SCIATICA: ICD-10-CM

## 2021-01-21 DIAGNOSIS — M47.816 LUMBAR SPONDYLOSIS: ICD-10-CM

## 2021-01-21 DIAGNOSIS — M54.41 CHRONIC MIDLINE LOW BACK PAIN WITH BILATERAL SCIATICA: ICD-10-CM

## 2021-01-21 DIAGNOSIS — M51.9 LUMBAR DISC DISEASE: ICD-10-CM

## 2021-01-21 DIAGNOSIS — G89.29 CHRONIC MIDLINE LOW BACK PAIN WITH BILATERAL SCIATICA: ICD-10-CM

## 2021-01-21 PROCEDURE — 97110 THERAPEUTIC EXERCISES: CPT

## 2021-01-21 PROCEDURE — 97162 PT EVAL MOD COMPLEX 30 MIN: CPT

## 2021-01-21 NOTE — PROGRESS NOTES
PHYSICAL THERAPY EVALUATION:   Referring Physician: Dr. Corinna Carolina  Diagnosis:         Expected by:    Associated DX:  Lumbar spondylosis (M47.816)  Chronic midline low back pain with bilateral sciatica (M54.41,M54.42,G89.29)  Lumbar disc disease (M51.9)    narrowing at L4-L5. No evidence of focal disc herniation or spinal stenosis throughout the lumbar spine      Richa Kia describes prior level of function Pt was painfree and was trying to be more active .  She was able to work with no issues    Occupation: force       The results of the objective tests and measures as noted below show  impairments of  joint mobility, motor function, muscle performance, muscle endurance, range of motion, coordination, balance and gait. Pt and PT discussed evaluation findings, overcorrect:NT  Pronelying:better  YAJAIRA:better  Repeated motions:  BEN:NT  RFIS:NT  SKTC:painful  EIL:midrange: 10 x 1 increased pain      right left Comments    Motor Control Motor Control    Double Leg Squat Decreased Decreased    Single Leg Squat Other: dress self with ease  3. Pt will improve strength on BLEgraded below 5/5 to at least half a grade or better to be able safely return to PLOF and improve community mobility  4.  Pt will report overall decreased in pain and symptoms and functional limitations

## 2021-01-25 ENCOUNTER — OFFICE VISIT (OUTPATIENT)
Dept: PHYSICAL THERAPY | Age: 56
End: 2021-01-25
Attending: FAMILY MEDICINE
Payer: COMMERCIAL

## 2021-01-25 ENCOUNTER — OFFICE VISIT (OUTPATIENT)
Dept: OBGYN CLINIC | Facility: CLINIC | Age: 56
End: 2021-01-25
Payer: COMMERCIAL

## 2021-01-25 VITALS
HEIGHT: 65 IN | DIASTOLIC BLOOD PRESSURE: 80 MMHG | SYSTOLIC BLOOD PRESSURE: 124 MMHG | BODY MASS INDEX: 31.82 KG/M2 | WEIGHT: 191 LBS

## 2021-01-25 DIAGNOSIS — Z12.31 ENCOUNTER FOR SCREENING MAMMOGRAM FOR BREAST CANCER: ICD-10-CM

## 2021-01-25 DIAGNOSIS — N89.8 VAGINAL DISCHARGE: ICD-10-CM

## 2021-01-25 DIAGNOSIS — Z01.419 ENCOUNTER FOR ANNUAL ROUTINE GYNECOLOGICAL EXAMINATION: Primary | ICD-10-CM

## 2021-01-25 PROBLEM — E11.9 TYPE 2 DIABETES MELLITUS WITHOUT COMPLICATION, WITHOUT LONG-TERM CURRENT USE OF INSULIN (HCC): Status: ACTIVE | Noted: 2021-01-25

## 2021-01-25 PROCEDURE — 3008F BODY MASS INDEX DOCD: CPT | Performed by: ADVANCED PRACTICE MIDWIFE

## 2021-01-25 PROCEDURE — 99396 PREV VISIT EST AGE 40-64: CPT | Performed by: ADVANCED PRACTICE MIDWIFE

## 2021-01-25 PROCEDURE — 3074F SYST BP LT 130 MM HG: CPT | Performed by: ADVANCED PRACTICE MIDWIFE

## 2021-01-25 PROCEDURE — 97140 MANUAL THERAPY 1/> REGIONS: CPT

## 2021-01-25 PROCEDURE — 3079F DIAST BP 80-89 MM HG: CPT | Performed by: ADVANCED PRACTICE MIDWIFE

## 2021-01-25 PROCEDURE — 97110 THERAPEUTIC EXERCISES: CPT

## 2021-01-25 NOTE — PROGRESS NOTES
HPI:    Patient ID: Jorje Damon is a 48year old female. Here for annual exam.  Has not had a pap in several years. She had a hysterectomy for precancerous changes on her cervix. Has a vaginal odor.   Saw Dr. Cristiana Ramirez for incontinence and is doing 45 g 1   • Lisdexamfetamine Dimesylate 20 MG Oral Cap Take 1 capsule (20 mg total) by mouth every morning. 30 capsule 0   • Cholecalciferol (VITAMIN D3) 1.25 MG (05003 UT) Oral Cap Take 1 tablet by mouth once a week.  12 capsule 4   • Canagliflozin [facility] Onset   • Diabetes Father    • Cancer Father         Lung CA   • Cancer Mother         Uterus Cancer; age 36 cause of death      Social History:   Social History    Tobacco Use      Smoking status: Former Smoker        Packs/day: 0.10        Years: 8.00 atraumatic. Right Ear: External ear normal.   Left Ear: External ear normal.   Nose: Nose normal.   Eyes: Pupils are equal, round, and reactive to light. Conjunctivae and EOM are normal. Right eye exhibits no discharge. Left eye exhibits no discharge.  No screening mammogram,   2. Recommend Vitamin D 3 2000 IU daily  3. Labs:  Pap, Vaginal culture  4. Aric/Mammogram  5. RTC 1 year or prn    No orders of the defined types were placed in this encounter.       Meds This Visit:  Requested Prescriptions

## 2021-01-25 NOTE — PROGRESS NOTES
Dx:  Parag Pyo spondylosis (M47.816)  Chronic midline low back pain with bilateral sciatica (M54.41,M54.42,G89.29)  Lumbar disc disease (M51.9)        Insurance   PPO         POC# of visits: 8-0          Authorized  # visits by insurance  :  8-10  Expiration 5/  FOTO Date:    Tx#: 6/   Theraex:  NU step level 5 x6  Mins  Pronelying x1  Min  YAJAIRA  PROM In all planes incluing ER/IR at 90 hip flexion  NWB lumbar rotation single leg at a time   TRA with marches 2x10  TrA with  Knee fallout 2x10  TrA with hip add wit

## 2021-01-27 LAB
GENITAL VAGINOSIS SCREEN: NEGATIVE
TRICHOMONAS SCREEN: NEGATIVE

## 2021-01-27 RX ORDER — CHOLECALCIFEROL (VITAMIN D3) 1250 MCG
1 CAPSULE ORAL WEEKLY
Qty: 12 CAPSULE | Refills: 4 | Status: SHIPPED | OUTPATIENT
Start: 2021-01-27

## 2021-02-01 ENCOUNTER — APPOINTMENT (OUTPATIENT)
Dept: PHYSICAL THERAPY | Age: 56
End: 2021-02-01
Attending: FAMILY MEDICINE
Payer: COMMERCIAL

## 2021-02-03 ENCOUNTER — TELEPHONE (OUTPATIENT)
Dept: PHYSICAL THERAPY | Facility: HOSPITAL | Age: 56
End: 2021-02-03

## 2021-02-04 ENCOUNTER — APPOINTMENT (OUTPATIENT)
Dept: PHYSICAL THERAPY | Age: 56
End: 2021-02-04
Attending: FAMILY MEDICINE
Payer: COMMERCIAL

## 2021-02-04 ENCOUNTER — OFFICE VISIT (OUTPATIENT)
Dept: OPHTHALMOLOGY | Facility: CLINIC | Age: 56
End: 2021-02-04
Payer: COMMERCIAL

## 2021-02-04 DIAGNOSIS — H43.391 FLOATERS, RIGHT: ICD-10-CM

## 2021-02-04 DIAGNOSIS — H00.12 CHALAZION RIGHT LOWER EYELID: ICD-10-CM

## 2021-02-04 DIAGNOSIS — H25.13 AGE-RELATED NUCLEAR CATARACT OF BOTH EYES: ICD-10-CM

## 2021-02-04 DIAGNOSIS — H02.883 MEIBOMIAN GLAND DYSFUNCTION (MGD) OF BOTH EYES: ICD-10-CM

## 2021-02-04 DIAGNOSIS — H00.14 CHALAZION LEFT UPPER EYELID: ICD-10-CM

## 2021-02-04 DIAGNOSIS — H00.15 CHALAZION LEFT LOWER EYELID: ICD-10-CM

## 2021-02-04 DIAGNOSIS — E11.9 TYPE 2 DIABETES MELLITUS WITHOUT RETINOPATHY (HCC): Primary | ICD-10-CM

## 2021-02-04 DIAGNOSIS — H02.886 MEIBOMIAN GLAND DYSFUNCTION (MGD) OF BOTH EYES: ICD-10-CM

## 2021-02-04 PROCEDURE — 99243 OFF/OP CNSLTJ NEW/EST LOW 30: CPT | Performed by: OPHTHALMOLOGY

## 2021-02-04 NOTE — PROGRESS NOTES
Lulu Rosales is a 48year old female.     HPI:     HPI     Consult      Additional comments: Per Dr. Pandey Sites               Diabetic Eye Exam      Additional comments: Pt has been a diabetic for 4 months        Pt's diabetes is currently controlled by pills 2012        Years since quittin.5      Smokeless tobacco: Former User    Alcohol use:  Yes      Alcohol/week: 0.0 standard drinks      Comment: socially    Drug use: No      Medications:  Current Outpatient Medications   Medication Sig Dispense Ref Right PERRL    Left PERRL          Visual Fields       Left Right     Full Full          Extraocular Movement       Right Left     Full, Ortho Full, Ortho          Dilation     Both eyes: 1.0% Mydriacyl and 2.5% Yovany Synephrine @ 3:47 PM            Slit Bae to resolve on its own without excision. Will have patient call the office if chalazion worsens or does not resolve.       Age-related nuclear cataract of both eyes  Discussed mild cataracts in both eyes that are not affecting vision and are not surgical at

## 2021-02-04 NOTE — ASSESSMENT & PLAN NOTE
Continue aggressive warm compresses.
Continue aggressive warm compresses.
Diabetes type II: no background of retinopathy, no signs of neovascularization noted. Discussed ocular and systemic benefits of blood sugar control. Diagnosis and treatment discussed in detail with patient.
Discussed mild cataracts in both eyes that are not affecting vision and are not surgical at this time.
No treatment.
Patient was instructed to use warm compresses to the eyelids twice a day everyday to prevent future chalazia. Instructions for warm compress use:   Patient should place wash compresses on both eyelids for 5 minutes every morning and every night.   After 5
Recommend aggressive warm compresses; patient should use them 20-30 times per day. Information on chalazia given. Told patient that it is not big enough to excise at this time.   Discussed with patient that since is still in the early stages, it may be a
no

## 2021-02-04 NOTE — PATIENT INSTRUCTIONS
Type 2 diabetes mellitus without retinopathy ([facility] [address]  Diabetes type II: no background of retinopathy, no signs of neovascularization noted. Discussed ocular and systemic benefits of blood sugar control.   Diagnosis and treatment discussed in detail with dameon

## 2021-02-05 RX ORDER — CANAGLIFLOZIN 100 MG/1
TABLET, FILM COATED ORAL
Qty: 30 TABLET | Refills: 2 | Status: SHIPPED | OUTPATIENT
Start: 2021-02-05 | End: 2021-05-09

## 2021-02-08 ENCOUNTER — APPOINTMENT (OUTPATIENT)
Dept: PHYSICAL THERAPY | Age: 56
End: 2021-02-08
Attending: FAMILY MEDICINE
Payer: COMMERCIAL

## 2021-02-08 ENCOUNTER — TELEPHONE (OUTPATIENT)
Dept: PHYSICAL THERAPY | Facility: HOSPITAL | Age: 56
End: 2021-02-08

## 2021-02-10 NOTE — PROGRESS NOTES
Normal mammogram. Plan for repeat in 1 year. - Dr. Kristofer Fernández Intermediate Repair Preamble Text (Leave Blank If You Do Not Want): Wide undermining was performed with blunt dissection.

## 2021-02-11 ENCOUNTER — APPOINTMENT (OUTPATIENT)
Dept: PHYSICAL THERAPY | Age: 56
End: 2021-02-11
Attending: FAMILY MEDICINE
Payer: COMMERCIAL

## 2021-02-13 ENCOUNTER — LAB ENCOUNTER (OUTPATIENT)
Dept: LAB | Age: 56
End: 2021-02-13
Attending: FAMILY MEDICINE
Payer: COMMERCIAL

## 2021-02-13 DIAGNOSIS — Z00.00 ROUTINE MEDICAL EXAM: ICD-10-CM

## 2021-02-13 DIAGNOSIS — E11.9 TYPE 2 DIABETES MELLITUS WITHOUT COMPLICATION, WITHOUT LONG-TERM CURRENT USE OF INSULIN (HCC): ICD-10-CM

## 2021-02-13 LAB
CREAT UR-SCNC: 88.2 MG/DL
EST. AVERAGE GLUCOSE BLD GHB EST-MCNC: 131 MG/DL (ref 68–126)
HBA1C MFR BLD HPLC: 6.2 % (ref ?–5.7)
MICROALBUMIN UR-MCNC: 1.22 MG/DL
MICROALBUMIN/CREAT 24H UR-RTO: 13.8 UG/MG (ref ?–30)
VIT B12 SERPL-MCNC: 322 PG/ML (ref 193–986)

## 2021-02-13 PROCEDURE — 82043 UR ALBUMIN QUANTITATIVE: CPT

## 2021-02-13 PROCEDURE — 83036 HEMOGLOBIN GLYCOSYLATED A1C: CPT

## 2021-02-13 PROCEDURE — 36415 COLL VENOUS BLD VENIPUNCTURE: CPT

## 2021-02-13 PROCEDURE — 82570 ASSAY OF URINE CREATININE: CPT

## 2021-02-13 PROCEDURE — 82607 VITAMIN B-12: CPT

## 2021-02-13 PROCEDURE — 3061F NEG MICROALBUMINURIA REV: CPT | Performed by: NURSE PRACTITIONER

## 2021-02-13 PROCEDURE — 3044F HG A1C LEVEL LT 7.0%: CPT | Performed by: NURSE PRACTITIONER

## 2021-02-15 ENCOUNTER — APPOINTMENT (OUTPATIENT)
Dept: PHYSICAL THERAPY | Age: 56
End: 2021-02-15
Attending: FAMILY MEDICINE
Payer: COMMERCIAL

## 2021-02-17 NOTE — PROGRESS NOTES
[address] glucose is improving. Diabetes is improving.  Continue current treatment. - Dr. Corinna Carolina

## 2021-02-18 ENCOUNTER — OFFICE VISIT (OUTPATIENT)
Dept: PHYSICAL THERAPY | Age: 56
End: 2021-02-18
Attending: FAMILY MEDICINE
Payer: COMMERCIAL

## 2021-02-18 PROCEDURE — 97110 THERAPEUTIC EXERCISES: CPT

## 2021-02-18 NOTE — PROGRESS NOTES
Dx:  Rufino Jude spondylosis (M47.816)  Chronic midline low back pain with bilateral sciatica (M54.41,M54.42,G89.29)  Lumbar disc disease (M51.9)        Insurance   PPO         POC# of visits: 8-0          Authorized  # visits by insurance  :  8-10  Expiration community mobility  4. Pt will report overall decreased in pain and symptoms and functional limitations  by 50% or better to be able to PLOF  5.  Pt will increase SLS on BLE for improved static/dynamic balance at least 5-10 secs better than initial findings

## 2021-02-22 ENCOUNTER — APPOINTMENT (OUTPATIENT)
Dept: PHYSICAL THERAPY | Age: 56
End: 2021-02-22
Attending: FAMILY MEDICINE
Payer: COMMERCIAL

## 2021-02-25 ENCOUNTER — HOSPITAL ENCOUNTER (OUTPATIENT)
Dept: MAMMOGRAPHY | Age: 56
Discharge: HOME | End: 2021-02-25
Attending: ADVANCED PRACTICE MIDWIFE
Payer: COMMERCIAL

## 2021-02-25 DIAGNOSIS — Z12.31 ENCOUNTER FOR SCREENING MAMMOGRAM FOR BREAST CANCER: ICD-10-CM

## 2021-02-25 PROCEDURE — 77067 SCR MAMMO BI INCL CAD: CPT | Performed by: ADVANCED PRACTICE MIDWIFE

## 2021-02-25 PROCEDURE — 77063 BREAST TOMOSYNTHESIS BI: CPT | Performed by: ADVANCED PRACTICE MIDWIFE

## 2021-03-01 ENCOUNTER — APPOINTMENT (OUTPATIENT)
Dept: PHYSICAL THERAPY | Age: 56
End: 2021-03-01
Attending: FAMILY MEDICINE
Payer: COMMERCIAL

## 2021-03-02 ENCOUNTER — TELEPHONE (OUTPATIENT)
Dept: PHYSICAL THERAPY | Facility: HOSPITAL | Age: 56
End: 2021-03-02

## 2021-03-24 ENCOUNTER — HOSPITAL ENCOUNTER (OUTPATIENT)
Age: 56
Discharge: HOME | End: 2021-03-24
Attending: EMERGENCY MEDICINE
Payer: COMMERCIAL

## 2021-03-24 VITALS
DIASTOLIC BLOOD PRESSURE: 70 MMHG | TEMPERATURE: 97 F | OXYGEN SATURATION: 97 % | HEART RATE: 84 BPM | RESPIRATION RATE: 18 BRPM | SYSTOLIC BLOOD PRESSURE: 138 MMHG

## 2021-03-24 DIAGNOSIS — Z20.822 ENCOUNTER FOR LABORATORY TESTING FOR COVID-19 VIRUS: Primary | ICD-10-CM

## 2021-03-24 DIAGNOSIS — Z20.822 LAB TEST NEGATIVE FOR COVID-19 VIRUS: ICD-10-CM

## 2021-03-24 LAB — SARS-COV-2 RNA RESP QL NAA+PROBE: NOT DETECTED

## 2021-03-24 PROCEDURE — 99212 OFFICE O/P EST SF 10 MIN: CPT

## 2021-03-24 NOTE — ED INITIAL ASSESSMENT (HPI)
Recently traveled to [facility]. States she needs a covid test to return to work. Patient is asymptomatic.

## 2021-03-24 NOTE — ED PROVIDER NOTES
Patient Seen in: [facility]      History   Patient presents with:  Covid-19 Test    Stated Complaint: Covid test    HPI/Subjective:   HPI    48year old female presents for evaluation after traveling to [facility] and needs a return to work covid Alcohol use: Yes      Alcohol/week: 0.0 standard drinks      Comment: socially    Drug use: No             Review of Systems   Constitutional: Negative. HENT: Negative. Eyes: Negative. Respiratory: Negative. Cardiovascular: Negative.     Derrel Boxer SARS-COV-2 BY PCR                   MDM    COVID test is negative. She is asymptomatic and vaccinated. She is cleared to return to work. Imaging:   No results found. I personally reviewed all radiology images.     Medical Record Review: I personally

## 2021-03-26 RX ORDER — CLOTRIMAZOLE AND BETAMETHASONE DIPROPIONATE 10; .64 MG/G; MG/G
1 CREAM TOPICAL 2 TIMES DAILY PRN
Qty: 45 G | Refills: 1 | Status: ON HOLD | OUTPATIENT
Start: 2021-03-26 | End: 2021-09-28

## 2021-05-09 RX ORDER — CANAGLIFLOZIN 100 MG/1
100 TABLET, FILM COATED ORAL
Qty: 90 TABLET | Refills: 2 | Status: SHIPPED | OUTPATIENT
Start: 2021-05-09 | End: 2021-11-26

## 2021-08-13 ENCOUNTER — OFFICE VISIT (OUTPATIENT)
Dept: UROLOGY | Facility: HOSPITAL | Age: 56
End: 2021-08-13
Attending: OBSTETRICS & GYNECOLOGY
Payer: COMMERCIAL

## 2021-08-13 VITALS — RESPIRATION RATE: 18 BRPM | WEIGHT: 199 LBS | BODY MASS INDEX: 33.15 KG/M2 | HEIGHT: 65 IN

## 2021-08-13 DIAGNOSIS — N81.84 PELVIC MUSCLE WASTING: ICD-10-CM

## 2021-08-13 DIAGNOSIS — Z98.890 POST-OPERATIVE STATE: ICD-10-CM

## 2021-08-13 DIAGNOSIS — N95.2 POSTMENOPAUSAL ATROPHIC VAGINITIS: Primary | ICD-10-CM

## 2021-08-13 PROCEDURE — 99212 OFFICE O/P EST SF 10 MIN: CPT

## 2021-08-13 NOTE — PROGRESS NOTES
She is s/p Post-Op Summary  Procedure Date: 04/18/19  Procedure Name: (S) Mid-urethral Sling;Posterior Repair;Cystoscopy; Other (Please specify in comments) (excision of vaginal scar)    Doing well   no complaints  Voids freely  No UTIs  BMs reg  She c/o so

## 2021-08-24 ENCOUNTER — HOSPITAL ENCOUNTER (OUTPATIENT)
Age: 56
Discharge: HOME | End: 2021-08-24
Payer: COMMERCIAL

## 2021-08-24 VITALS
OXYGEN SATURATION: 97 % | TEMPERATURE: 97 F | SYSTOLIC BLOOD PRESSURE: 132 MMHG | RESPIRATION RATE: 20 BRPM | HEART RATE: 82 BPM | DIASTOLIC BLOOD PRESSURE: 72 MMHG

## 2021-08-24 DIAGNOSIS — J02.0 STREPTOCOCCAL SORE THROAT: Primary | ICD-10-CM

## 2021-08-24 LAB
S PYO AG THROAT QL: POSITIVE
SARS-COV-2 RNA RESP QL NAA+PROBE: NOT DETECTED

## 2021-08-24 PROCEDURE — 87880 STREP A ASSAY W/OPTIC: CPT

## 2021-08-24 PROCEDURE — 99213 OFFICE O/P EST LOW 20 MIN: CPT

## 2021-08-24 RX ORDER — FLUTICASONE PROPIONATE 50 MCG
2 SPRAY, SUSPENSION (ML) NASAL DAILY
Qty: 16 G | Refills: 0 | Status: SHIPPED | OUTPATIENT
Start: 2021-08-24 | End: 2021-09-23

## 2021-08-24 RX ORDER — DEXTROMETHORPHAN HYDROBROMIDE AND PROMETHAZINE HYDROCHLORIDE 15; 6.25 MG/5ML; MG/5ML
5 SYRUP ORAL 4 TIMES DAILY PRN
Qty: 118 ML | Refills: 0 | Status: ON HOLD | OUTPATIENT
Start: 2021-08-24 | End: 2021-09-28

## 2021-08-24 RX ORDER — AMOXICILLIN 875 MG/1
875 TABLET, COATED ORAL 2 TIMES DAILY
Qty: 20 TABLET | Refills: 0 | Status: SHIPPED | OUTPATIENT
Start: 2021-08-24 | End: 2021-09-03

## 2021-08-24 NOTE — ED PROVIDER NOTES
Patient Seen in: [facility]      History   Patient presents with:  Sore Throat    Stated Complaint: headache,sore throat,ear pain    HPI/Subjective:   Haroon Salomon is a 55year-old female of bilateral ear pain, sinus pressure, congestion, Yes      Alcohol/week: 0.0 standard drinks      Comment: socially    Drug use: No             Review of Systems   Constitutional: Negative. HENT: Positive for congestion, sinus pressure, sinus pain and sore throat. Eyes: Negative.     Respiratory: Pos exudate or uvula swelling. Tonsils: No tonsillar exudate or tonsillar abscesses. 1+ on the right. 1+ on the left. Eyes:      Extraocular Movements: Extraocular movements intact.       Conjunctiva/sclera: Conjunctivae normal.      Pupils: Pupils are e Time: 08/24/21 11:14 AM   Result Value Ref Range    POCT Rapid Strep Positive (A) Negative         MDM     COVID negative. RST positive. RX Amoxicillin, symptom management. Discharge home. Supportive care.      Patient is afebrile, nontoxic in appearance wi

## 2021-09-28 ENCOUNTER — OFFICE VISIT (OUTPATIENT)
Dept: FAMILY MEDICINE CLINIC | Facility: CLINIC | Age: 56
End: 2021-09-28
Payer: COMMERCIAL

## 2021-09-28 ENCOUNTER — NURSE TRIAGE (OUTPATIENT)
Dept: FAMILY MEDICINE CLINIC | Facility: CLINIC | Age: 56
End: 2021-09-28

## 2021-09-28 ENCOUNTER — HOSPITAL ENCOUNTER (OUTPATIENT)
Facility: HOSPITAL | Age: 56
Setting detail: OBSERVATION
Discharge: HOME | End: 2021-09-29
Attending: EMERGENCY MEDICINE | Admitting: HOSPITALIST
Payer: COMMERCIAL

## 2021-09-28 ENCOUNTER — APPOINTMENT (OUTPATIENT)
Dept: GENERAL RADIOLOGY | Facility: HOSPITAL | Age: 56
End: 2021-09-28
Attending: EMERGENCY MEDICINE
Payer: COMMERCIAL

## 2021-09-28 VITALS
WEIGHT: 199 LBS | SYSTOLIC BLOOD PRESSURE: 120 MMHG | HEART RATE: 89 BPM | BODY MASS INDEX: 33.15 KG/M2 | DIASTOLIC BLOOD PRESSURE: 73 MMHG | TEMPERATURE: 98 F | HEIGHT: 65 IN

## 2021-09-28 DIAGNOSIS — R07.9 CHEST PAIN OF UNCERTAIN ETIOLOGY: Primary | ICD-10-CM

## 2021-09-28 DIAGNOSIS — R07.9 CHEST PAIN, UNSPECIFIED TYPE: Primary | ICD-10-CM

## 2021-09-28 PROCEDURE — 99213 OFFICE O/P EST LOW 20 MIN: CPT | Performed by: NURSE PRACTITIONER

## 2021-09-28 PROCEDURE — 3008F BODY MASS INDEX DOCD: CPT | Performed by: NURSE PRACTITIONER

## 2021-09-28 PROCEDURE — 3074F SYST BP LT 130 MM HG: CPT | Performed by: NURSE PRACTITIONER

## 2021-09-28 PROCEDURE — 3044F HG A1C LEVEL LT 7.0%: CPT | Performed by: FAMILY MEDICINE

## 2021-09-28 PROCEDURE — 99220 INITIAL OBSERVATION CARE,LEVL III: CPT | Performed by: HOSPITALIST

## 2021-09-28 PROCEDURE — 71045 X-RAY EXAM CHEST 1 VIEW: CPT | Performed by: EMERGENCY MEDICINE

## 2021-09-28 PROCEDURE — 3078F DIAST BP <80 MM HG: CPT | Performed by: NURSE PRACTITIONER

## 2021-09-28 RX ORDER — METOPROLOL TARTRATE 100 MG/1
100 TABLET ORAL ONCE
Status: COMPLETED | OUTPATIENT
Start: 2021-09-28 | End: 2021-09-28

## 2021-09-28 RX ORDER — METOPROLOL TARTRATE 100 MG/1
100 TABLET ORAL ONCE AS NEEDED
Status: COMPLETED | OUTPATIENT
Start: 2021-09-28 | End: 2021-09-29

## 2021-09-28 RX ORDER — ACETAMINOPHEN 325 MG/1
650 TABLET ORAL EVERY 4 HOURS PRN
Status: DISCONTINUED | OUTPATIENT
Start: 2021-09-28 | End: 2021-09-29

## 2021-09-28 RX ORDER — NITROGLYCERIN 0.4 MG/1
0.4 TABLET SUBLINGUAL EVERY 5 MIN PRN
Status: DISCONTINUED | OUTPATIENT
Start: 2021-09-28 | End: 2021-09-29

## 2021-09-28 RX ORDER — HEPARIN SODIUM 5000 [USP'U]/ML
5000 INJECTION, SOLUTION INTRAVENOUS; SUBCUTANEOUS EVERY 12 HOURS SCHEDULED
Status: DISCONTINUED | OUTPATIENT
Start: 2021-09-28 | End: 2021-09-29

## 2021-09-28 RX ORDER — ACETAMINOPHEN 325 MG/1
650 TABLET ORAL EVERY 6 HOURS PRN
Status: DISCONTINUED | OUTPATIENT
Start: 2021-09-28 | End: 2021-09-29

## 2021-09-28 RX ORDER — ONDANSETRON 2 MG/ML
4 INJECTION INTRAMUSCULAR; INTRAVENOUS EVERY 6 HOURS PRN
Status: DISCONTINUED | OUTPATIENT
Start: 2021-09-28 | End: 2021-09-29

## 2021-09-28 RX ORDER — METOPROLOL TARTRATE 50 MG/1
50 TABLET, FILM COATED ORAL ONCE AS NEEDED
Status: ACTIVE | OUTPATIENT
Start: 2021-09-28 | End: 2021-09-28

## 2021-09-28 RX ORDER — SODIUM CHLORIDE 9 MG/ML
INJECTION, SOLUTION INTRAVENOUS CONTINUOUS
Status: DISCONTINUED | OUTPATIENT
Start: 2021-09-28 | End: 2021-09-29

## 2021-09-28 RX ORDER — PANTOPRAZOLE SODIUM 40 MG/1
40 TABLET, DELAYED RELEASE ORAL
Status: DISCONTINUED | OUTPATIENT
Start: 2021-09-29 | End: 2021-09-29

## 2021-09-28 RX ORDER — METOPROLOL TARTRATE 100 MG/1
100 TABLET ORAL ONCE AS NEEDED
Status: ACTIVE | OUTPATIENT
Start: 2021-09-28 | End: 2021-09-28

## 2021-09-28 RX ORDER — PROCHLORPERAZINE EDISYLATE 5 MG/ML
5 INJECTION INTRAMUSCULAR; INTRAVENOUS EVERY 8 HOURS PRN
Status: DISCONTINUED | OUTPATIENT
Start: 2021-09-28 | End: 2021-09-29

## 2021-09-28 RX ORDER — METOPROLOL TARTRATE 5 MG/5ML
5 INJECTION INTRAVENOUS SEE ADMIN INSTRUCTIONS
Status: DISCONTINUED | OUTPATIENT
Start: 2021-09-28 | End: 2021-09-29

## 2021-09-28 RX ORDER — DEXTROSE MONOHYDRATE 25 G/50ML
50 INJECTION, SOLUTION INTRAVENOUS
Status: DISCONTINUED | OUTPATIENT
Start: 2021-09-28 | End: 2021-09-29

## 2021-09-28 RX ORDER — NITROGLYCERIN 0.4 MG/1
0.4 TABLET SUBLINGUAL ONCE
Status: COMPLETED | OUTPATIENT
Start: 2021-09-28 | End: 2021-09-29

## 2021-09-28 RX ORDER — METOPROLOL TARTRATE 50 MG/1
50 TABLET, FILM COATED ORAL ONCE AS NEEDED
Status: COMPLETED | OUTPATIENT
Start: 2021-09-28 | End: 2021-09-29

## 2021-09-28 RX ORDER — DILTIAZEM HYDROCHLORIDE 5 MG/ML
5 INJECTION INTRAVENOUS SEE ADMIN INSTRUCTIONS
Status: DISCONTINUED | OUTPATIENT
Start: 2021-09-28 | End: 2021-09-29

## 2021-09-28 RX ORDER — TEMAZEPAM 15 MG/1
15 CAPSULE ORAL NIGHTLY PRN
Status: DISCONTINUED | OUTPATIENT
Start: 2021-09-28 | End: 2021-09-29

## 2021-09-28 RX ORDER — METOPROLOL TARTRATE 50 MG/1
50 TABLET, FILM COATED ORAL ONCE
Status: COMPLETED | OUTPATIENT
Start: 2021-09-28 | End: 2021-09-28

## 2021-09-28 RX ORDER — HYDROCODONE BITARTRATE AND ACETAMINOPHEN 5; 325 MG/1; MG/1
1 TABLET ORAL EVERY 4 HOURS PRN
Status: DISCONTINUED | OUTPATIENT
Start: 2021-09-28 | End: 2021-09-29

## 2021-09-28 RX ORDER — ASPIRIN 325 MG
325 TABLET ORAL DAILY
Status: DISCONTINUED | OUTPATIENT
Start: 2021-09-28 | End: 2021-09-29

## 2021-09-28 RX ORDER — METOPROLOL TARTRATE 50 MG/1
50 TABLET, FILM COATED ORAL ONCE
Status: COMPLETED | OUTPATIENT
Start: 2021-09-29 | End: 2021-09-29

## 2021-09-28 RX ORDER — METOPROLOL TARTRATE 100 MG/1
100 TABLET ORAL ONCE
Status: COMPLETED | OUTPATIENT
Start: 2021-09-29 | End: 2021-09-29

## 2021-09-28 RX ORDER — HYDROCODONE BITARTRATE AND ACETAMINOPHEN 5; 325 MG/1; MG/1
2 TABLET ORAL EVERY 4 HOURS PRN
Status: DISCONTINUED | OUTPATIENT
Start: 2021-09-28 | End: 2021-09-29

## 2021-09-28 NOTE — ED QUICK NOTES
Orders for admission, patient is aware of plan and ready to go upstairs.  Any questions, please call ED RN Fadumo Kwon  at extension [phone]   Type of COVID test sent:RAPID  COVID Suspicion level: Low        LOC at time of transport:  AX[facility]

## 2021-09-28 NOTE — H&P
[facility]    PATIENT'S NAME: Abner Milvia   ATTENDING PHYSICIAN: Alden Urias MD   PATIENT ACCOUNT#:   [id number]    LOCATION:  [address] [place] [address] RECORD #:   [id number]       YOB: 1965  ADMISSION DATE:       09/28/202 Anxious. No acute distress. VITAL SIGNS:  Temperature 98.2, pulse 82, respiratory rate 13, blood pressure 128/81, pulse ox 98% on room air. HEENT:  Atraumatic. Oropharynx clear. Moist mucous membranes. Normal hard and soft palate.   Eyes:  Anicteric

## 2021-09-28 NOTE — ED PROVIDER NOTES
Patient Seen in: [facility] AND [facility] Emergency Department      History   Patient presents with:  Chest Pain Angina    Stated Complaint:     Subjective:   HPI    55-year-old female presents for evaluation of chest pain.   Patient reports while driving to wor other systems reviewed and negative except as noted above.     Physical Exam     ED Triage Vitals [09/28/21 1302]   BP (!) 135/103   Pulse 81   Resp 20   Temp 98.2 °F (36.8 °C)   Temp src Temporal   SpO2 98 %   O2 Device None (Room air)       Current: Differential With Platelet.   Procedure                               Abnormality         Status                     ---------                               -----------         ------                     CBC W/ DIFFERENTIAL[[id number]]          Abnormal includes ACS, PE, aortic aneurysm or dissection, pericarditis, musculoskeletal strain, GERD, anxiety    Well-appearing patient with unremarkable emergency department evaluation, however given concerning description of pain admitted for further cardiac eval

## 2021-09-28 NOTE — ED INITIAL ASSESSMENT (HPI)
Left-sided chest pressure radiating into neck and both arms that began this am. Patient also reports lightheadedness, diaphoresis.

## 2021-09-28 NOTE — TELEPHONE ENCOUNTER
Action Requested: Summary for Provider     []  Critical Lab, Recommendations Needed  [] Need Additional Advice  []   FYI    []   Need Orders  [] Need Medications Sent to Pharmacy  []  Other     SUMMARY: Spoke with patient RE: Oni wilkinson notes below:    P

## 2021-09-28 NOTE — PLAN OF CARE
Li King is A&O x4 on R. A. Patient came to unit in stable condition. Admission done and MD notified.      Problem: Patient Centered Care  Goal: Patient preferences are identified and integrated in the patient's plan of care  Description: Interventions:  - Evaluate effectiveness of vasoactive medications to optimize hemodynamic stability  - Monitor arterial and/or venous puncture sites for bleeding and/or hematoma  - Assess quality of pulses, skin color and temperature  - Assess for signs of decreased corona status, cognitive ability or social support system  Outcome: Progressing

## 2021-09-28 NOTE — PROGRESS NOTES
HPI    Patient presents for chest pain that presented this morning. Felt tingling and pain in her neck and bilateral arms and felt     Still with some tingling in bilateral arms and chest.      Pain coming and going. Denies personal cardiac history.   Pos • Hysterectomy  1992   • Other surgical history  2008    Bladder surgery       Family History   Problem Relation Age of Onset   • Diabetes Father    • Cancer Father         Lung CA   • Cancer Mother         Uterus Cancer; age 36 cause of death   • [address] Insecurity:       Worried About Running Out of Food in the Last Year: Not on file      Ran Out of Food in the Last Year: Not on file  Transportation Needs:       Lack of Transportation (Medical): Not on file      Lack of Transportation (Non-Medical):  Not o 1-0.05 % External Cream Apply 1 Application topically 2 (two) times daily as needed. (Patient not taking: No sig reported) 45 g 1   • mupirocin (BACTROBAN) 2 % External Ointment Apply 1 Application topically 3 (three) times daily.  (Patient not taking: No s

## 2021-09-29 ENCOUNTER — APPOINTMENT (OUTPATIENT)
Dept: CT IMAGING | Facility: HOSPITAL | Age: 56
End: 2021-09-29
Attending: HOSPITALIST
Payer: COMMERCIAL

## 2021-09-29 VITALS
BODY MASS INDEX: 33.44 KG/M2 | HEART RATE: 63 BPM | SYSTOLIC BLOOD PRESSURE: 138 MMHG | DIASTOLIC BLOOD PRESSURE: 84 MMHG | TEMPERATURE: 99 F | RESPIRATION RATE: 18 BRPM | OXYGEN SATURATION: 98 % | WEIGHT: 200.69 LBS | HEIGHT: 65 IN

## 2021-09-29 PROCEDURE — 99217 OBSERVATION CARE DISCHARGE: CPT | Performed by: HOSPITALIST

## 2021-09-29 PROCEDURE — 75574 CT ANGIO HRT W/3D IMAGE: CPT | Performed by: HOSPITALIST

## 2021-09-29 RX ORDER — METOPROLOL TARTRATE 5 MG/5ML
5 INJECTION INTRAVENOUS SEE ADMIN INSTRUCTIONS
Status: DISCONTINUED | OUTPATIENT
Start: 2021-09-29 | End: 2021-09-29

## 2021-09-29 RX ORDER — DILTIAZEM HYDROCHLORIDE 5 MG/ML
INJECTION INTRAVENOUS
Status: DISCONTINUED
Start: 2021-09-29 | End: 2021-09-29

## 2021-09-29 RX ORDER — METOPROLOL TARTRATE 5 MG/5ML
INJECTION INTRAVENOUS
Status: DISCONTINUED
Start: 2021-09-29 | End: 2021-09-29

## 2021-09-29 RX ORDER — DILTIAZEM HYDROCHLORIDE 5 MG/ML
5 INJECTION INTRAVENOUS SEE ADMIN INSTRUCTIONS
Status: DISCONTINUED | OUTPATIENT
Start: 2021-09-29 | End: 2021-09-29

## 2021-09-29 NOTE — PLAN OF CARE
Patient is alert and oriented x 4, on room air, on tele, CTA completed today. Pt discharged, all questions and concerns answered, IV removed, all pt belongings at bedside,  at bedside.     Problem: Patient Centered Care  Goal: Patient preferences are hemodynamic stability  Description: INTERVENTIONS:  - Monitor vital signs, rhythm, and trends  - Monitor for bleeding, hypotension and signs of decreased cardiac output  - Evaluate effectiveness of vasoactive medications to optimize hemodynamic stability health  - Refer to Case Management Department for coordinating discharge planning if the patient needs post-hospital services based on physician/LIP order or complex needs related to functional status, cognitive ability or social support system  Outcome: A

## 2021-09-29 NOTE — DISCHARGE SUMMARY
[facility]    Discharge Summary    Liset Bloom Patient Status:  Observation    1965 MRN [id number]   Location [facility] 3W/SW Attending Tarun Trevino MD   Hosp Day # 0 PCP Daria Colindres MD     Date of Admissio Present Illness:   Per Dr. Garima Tracey   The patient is a 52-year-old  female who came into the emergency department for evaluation of midsternal chest discomfort radiating to both shoulders and base of her neck. CBC and chemistry unremarkable.   GFR 2201 . [facility]       Greater than 35 minutes spent, >50% spent counseling re: treatment plan and workup    Boogie Ryan.  Jeana Reyez MD  9/29/2021

## 2021-09-29 NOTE — IMAGING NOTE
TO RAD HOLDING AT 0955      HX TAKEN : chest pain    PT CONSENTED AT ON UNIT     BASELINE VITAL SIGNS   HR 63  /64 BMI 33.4 /  LB    CTA ORDERED BY ANABELLE WAS PT GIVEN CTA  PREMEDS NO, IF  MG LAST NOC, AND 50 MG PO X 2 DOSES THIS AM

## 2021-09-29 NOTE — PLAN OF CARE
Patient resting well overnight. No complaints of chest pain. Plan for CT angio today. Patient to discharge home when able.          Problem: Patient Centered Care  Goal: Patient preferences are identified and integrated in the patient's plan of care  Sheldon decreased cardiac output  - Evaluate effectiveness of vasoactive medications to optimize hemodynamic stability  - Monitor arterial and/or venous puncture sites for bleeding and/or hematoma  - Assess quality of pulses, skin color and temperature  - Assess f needs related to functional status, cognitive ability or social support system  Outcome: Progressing

## 2021-09-30 ENCOUNTER — PATIENT OUTREACH (OUTPATIENT)
Dept: CASE MANAGEMENT | Age: 56
End: 2021-09-30

## 2021-09-30 DIAGNOSIS — R07.9 CHEST PAIN OF UNCERTAIN ETIOLOGY: ICD-10-CM

## 2021-09-30 DIAGNOSIS — E11.9 TYPE 2 DIABETES MELLITUS WITHOUT RETINOPATHY (HCC): ICD-10-CM

## 2021-09-30 DIAGNOSIS — Z02.9 ENCOUNTERS FOR UNSPECIFIED ADMINISTRATIVE PURPOSE: ICD-10-CM

## 2021-09-30 PROCEDURE — 1111F DSCHRG MED/CURRENT MED MERGE: CPT

## 2021-09-30 NOTE — PROGRESS NOTES
Initial Post Discharge Follow Up   Discharge Date: 9/29/21  Contact Date: 9/30/2021    Consent Verification:  Assessment Completed With: Patient  HIPAA Verified?   Yes    Discharge Dx:     Chest pain of uncertain etiology  S/P CTA    Was TCC ordered: no mg total) by mouth every morning before breakfast. 30 tablet 5   • Canagliflozin (INVOKANA) 100 MG Oral Tab Take 100 mg by mouth before breakfast. 90 tablet 2   • Cholecalciferol (VITAMIN D3) 1.25 MG (03305 UT) Oral Cap Take 1 tablet by mouth once a week. cancel any appointment or you may be charged a $40 No Show Fee. Please notify your physician office.        Aug 19, 2022  9:00 AM CDT Uro Follow Up Pre/Post Op with Harry Pereira DO [facility] for [facility] and orders reviewed and discussed. Any changes or updates to medications and or orders sent to PCP.      For patients with TCC appointments:     NCM offered sooner TCC appointment if schedule allowed: NA    []  Advised patient to bring all medications and

## 2021-10-05 ENCOUNTER — OFFICE VISIT (OUTPATIENT)
Dept: FAMILY MEDICINE CLINIC | Facility: CLINIC | Age: 56
End: 2021-10-05
Payer: COMMERCIAL

## 2021-10-05 ENCOUNTER — PATIENT MESSAGE (OUTPATIENT)
Dept: FAMILY MEDICINE CLINIC | Facility: CLINIC | Age: 56
End: 2021-10-05

## 2021-10-05 VITALS
WEIGHT: 202.38 LBS | HEART RATE: 77 BPM | DIASTOLIC BLOOD PRESSURE: 68 MMHG | TEMPERATURE: 97 F | BODY MASS INDEX: 33.72 KG/M2 | OXYGEN SATURATION: 98 % | SYSTOLIC BLOOD PRESSURE: 123 MMHG | HEIGHT: 65 IN

## 2021-10-05 DIAGNOSIS — E11.9 TYPE 2 DIABETES MELLITUS WITHOUT RETINOPATHY (HCC): ICD-10-CM

## 2021-10-05 DIAGNOSIS — R07.9 CHEST PAIN OF UNCERTAIN ETIOLOGY: Primary | ICD-10-CM

## 2021-10-05 DIAGNOSIS — S16.1XXA NECK STRAIN: ICD-10-CM

## 2021-10-05 PROCEDURE — 90472 IMMUNIZATION ADMIN EACH ADD: CPT | Performed by: FAMILY MEDICINE

## 2021-10-05 PROCEDURE — 3008F BODY MASS INDEX DOCD: CPT | Performed by: FAMILY MEDICINE

## 2021-10-05 PROCEDURE — 90686 IIV4 VACC NO PRSV 0.5 ML IM: CPT | Performed by: FAMILY MEDICINE

## 2021-10-05 PROCEDURE — 90471 IMMUNIZATION ADMIN: CPT | Performed by: FAMILY MEDICINE

## 2021-10-05 PROCEDURE — 3074F SYST BP LT 130 MM HG: CPT | Performed by: FAMILY MEDICINE

## 2021-10-05 PROCEDURE — 3078F DIAST BP <80 MM HG: CPT | Performed by: FAMILY MEDICINE

## 2021-10-05 PROCEDURE — 99495 TRANSJ CARE MGMT MOD F2F 14D: CPT | Performed by: FAMILY MEDICINE

## 2021-10-05 PROCEDURE — 90732 PPSV23 VACC 2 YRS+ SUBQ/IM: CPT | Performed by: FAMILY MEDICINE

## 2021-10-05 NOTE — PROGRESS NOTES
HPI:    Claudio Arndt is a 55year old female here today for hospital follow up.    She was discharged from Inpatient hospital, [facility]  to Home   Admission Date: 9/28/21   Discharge Date: 9/29/21  Hospital Discharge Diagnoses (since 9/5/20 proximal right iliac and stenosis of less than 25%  Calcium score 0  Stable for dc at this time  Her symptoms have resolved, consider msk etiology     2.       Mild renal insufficiency. Improved on repeat labs     3.       Diabetes mellitus type 2.    Con smoking history. She has never used smokeless tobacco. She reports current alcohol use. She reports that she does not use drugs.      ROS:   GENERAL: weight stable, energy stable, no sweating  SKIN: denies any unusual skin lesions  EYES: denies blurred visi Transitional Care Management Certification:  I certify that the following are true:  Communication with the patient was made within 2 business days of discharge on date above   Medical Decision Making- Based on service period of discharge to 30 days:

## 2021-10-06 RX ORDER — CYCLOBENZAPRINE HCL 10 MG
10 TABLET ORAL NIGHTLY
Qty: 40 TABLET | Refills: 0 | Status: SHIPPED | OUTPATIENT
Start: 2021-10-06

## 2021-10-06 NOTE — TELEPHONE ENCOUNTER
From: Carrie Paget  To: Dewey Shone, MD  Sent: 10/5/2021 8:01 PM CDT  Subject: Colon Girish Dr. Corinna Carolina   I was at your office today and I forget to request for a refill for this Medicine I use this only jhony I needed CYCLOBENZAPRIN 10 MG     Thank

## 2021-10-30 ENCOUNTER — IMMUNIZATION (OUTPATIENT)
Dept: LAB | Facility: HOSPITAL | Age: 56
End: 2021-10-30
Attending: EMERGENCY MEDICINE
Payer: COMMERCIAL

## 2021-10-30 DIAGNOSIS — Z23 NEED FOR VACCINATION: Primary | ICD-10-CM

## 2021-10-30 PROCEDURE — 0064A SARSCOV2 VAC 50MCG/0.25ML IM: CPT

## 2021-11-22 ENCOUNTER — NURSE TRIAGE (OUTPATIENT)
Dept: FAMILY MEDICINE CLINIC | Facility: CLINIC | Age: 56
End: 2021-11-22

## 2021-11-22 NOTE — TELEPHONE ENCOUNTER
Please call Pt to Triage. MRI was done in January 2021 and was for lower back pain. Pt c/o waist and abdominal pain in [employer] message.           ----- Message from Evelyn Queen sent at 11/22/2021  7:49 AM CST -----  Regarding: Very strong waist pain

## 2021-11-22 NOTE — TELEPHONE ENCOUNTER
Action Requested: Summary for Provider     []  Critical Lab, Recommendations Needed  [] Need Additional Advice  []   FYI    []   Need Orders  [] Need Medications Sent to Pharmacy  []  Other   See [employer] message    SUMMARY:sent to ER  S/s waist area , fron

## 2021-11-23 ENCOUNTER — HOSPITAL ENCOUNTER (EMERGENCY)
Facility: HOSPITAL | Age: 56
Discharge: HOME | End: 2021-11-23
Attending: EMERGENCY MEDICINE
Payer: COMMERCIAL

## 2021-11-23 VITALS
TEMPERATURE: 97 F | DIASTOLIC BLOOD PRESSURE: 86 MMHG | WEIGHT: 200 LBS | OXYGEN SATURATION: 97 % | HEART RATE: 75 BPM | HEIGHT: 65 IN | SYSTOLIC BLOOD PRESSURE: 120 MMHG | BODY MASS INDEX: 33.32 KG/M2 | RESPIRATION RATE: 20 BRPM

## 2021-11-23 DIAGNOSIS — M54.59 INTRACTABLE LOW BACK PAIN: Primary | ICD-10-CM

## 2021-11-23 PROCEDURE — 99282 EMERGENCY DEPT VISIT SF MDM: CPT

## 2021-11-23 RX ORDER — ACETAMINOPHEN 500 MG
1000 TABLET ORAL ONCE
Status: COMPLETED | OUTPATIENT
Start: 2021-11-23 | End: 2021-11-23

## 2021-11-23 NOTE — ED INITIAL ASSESSMENT (HPI)
Lower back pain radiating to abdomen since Saturday. Reports pain mainly with getting up. Reprots some relief with ibuprofen.

## 2021-11-23 NOTE — TELEPHONE ENCOUNTER
Any of our physiatrists are great, it doesn't necessarily have to be with Dr Carroll Anthony. She may follow up with the first available for any of the providers.

## 2021-11-23 NOTE — TELEPHONE ENCOUNTER
Mansoor Hurtado for Dr. Kristofer Fernández. Pt called and she stated that she was in the ER this morning.  Per pt they discharge her and told her to f/u with Dr. Augusta Mendoza pt did call Dr. Augusta Mendoza office and the soonest appt is 12/300/2021 and she was placed on a waiting list.Pt wi

## 2021-11-23 NOTE — ED QUICK NOTES
Pt provided and explained d/c instructions, at-home care, follow-up, and otc rx. Pt in nad at this time. No iv access. Vss. Kenyon. Ambulatory. A&ox3. Belongings with pt. All questions and concerns addressed.

## 2021-11-23 NOTE — ED PROVIDER NOTES
Patient Seen in: [facility] AND [facility] Emergency Department      History   Patient presents with:  Pain    Stated Complaint: back pain     Subjective:   HPI    Patient is 43-year-old female who presents with bilateral low back pain x2 to 3 days.   She states back pain   Other systems are as noted in HPI. Constitutional and vital signs reviewed. All other systems reviewed and negative except as noted above.     Physical Exam     ED Triage Vitals [11/23/21 0312]   /84   Pulse 74   Resp 19   Temp 96.9

## 2021-11-24 ENCOUNTER — OFFICE VISIT (OUTPATIENT)
Dept: PHYSICAL MEDICINE AND REHAB | Facility: CLINIC | Age: 56
End: 2021-11-24
Payer: COMMERCIAL

## 2021-11-24 VITALS
BODY MASS INDEX: 33.32 KG/M2 | HEART RATE: 83 BPM | HEIGHT: 65 IN | SYSTOLIC BLOOD PRESSURE: 128 MMHG | DIASTOLIC BLOOD PRESSURE: 80 MMHG | OXYGEN SATURATION: 98 % | WEIGHT: 200 LBS

## 2021-11-24 DIAGNOSIS — M54.59 MECHANICAL LOW BACK PAIN: Primary | ICD-10-CM

## 2021-11-24 DIAGNOSIS — M51.26 BULGE OF LUMBAR DISC WITHOUT MYELOPATHY: ICD-10-CM

## 2021-11-24 DIAGNOSIS — M47.816 LUMBAR SPONDYLOSIS: ICD-10-CM

## 2021-11-24 DIAGNOSIS — M51.37 DDD (DEGENERATIVE DISC DISEASE), LUMBOSACRAL: ICD-10-CM

## 2021-11-24 DIAGNOSIS — M47.816 FACET SYNDROME, LUMBAR: ICD-10-CM

## 2021-11-24 DIAGNOSIS — E66.09 CLASS 1 OBESITY DUE TO EXCESS CALORIES WITH SERIOUS COMORBIDITY AND BODY MASS INDEX (BMI) OF 33.0 TO 33.9 IN ADULT: ICD-10-CM

## 2021-11-24 DIAGNOSIS — M51.16 LUMBAR DISC HERNIATION WITH RADICULOPATHY: ICD-10-CM

## 2021-11-24 DIAGNOSIS — M54.59 LUMBAR TRIGGER POINT SYNDROME: ICD-10-CM

## 2021-11-24 DIAGNOSIS — M48.061 LUMBAR FORAMINAL STENOSIS: ICD-10-CM

## 2021-11-24 PROCEDURE — 99244 OFF/OP CNSLTJ NEW/EST MOD 40: CPT | Performed by: PHYSICAL MEDICINE & REHABILITATION

## 2021-11-24 PROCEDURE — 3074F SYST BP LT 130 MM HG: CPT | Performed by: PHYSICAL MEDICINE & REHABILITATION

## 2021-11-24 PROCEDURE — 3008F BODY MASS INDEX DOCD: CPT | Performed by: PHYSICAL MEDICINE & REHABILITATION

## 2021-11-24 PROCEDURE — 3079F DIAST BP 80-89 MM HG: CPT | Performed by: PHYSICAL MEDICINE & REHABILITATION

## 2021-11-24 RX ORDER — NAPROXEN 500 MG/1
500 TABLET ORAL 2 TIMES DAILY WITH MEALS
Qty: 60 TABLET | Refills: 0 | Status: SHIPPED | OUTPATIENT
Start: 2021-11-24 | End: 2021-12-21

## 2021-11-24 NOTE — H&P
[address] H&P    Requesting Physician: Randee Woods MD    Chief Complaint (Reason for Visit):  Patient presents with:  Low Back Pain: New right h. pt. is here for low back pain. Since 2020.  No i FAMILY HISTORY:   Family History   Problem Relation Age of Onset   • Diabetes Father    • Cancer Father         Lung CA   • Cancer Mother         Uterus Cancer; age 36 cause of death   • Glaucoma Neg    • Macular degeneration Neg        SOCIAL HISTORY: Musculoskeletal: As per HPI   Skin: Negative. Neurological: As per HPI  Endo/Heme/Allergies: Negative. Psychiatric/Behavioral: Negative. All other systems reviewed and are negative. Pertinent positives and negatives noted in the HPI.         Armando Roe pain        Gait:  antalgic    Data  Admission on 09/28/2021, Discharged on 09/29/2021   Component Date Value Ref Range Status   • Hold Lavender 09/28/2021 Auto Resulted   Final   • Hold Lt Green 09/28/2021 Auto Resulted   Final   • Hold Gold 09/28/2021 Au 09/28/2021 0.02  0.00 - 1.00 x10(3) uL Final   • Neutrophil % 09/28/2021 50.3  % Final   • Lymphocyte % 09/28/2021 39.6  % Final   • Monocyte % 09/28/2021 6.9  % Final   • Eosinophil % 09/28/2021 2.4  % Final   • Basophil % 09/28/2021 0.5  % Final   • Basilia 3. 18  1.50 - 7.70 x10 (3) uL Final   • Neutrophil Absolute 09/29/2021 3.18  1.50 - 7.70 x10(3) uL Final   • Lymphocyte Absolute 09/29/2021 3.42  1.00 - 4.00 x10(3) uL Final   • Monocyte Absolute 09/29/2021 0.57  0.10 - 1.00 x10(3) uL Final   • Eosinophil A conus which terminates at the L1-L2 vertebral level.  No spinal cord signal abnormalities are identified. No pars defects are visualized. T11-T12: Well-preserved disc height and signal with no significant disc bulge.  No significant spinal stenosis or n right L5 and left S1 dermatomes. This does correlate well with her MRI findings of the multilevel degenerative changes most notably at L4-L5 and L5-S1 with neuroforaminal stenosis. I am recommending she restart physical therapy, Naprosyn, and Tylenol.   I

## 2021-11-26 RX ORDER — CANAGLIFLOZIN 100 MG/1
100 TABLET, FILM COATED ORAL
Qty: 90 TABLET | Refills: 1 | Status: SHIPPED | OUTPATIENT
Start: 2021-11-26 | End: 2022-02-28

## 2021-11-26 NOTE — TELEPHONE ENCOUNTER
Refill passed per [place], [facility] protocol.     Requested Prescriptions   Pending Prescriptions Disp Refills    INVOKANA 100 MG Oral Tab [Pharmacy Med Name: INVOKANA 100MG TABLETS] 90 tablet 2     Sig: TAKE 1 TABLET BY MOUTH BEFORE BREAKFAST        Diabetes Medication Protocol Passed - 11/26/2021 11:24 AM        Passed - Last A1C < 7.5 and within past 6 months     Lab Results   Component Value Date    A1C 6.3 (H) 09/28/2021               Passed - Appointment in past 6 or next 3 months        Passed - GFR Non- > 50     Lab Results   Component Value Date    GFRNAA 98 09/29/2021                 Passed - GFR in the past 12 months              Future Appointments         Provider Department Appt Notes    In 1 month William Barbour, PT DONALD Max in [facility] ppo    In 1 month [address] [place] [address], HARRISON Max in [facility]     In 1 month [facility], [address] in [facility]     In 1 month Luh Enriquez, [address] [facility], [address], [address] Not sure    In 1 month [facility], [address] in [facility]     In 1 month [address] [place] [address], [address] in [facility]     In 2 months [address] [place] [address], [address] in [facility]     In 2 months [address] [place] [address], [address] in [facility]     In 2 months [address] [place] [address], [address] in [facility]     In 8 months Khushi Baltazar, [address] for Pelvic Medicine yearly             Recent Outpatient Visits              2 days ago Mechanical low back pain    [facility] Saira Abebe MD    Office Visit    1 month ago Chest pain of uncertain etiology    [facility], [facility], [address], Stefania Batista MD    Office Visit    1 month ago Chest pain, unspecified type    [facility], [facility], [address], [facility] Ce Iraheta, [employer] Visit    3 months ago Postmenopausal atrophic vaginitis    [facility] Norma Cain DO    Office Visit    3 months ago Gastroesophageal reflux disease without esophagitis    Gastroenterology - Mariely Wheeler MD    Office Visit

## 2021-11-29 ENCOUNTER — TELEPHONE (OUTPATIENT)
Dept: NEUROLOGY | Facility: CLINIC | Age: 56
End: 2021-11-29

## 2021-11-29 NOTE — TELEPHONE ENCOUNTER
Patient has been scheduled for Caudal NIKHIL MAC on 12/8/21 at the [facility] with Dr.Behar.   -Anesthesia type: MAC.  -If receiving MAC or IVC sedation patient will need to get COVID tested 3 days prior even if already vaccinated (order placed by [facility].)  -Patient

## 2021-11-29 NOTE — TELEPHONE ENCOUNTER
[employer] for authorization of approval for Caudal NIKHIL cpt code [id number], [id number]. Authorization is not required. Transaction ID: [id number]. Will inform Nursing.

## 2021-12-05 ENCOUNTER — LAB REQUISITION (OUTPATIENT)
Dept: SURGERY | Age: 56
End: 2021-12-05
Payer: COMMERCIAL

## 2021-12-05 DIAGNOSIS — Z01.818 PREOP EXAMINATION: ICD-10-CM

## 2021-12-08 ENCOUNTER — OFFICE VISIT (OUTPATIENT)
Dept: SURGERY | Facility: CLINIC | Age: 56
End: 2021-12-08

## 2021-12-08 DIAGNOSIS — M48.062 SPINAL STENOSIS OF LUMBAR REGION WITH NEUROGENIC CLAUDICATION: ICD-10-CM

## 2021-12-08 DIAGNOSIS — M47.816 LUMBAR SPONDYLOSIS: ICD-10-CM

## 2021-12-08 DIAGNOSIS — M51.26 BULGE OF LUMBAR DISC WITHOUT MYELOPATHY: ICD-10-CM

## 2021-12-08 DIAGNOSIS — M48.061 LUMBAR FORAMINAL STENOSIS: ICD-10-CM

## 2021-12-08 DIAGNOSIS — M51.37 DDD (DEGENERATIVE DISC DISEASE), LUMBOSACRAL: ICD-10-CM

## 2021-12-08 DIAGNOSIS — M51.16 LUMBAR DISC HERNIATION WITH RADICULOPATHY: ICD-10-CM

## 2021-12-08 DIAGNOSIS — M54.59 MECHANICAL LOW BACK PAIN: Primary | ICD-10-CM

## 2021-12-08 PROCEDURE — 62323 NJX INTERLAMINAR LMBR/SAC: CPT | Performed by: PHYSICAL MEDICINE & REHABILITATION

## 2021-12-08 NOTE — PROCEDURES
Mikael Tovar.    Caudal Epidural Steroid Injection  NAME:  Liset Bloom    MR #:    [id number] :  1965     PHYSICIAN:  Wally Nettles MD        Operative Report    DATE OF PROCEDURE: 2021   PREOPERATIVE DIAGNOSES: 1.  [facility] [address]

## 2021-12-21 RX ORDER — NAPROXEN 500 MG/1
TABLET ORAL
Qty: 60 TABLET | Refills: 0 | Status: SHIPPED | OUTPATIENT
Start: 2021-12-21

## 2021-12-21 NOTE — TELEPHONE ENCOUNTER
Medication request: naproxen (NAPROSYN) 500 MG Oral Tab  Sig:   Take 1 tablet (500 mg total) by mouth 2 (two) times daily with meals. Take for 2 weeks as directed and then as needed.     BFK:02/43/7236  GRR:10/78/5538    [facility]/Last refill:11/24/2021  Q-60 R

## 2021-12-24 ENCOUNTER — TELEMEDICINE (OUTPATIENT)
Dept: PHYSICAL MEDICINE AND REHAB | Facility: CLINIC | Age: 56
End: 2021-12-24
Payer: COMMERCIAL

## 2021-12-24 DIAGNOSIS — M54.59 MECHANICAL LOW BACK PAIN: Primary | ICD-10-CM

## 2021-12-24 DIAGNOSIS — M47.816 LUMBAR SPONDYLOSIS: ICD-10-CM

## 2021-12-24 DIAGNOSIS — M51.37 DDD (DEGENERATIVE DISC DISEASE), LUMBOSACRAL: ICD-10-CM

## 2021-12-24 DIAGNOSIS — M47.816 FACET SYNDROME, LUMBAR: ICD-10-CM

## 2021-12-24 DIAGNOSIS — M51.16 LUMBAR DISC HERNIATION WITH RADICULOPATHY: ICD-10-CM

## 2021-12-24 DIAGNOSIS — E66.09 CLASS 1 OBESITY DUE TO EXCESS CALORIES WITH SERIOUS COMORBIDITY AND BODY MASS INDEX (BMI) OF 33.0 TO 33.9 IN ADULT: ICD-10-CM

## 2021-12-24 DIAGNOSIS — M48.061 LUMBAR FORAMINAL STENOSIS: ICD-10-CM

## 2021-12-24 PROCEDURE — 99213 OFFICE O/P EST LOW 20 MIN: CPT | Performed by: PHYSICAL MEDICINE & REHABILITATION

## 2021-12-24 NOTE — PATIENT INSTRUCTIONS
1) Tylenol 500-1000 mg every 6-8 hours as needed for pain. No more than 3000 mg daily. 2) continue with home exercise program  3) Follow up with me as needed. If symptoms return, then would recommend caudal NIKHIL.

## 2021-12-24 NOTE — PROGRESS NOTES
[address]  Video Visit Progress Note      Telehealth outside of [address] Verbal Consent   I conducted a telehealth visit with Lulu Rosales today, 12/24/21, which was completed using two-way, anterior pelvis and down the right leg. She is S/P caudal NIKHIL on 12/8/21 with 90% improvement. She rates her pain a 0/10 today. She is very satisfied with her treatment thus far and has been able to bend forward without any discomfort.   She also denies an degeneration Neg        CURRENT MEDICATIONS:   Current Outpatient Medications   Medication Sig Dispense Refill   • NAPROXEN 500 MG Oral Tab TAKE 1 TABLET(500 MG) BY MOUTH TWICE DAILY WITH MEALS FOR 2 WEEKS AS DIRECTED THEN AS NEEDED 60 tablet 0   • Canagli appropriate        Data  Lab Requisition on 12/05/2021   Component Date Value Ref Range Status   • SARS-CoV-2 (Alinity) 12/05/2021 Not Detected  Not Detected Final   Admission on 09/28/2021, Discharged on 09/29/2021   Component Date Value Ref Range Status 0.00 - 0.70 x10(3) uL Final   • Basophil Absolute 09/28/2021 0.04  0.00 - 0.20 x10(3) uL Final   • Immature Granulocyte Absolute 09/28/2021 0.02  0.00 - 1.00 x10(3) uL Final   • Neutrophil % 09/28/2021 50.3  % Final   • Lymphocyte % 09/28/2021 39.6  % Bebe 09/29/2021 13.1  11.0 - 15.0 % Final   • PLT 09/29/2021 309.0  150.0 - 450.0 10(3)uL Final   • Neutrophil Absolute Prelim 09/29/2021 3.18  1.50 - 7.70 x10 (3) uL Final   • Neutrophil Absolute 09/29/2021 3.18  1.50 - 7.70 x10(3) uL Final   • Lymphocyte Abso identified. Otherwise normal bone marrow signal    intensity with intact lumbar vertebral heights. Normal configuration of the conus which terminates at the L1-L2 vertebral level.  No spinal cord signal abnormalities are identified.  No pars defects are vis improvement in her symptoms. At this time, recommend she continue with home exercise program and use Tylenol as needed for pain. If her symptoms return, then I would repeat the caudal epidural steroid injection.   She can follow-up with me as needed going [address] [facility] [address]

## 2022-01-06 ENCOUNTER — APPOINTMENT (OUTPATIENT)
Dept: PHYSICAL THERAPY | Age: 57
End: 2022-01-06
Attending: PHYSICAL MEDICINE & REHABILITATION
Payer: COMMERCIAL

## 2022-01-13 ENCOUNTER — APPOINTMENT (OUTPATIENT)
Dept: PHYSICAL THERAPY | Age: 57
End: 2022-01-13
Attending: PHYSICAL MEDICINE & REHABILITATION
Payer: COMMERCIAL

## 2022-01-15 RX ORDER — NAPROXEN 500 MG/1
TABLET ORAL
Qty: 60 TABLET | Refills: 0 | OUTPATIENT
Start: 2022-01-15

## 2022-01-15 NOTE — TELEPHONE ENCOUNTER
Spoke to patient State prior to injection she had the pins and needle pain, pain went away after the injection 12/08/21. Pain been gone until Friday evening 1/14/22 when she felt the pins and needle pain rate it 1-2/10.  No activity that she know increased

## 2022-01-17 ENCOUNTER — APPOINTMENT (OUTPATIENT)
Dept: PHYSICAL THERAPY | Age: 57
End: 2022-01-17
Attending: PHYSICAL MEDICINE & REHABILITATION
Payer: COMMERCIAL

## 2022-01-20 ENCOUNTER — APPOINTMENT (OUTPATIENT)
Dept: PHYSICAL THERAPY | Age: 57
End: 2022-01-20
Attending: PHYSICAL MEDICINE & REHABILITATION
Payer: COMMERCIAL

## 2022-01-20 ENCOUNTER — OFFICE VISIT (OUTPATIENT)
Dept: SURGERY | Facility: CLINIC | Age: 57
End: 2022-01-20
Payer: COMMERCIAL

## 2022-01-20 VITALS
HEIGHT: 64.2 IN | WEIGHT: 204.5 LBS | DIASTOLIC BLOOD PRESSURE: 84 MMHG | BODY MASS INDEX: 34.91 KG/M2 | SYSTOLIC BLOOD PRESSURE: 122 MMHG | HEART RATE: 94 BPM | OXYGEN SATURATION: 99 %

## 2022-01-20 DIAGNOSIS — E55.9 VITAMIN D DEFICIENCY: ICD-10-CM

## 2022-01-20 DIAGNOSIS — R12 HEART BURN: ICD-10-CM

## 2022-01-20 DIAGNOSIS — R63.5 WEIGHT GAIN: ICD-10-CM

## 2022-01-20 DIAGNOSIS — Z51.81 ENCOUNTER FOR THERAPEUTIC DRUG MONITORING: ICD-10-CM

## 2022-01-20 DIAGNOSIS — E78.00 HYPERCHOLESTEROLEMIA: ICD-10-CM

## 2022-01-20 DIAGNOSIS — R53.83 FATIGUE, UNSPECIFIED TYPE: ICD-10-CM

## 2022-01-20 DIAGNOSIS — E11.9 TYPE 2 DIABETES MELLITUS WITHOUT RETINOPATHY (HCC): Primary | ICD-10-CM

## 2022-01-20 DIAGNOSIS — Z99.89 OSA ON CPAP: ICD-10-CM

## 2022-01-20 DIAGNOSIS — E66.9 OBESITY (BMI 30-39.9): ICD-10-CM

## 2022-01-20 DIAGNOSIS — G47.33 OSA ON CPAP: ICD-10-CM

## 2022-01-20 DIAGNOSIS — M54.50 LOW BACK PAIN, UNSPECIFIED BACK PAIN LATERALITY, UNSPECIFIED CHRONICITY, UNSPECIFIED WHETHER SCIATICA PRESENT: ICD-10-CM

## 2022-01-20 PROCEDURE — 3079F DIAST BP 80-89 MM HG: CPT | Performed by: NURSE PRACTITIONER

## 2022-01-20 PROCEDURE — 3008F BODY MASS INDEX DOCD: CPT | Performed by: NURSE PRACTITIONER

## 2022-01-20 PROCEDURE — 3074F SYST BP LT 130 MM HG: CPT | Performed by: NURSE PRACTITIONER

## 2022-01-20 PROCEDURE — 99215 OFFICE O/P EST HI 40 MIN: CPT | Performed by: NURSE PRACTITIONER

## 2022-01-20 RX ORDER — PHENTERMINE HYDROCHLORIDE 37.5 MG/1
37.5 TABLET ORAL
Qty: 30 TABLET | Refills: 1 | Status: SHIPPED | OUTPATIENT
Start: 2022-01-20

## 2022-01-20 RX ORDER — TOPIRAMATE 25 MG/1
25 TABLET ORAL DAILY
Qty: 30 TABLET | Refills: 1 | Status: SHIPPED | OUTPATIENT
Start: 2022-01-20 | End: 2022-01-20

## 2022-01-20 RX ORDER — TOPIRAMATE 25 MG/1
25 TABLET ORAL DAILY
Qty: 30 TABLET | Refills: 1 | Status: SHIPPED | OUTPATIENT
Start: 2022-01-20 | End: 2022-03-14

## 2022-01-20 NOTE — PROGRESS NOTES
The Wellness [facility] Weight Loss Consultation Note       Date of Consult:  2022    Patient:  Rimma Israel  :      1965  MRN:      [id number]    Referring Provider: Dr. Venecia Reeder       Chief Complaint:  Patient presents with:  Consult: nonsurgical lb (90.7 kg)  10/05/21 : 202 lb 6.4 oz (91.8 kg)  09/29/21 : 200 lb 11.2 oz (91 kg)  09/28/21 : 199 lb (90.3 kg)       Patient Medications:    Current Outpatient Medications   Medication Sig Dispense Refill   • Phentermine HCl 37.5 MG Oral Tab Take 1 table use: No      Sexual activity: Yes    Other Topics      Concerns:         Service: Not Asked        Blood Transfusions: Not Asked        Caffeine Concern: Yes          Coffee, 1 cup daily        Occupational Exposure: Not Asked        Hobby Hazards: Dinner     Coffee  Cream   Fruit 5 AM Fruit   Cookies  9 AM Home cooked- beans, eggs    Meat, rice     Enchilladas  Fruit  Similar to lunch      Starts work at 2 AM  Describes fullness with meat since gall bladder removal   After dinner behavior: - stops f gallop, regular rate and rhythm  Abdomen: soft, non-tender; bowel sounds normal; no masses,  no organomegaly and obese   Extremities: extremities normal, atraumatic, no cyanosis or edema  Pulses: 2+ and symmetric  Skin: Skin color, texture, turgor normal. DYSLIPIDEMIA: Recommend dietary changes and lifestyle modifications as discussed below. Monitor.      Lab Results   Component Value Date/Time    CHOLEST 216 (H) 11/07/2020 07:32 AM     (H) 11/07/2020 07:32 AM    HDL 57 11/07/2020 07:32 AM    TRIG tolerated. Discussed risks, benefits, rationale, and side effects of medication(s) including hypertension, palpitations, tachycardia, dizziness, constipation, dry mouth, and anxiety among others.  She understands that I will not call in the prescription

## 2022-01-20 NOTE — PATIENT INSTRUCTIONS
Start phentermine 1/2 tablet of 37.5 mg by mouth in the AM, increase to full tablet as tolerted. Add 25 mg of topiramate upon getting home from work. Plan to increase as tolerated.      For sleep:  Natural Calm  by [employer] Vitality  Follow dosage instruc

## 2022-01-24 ENCOUNTER — APPOINTMENT (OUTPATIENT)
Dept: PHYSICAL THERAPY | Age: 57
End: 2022-01-24
Attending: PHYSICAL MEDICINE & REHABILITATION
Payer: COMMERCIAL

## 2022-01-27 ENCOUNTER — APPOINTMENT (OUTPATIENT)
Dept: PHYSICAL THERAPY | Age: 57
End: 2022-01-27
Attending: PHYSICAL MEDICINE & REHABILITATION
Payer: COMMERCIAL

## 2022-01-31 ENCOUNTER — APPOINTMENT (OUTPATIENT)
Dept: PHYSICAL THERAPY | Age: 57
End: 2022-01-31
Attending: PHYSICAL MEDICINE & REHABILITATION
Payer: COMMERCIAL

## 2022-02-03 ENCOUNTER — APPOINTMENT (OUTPATIENT)
Dept: PHYSICAL THERAPY | Age: 57
End: 2022-02-03
Attending: PHYSICAL MEDICINE & REHABILITATION
Payer: COMMERCIAL

## 2022-02-04 ENCOUNTER — HOSPITAL ENCOUNTER (OUTPATIENT)
Age: 57
Discharge: HOME | End: 2022-02-04
Attending: EMERGENCY MEDICINE
Payer: COMMERCIAL

## 2022-02-04 VITALS
TEMPERATURE: 97 F | SYSTOLIC BLOOD PRESSURE: 128 MMHG | OXYGEN SATURATION: 96 % | DIASTOLIC BLOOD PRESSURE: 65 MMHG | HEART RATE: 98 BPM | RESPIRATION RATE: 18 BRPM

## 2022-02-04 DIAGNOSIS — J06.9 UPPER RESPIRATORY TRACT INFECTION, UNSPECIFIED TYPE: Primary | ICD-10-CM

## 2022-02-04 LAB
S PYO AG THROAT QL: NEGATIVE
SARS-COV-2 RNA RESP QL NAA+PROBE: NOT DETECTED

## 2022-02-04 PROCEDURE — 87880 STREP A ASSAY W/OPTIC: CPT

## 2022-02-04 PROCEDURE — 99213 OFFICE O/P EST LOW 20 MIN: CPT

## 2022-02-04 PROCEDURE — 99212 OFFICE O/P EST SF 10 MIN: CPT

## 2022-02-11 ENCOUNTER — HOSPITAL ENCOUNTER (OUTPATIENT)
Age: 57
Discharge: HOME | End: 2022-02-11
Payer: COMMERCIAL

## 2022-02-11 VITALS
RESPIRATION RATE: 20 BRPM | HEART RATE: 90 BPM | TEMPERATURE: 99 F | DIASTOLIC BLOOD PRESSURE: 78 MMHG | OXYGEN SATURATION: 100 % | SYSTOLIC BLOOD PRESSURE: 125 MMHG

## 2022-02-11 DIAGNOSIS — H65.92 LEFT OTITIS MEDIA WITH EFFUSION: Primary | ICD-10-CM

## 2022-02-11 PROCEDURE — 99213 OFFICE O/P EST LOW 20 MIN: CPT

## 2022-02-11 RX ORDER — AMOXICILLIN 875 MG/1
875 TABLET, COATED ORAL 2 TIMES DAILY
Qty: 14 TABLET | Refills: 0 | Status: SHIPPED | OUTPATIENT
Start: 2022-02-11 | End: 2022-02-18

## 2022-02-11 NOTE — ED INITIAL ASSESSMENT (HPI)
Seen here 2/4 for URI. States most of her symptoms are improved except for left ear pain. Worse since yesterday despite taking tylenol. No fever. No dizziness.

## 2022-02-24 ENCOUNTER — OFFICE VISIT (OUTPATIENT)
Dept: FAMILY MEDICINE CLINIC | Facility: CLINIC | Age: 57
End: 2022-02-24
Payer: COMMERCIAL

## 2022-02-24 VITALS
SYSTOLIC BLOOD PRESSURE: 108 MMHG | DIASTOLIC BLOOD PRESSURE: 63 MMHG | BODY MASS INDEX: 34.45 KG/M2 | TEMPERATURE: 97 F | HEIGHT: 64.2 IN | WEIGHT: 201.81 LBS | HEART RATE: 93 BPM

## 2022-02-24 DIAGNOSIS — E11.9 TYPE 2 DIABETES MELLITUS WITHOUT RETINOPATHY (HCC): Primary | ICD-10-CM

## 2022-02-24 DIAGNOSIS — Z12.31 VISIT FOR SCREENING MAMMOGRAM: ICD-10-CM

## 2022-02-24 DIAGNOSIS — H92.02 LEFT EAR PAIN: ICD-10-CM

## 2022-02-24 PROCEDURE — 99214 OFFICE O/P EST MOD 30 MIN: CPT | Performed by: FAMILY MEDICINE

## 2022-02-24 PROCEDURE — 3074F SYST BP LT 130 MM HG: CPT | Performed by: FAMILY MEDICINE

## 2022-02-24 PROCEDURE — 3008F BODY MASS INDEX DOCD: CPT | Performed by: FAMILY MEDICINE

## 2022-02-24 PROCEDURE — 3078F DIAST BP <80 MM HG: CPT | Performed by: FAMILY MEDICINE

## 2022-02-24 RX ORDER — LEVOCETIRIZINE DIHYDROCHLORIDE 5 MG/1
TABLET, FILM COATED ORAL
Qty: 90 TABLET | Refills: 0 | Status: SHIPPED | OUTPATIENT
Start: 2022-02-24

## 2022-02-24 RX ORDER — LEVOCETIRIZINE DIHYDROCHLORIDE 5 MG/1
5 TABLET, FILM COATED ORAL EVERY EVENING
Qty: 30 TABLET | Refills: 0 | Status: SHIPPED | OUTPATIENT
Start: 2022-02-24 | End: 2022-02-24

## 2022-02-24 RX ORDER — PREDNISONE 20 MG/1
20 TABLET ORAL DAILY
Qty: 5 TABLET | Refills: 0 | Status: SHIPPED | OUTPATIENT
Start: 2022-02-24 | End: 2022-03-01

## 2022-02-27 ENCOUNTER — LAB ENCOUNTER (OUTPATIENT)
Dept: LAB | Facility: HOSPITAL | Age: 57
End: 2022-02-27
Attending: FAMILY MEDICINE
Payer: COMMERCIAL

## 2022-02-27 DIAGNOSIS — E11.9 TYPE 2 DIABETES MELLITUS WITHOUT RETINOPATHY (HCC): ICD-10-CM

## 2022-02-27 LAB
ALBUMIN SERPL-MCNC: 3.8 G/DL (ref 3.4–5)
ALBUMIN/GLOB SERPL: 1 {RATIO} (ref 1–2)
ALP LIVER SERPL-CCNC: 149 U/L
ALT SERPL-CCNC: 24 U/L
ANION GAP SERPL CALC-SCNC: 8 MMOL/L (ref 0–18)
AST SERPL-CCNC: 13 U/L (ref 15–37)
BILIRUB SERPL-MCNC: 0.6 MG/DL (ref 0.1–2)
BUN BLD-MCNC: 16 MG/DL (ref 7–18)
CALCIUM BLD-MCNC: 8.9 MG/DL (ref 8.5–10.1)
CHLORIDE SERPL-SCNC: 106 MMOL/L (ref 98–112)
CHOLEST SERPL-MCNC: 224 MG/DL (ref ?–200)
CO2 SERPL-SCNC: 27 MMOL/L (ref 21–32)
CREAT BLD-MCNC: 0.67 MG/DL
CREAT UR-SCNC: 115 MG/DL
EST. AVERAGE GLUCOSE BLD GHB EST-MCNC: 134 MG/DL (ref 68–126)
FASTING PATIENT LIPID ANSWER: YES
FASTING STATUS PATIENT QL REPORTED: YES
GLOBULIN PLAS-MCNC: 4 G/DL (ref 2.8–4.4)
GLUCOSE BLD-MCNC: 78 MG/DL (ref 70–99)
HBA1C MFR BLD: 6.3 % (ref ?–5.7)
HDLC SERPL-MCNC: 58 MG/DL (ref 40–59)
LDLC SERPL CALC-MCNC: 145 MG/DL (ref ?–100)
MICROALBUMIN UR-MCNC: 1.69 MG/DL
MICROALBUMIN/CREAT 24H UR-RTO: 14.7 UG/MG (ref ?–30)
NONHDLC SERPL-MCNC: 166 MG/DL (ref ?–130)
OSMOLALITY SERPL CALC.SUM OF ELEC: 292 MOSM/KG (ref 275–295)
POTASSIUM SERPL-SCNC: 4.2 MMOL/L (ref 3.5–5.1)
PROT SERPL-MCNC: 7.8 G/DL (ref 6.4–8.2)
SODIUM SERPL-SCNC: 141 MMOL/L (ref 136–145)
TRIGL SERPL-MCNC: 121 MG/DL (ref 30–149)
TSI SER-ACNC: 0.47 MIU/ML (ref 0.36–3.74)
VIT B12 SERPL-MCNC: 371 PG/ML (ref 193–986)
VIT D+METAB SERPL-MCNC: 32.7 NG/ML (ref 30–100)
VLDLC SERPL CALC-MCNC: 23 MG/DL (ref 0–30)

## 2022-02-27 PROCEDURE — 80053 COMPREHEN METABOLIC PANEL: CPT

## 2022-02-27 PROCEDURE — 85060 BLOOD SMEAR INTERPRETATION: CPT

## 2022-02-27 PROCEDURE — 82306 VITAMIN D 25 HYDROXY: CPT

## 2022-02-27 PROCEDURE — 85025 COMPLETE CBC W/AUTO DIFF WBC: CPT

## 2022-02-27 PROCEDURE — 82607 VITAMIN B-12: CPT

## 2022-02-27 PROCEDURE — 82570 ASSAY OF URINE CREATININE: CPT

## 2022-02-27 PROCEDURE — 80061 LIPID PANEL: CPT

## 2022-02-27 PROCEDURE — 84443 ASSAY THYROID STIM HORMONE: CPT

## 2022-02-27 PROCEDURE — 83036 HEMOGLOBIN GLYCOSYLATED A1C: CPT

## 2022-02-27 PROCEDURE — 82043 UR ALBUMIN QUANTITATIVE: CPT

## 2022-02-27 PROCEDURE — 36415 COLL VENOUS BLD VENIPUNCTURE: CPT

## 2022-02-27 PROCEDURE — 3061F NEG MICROALBUMINURIA REV: CPT | Performed by: FAMILY MEDICINE

## 2022-02-27 PROCEDURE — 3044F HG A1C LEVEL LT 7.0%: CPT | Performed by: FAMILY MEDICINE

## 2022-02-28 LAB
BASOPHILS # BLD AUTO: 0.03 X10(3) UL (ref 0–0.2)
BASOPHILS NFR BLD AUTO: 0.3 %
DEPRECATED RDW RBC AUTO: 41.1 FL (ref 35.1–46.3)
EOSINOPHIL # BLD AUTO: 0.02 X10(3) UL (ref 0–0.7)
EOSINOPHIL NFR BLD AUTO: 0.2 %
ERYTHROCYTE [DISTWIDTH] IN BLOOD BY AUTOMATED COUNT: 13.2 % (ref 11–15)
HCT VFR BLD AUTO: 48.7 %
HGB BLD-MCNC: 15.5 G/DL
IMM GRANULOCYTES # BLD AUTO: 0.02 X10(3) UL (ref 0–1)
IMM GRANULOCYTES NFR BLD: 0.2 %
LYMPHOCYTES # BLD AUTO: 2.72 X10(3) UL (ref 1–4)
LYMPHOCYTES NFR BLD AUTO: 31.1 %
MCH RBC QN AUTO: 27.1 PG (ref 26–34)
MCHC RBC AUTO-ENTMCNC: 31.8 G/DL (ref 31–37)
MCV RBC AUTO: 85.1 FL
MONOCYTES # BLD AUTO: 0.46 X10(3) UL (ref 0.1–1)
MONOCYTES NFR BLD AUTO: 5.3 %
NEUTROPHILS # BLD AUTO: 5.5 X10 (3) UL (ref 1.5–7.7)
NEUTROPHILS # BLD AUTO: 5.5 X10(3) UL (ref 1.5–7.7)
NEUTROPHILS NFR BLD AUTO: 62.9 %
PLATELET # BLD AUTO: 342 10(3)UL (ref 150–450)
RBC # BLD AUTO: 5.72 X10(6)UL
WBC # BLD AUTO: 8.8 X10(3) UL (ref 4–11)

## 2022-03-01 RX ORDER — CANAGLIFLOZIN 100 MG/1
100 TABLET, FILM COATED ORAL
Qty: 90 TABLET | Refills: 1 | Status: SHIPPED | OUTPATIENT
Start: 2022-03-01 | End: 2022-03-03

## 2022-03-01 NOTE — TELEPHONE ENCOUNTER
Refill passed per [address] [place] protocol.       Requested Prescriptions   Pending Prescriptions Disp Refills    Canagliflozin (INVOKANA) 100 MG Oral Tab 90 tablet 1     Sig: Take 100 mg by mouth before breakfast.        Diabetes Medication Protocol Passed - 2/28/2022  7:24 PM        Passed - Last A1C < 7.5 and within past 6 months     Lab Results   Component Value Date    A1C 6.3 (H) 02/27/2022               Passed - Appointment in past 6 or next 3 months        Passed - GFR Non- > 50     Lab Results   Component Value Date    GFRNAA 99 02/27/2022                 Passed - GFR in the past 12 months               Future Appointments         Provider Department Appt Notes    In 6 days Wilfredo Cantrell, [address], [place] [employer] P[employer]  NON SX F/U    In 2 weeks [address] [facility]; [facility] [address] None    In 2 months Bettie Mahmood MD [address], [address] [address]    In 5 months Anastaciagallito Boyd, [place] [facility] for [facility]vic [address] [facility] Urogynecology yearly             Recent Outpatient Visits              5 days ago Type 2 diabetes mellitus without retinopathy [facility])    [address] [place] [address], Ryan Sykes MD    Office Visit    1 month ago Type 2 diabetes mellitus without retinopathy [facility])    Edwar Koehler Loyola Shilling, APRN    Office Visit    2 months ago Mechanical low back pain    [address] [facility], Hannah Jaquez MD    Telemedicine    2 months ago Mechanical low back pain    [facility] Elicia Moore MD    Office Visit    3 months ago Mechanical low back pain    [address] [facility], [place]-[facility] Sourav Khan MD    Office Visit

## 2022-03-03 ENCOUNTER — TELEPHONE (OUTPATIENT)
Dept: FAMILY MEDICINE CLINIC | Facility: CLINIC | Age: 57
End: 2022-03-03

## 2022-03-03 ENCOUNTER — PATIENT MESSAGE (OUTPATIENT)
Dept: FAMILY MEDICINE CLINIC | Facility: CLINIC | Age: 57
End: 2022-03-03

## 2022-03-03 DIAGNOSIS — E78.5 HYPERLIPIDEMIA, UNSPECIFIED HYPERLIPIDEMIA TYPE: Primary | ICD-10-CM

## 2022-03-03 RX ORDER — ATORVASTATIN CALCIUM 10 MG/1
10 TABLET, FILM COATED ORAL NIGHTLY
Qty: 90 TABLET | Refills: 1 | Status: SHIPPED | OUTPATIENT
Start: 2022-03-03 | End: 2022-09-22

## 2022-03-03 RX ORDER — CANAGLIFLOZIN 100 MG/1
100 TABLET, FILM COATED ORAL
Qty: 90 TABLET | Refills: 1 | OUTPATIENT
Start: 2022-03-03

## 2022-03-03 RX ORDER — CANAGLIFLOZIN 100 MG/1
100 TABLET, FILM COATED ORAL
Qty: 90 TABLET | Refills: 1 | Status: SHIPPED | OUTPATIENT
Start: 2022-03-03

## 2022-03-03 NOTE — TELEPHONE ENCOUNTER
Your cholesterol is still quite high. I would like to treat it with atorvastatin 10 mg daily in the evenings and repeat lipid and liver panel in 3 months. If you are ok with that, I will send it in. Rest of the labs are stable. Diabetes is controlled with current medications - continue the same. - Dr. Gualberto Correa     Please advise on pended rx and lab orders. Pt is in agreement with starting this medication.

## 2022-03-03 NOTE — TELEPHONE ENCOUNTER
Prior authorization form has been filled out for invokana and faxed to [employer] to [phone] and [phone].  It can take 1-5 business days for a decision to come back

## 2022-03-03 NOTE — PROGRESS NOTES
[address] cholesterol is still quite high. I would like to treat it with atorvastatin 10 mg daily in the evenings and repeat lipid and liver panel in 3 months. If you are ok with that, I will send it in. Rest of the labs are stable.  Diabetes is controlled with current medications - continue the same. - Dr. Martinez Alva

## 2022-03-14 ENCOUNTER — OFFICE VISIT (OUTPATIENT)
Dept: SURGERY | Facility: CLINIC | Age: 57
End: 2022-03-14
Payer: COMMERCIAL

## 2022-03-14 VITALS
HEIGHT: 64.2 IN | DIASTOLIC BLOOD PRESSURE: 80 MMHG | SYSTOLIC BLOOD PRESSURE: 122 MMHG | BODY MASS INDEX: 34.4 KG/M2 | HEART RATE: 83 BPM | WEIGHT: 201.5 LBS | OXYGEN SATURATION: 98 %

## 2022-03-14 DIAGNOSIS — E11.9 TYPE 2 DIABETES MELLITUS WITHOUT RETINOPATHY (HCC): Primary | ICD-10-CM

## 2022-03-14 DIAGNOSIS — G47.33 OSA ON CPAP: ICD-10-CM

## 2022-03-14 DIAGNOSIS — E66.9 OBESITY (BMI 30-39.9): ICD-10-CM

## 2022-03-14 DIAGNOSIS — R12 HEART BURN: ICD-10-CM

## 2022-03-14 DIAGNOSIS — Z51.81 ENCOUNTER FOR THERAPEUTIC DRUG MONITORING: ICD-10-CM

## 2022-03-14 DIAGNOSIS — E78.00 HYPERCHOLESTEROLEMIA: ICD-10-CM

## 2022-03-14 DIAGNOSIS — E55.9 VITAMIN D DEFICIENCY: ICD-10-CM

## 2022-03-14 DIAGNOSIS — M54.50 LOW BACK PAIN, UNSPECIFIED BACK PAIN LATERALITY, UNSPECIFIED CHRONICITY, UNSPECIFIED WHETHER SCIATICA PRESENT: ICD-10-CM

## 2022-03-14 DIAGNOSIS — Z99.89 OSA ON CPAP: ICD-10-CM

## 2022-03-14 DIAGNOSIS — R53.83 FATIGUE, UNSPECIFIED TYPE: ICD-10-CM

## 2022-03-14 PROCEDURE — 99214 OFFICE O/P EST MOD 30 MIN: CPT | Performed by: NURSE PRACTITIONER

## 2022-03-14 PROCEDURE — 3074F SYST BP LT 130 MM HG: CPT | Performed by: NURSE PRACTITIONER

## 2022-03-14 PROCEDURE — 3008F BODY MASS INDEX DOCD: CPT | Performed by: NURSE PRACTITIONER

## 2022-03-14 PROCEDURE — 3079F DIAST BP 80-89 MM HG: CPT | Performed by: NURSE PRACTITIONER

## 2022-03-14 NOTE — PATIENT INSTRUCTIONS
Exercise Goals Reviewed and Discussed:    Afternoon walk 10-20 minutes- 3 days/week    Start tracking M[employer].

## 2022-03-17 ENCOUNTER — HOSPITAL ENCOUNTER (OUTPATIENT)
Dept: MAMMOGRAPHY | Age: 57
Discharge: HOME | End: 2022-03-17
Attending: FAMILY MEDICINE
Payer: COMMERCIAL

## 2022-03-17 DIAGNOSIS — Z12.31 VISIT FOR SCREENING MAMMOGRAM: ICD-10-CM

## 2022-03-17 PROCEDURE — 77067 SCR MAMMO BI INCL CAD: CPT | Performed by: FAMILY MEDICINE

## 2022-03-17 PROCEDURE — 77063 BREAST TOMOSYNTHESIS BI: CPT | Performed by: FAMILY MEDICINE

## 2022-03-29 ENCOUNTER — HOSPITAL ENCOUNTER (OUTPATIENT)
Dept: ULTRASOUND IMAGING | Facility: HOSPITAL | Age: 57
Discharge: HOME | End: 2022-03-29
Attending: FAMILY MEDICINE
Payer: COMMERCIAL

## 2022-03-29 ENCOUNTER — HOSPITAL ENCOUNTER (OUTPATIENT)
Dept: MAMMOGRAPHY | Facility: HOSPITAL | Age: 57
Discharge: HOME | End: 2022-03-29
Attending: FAMILY MEDICINE
Payer: COMMERCIAL

## 2022-03-29 DIAGNOSIS — R92.8 ABNORMAL MAMMOGRAM: ICD-10-CM

## 2022-03-29 PROCEDURE — 77061 BREAST TOMOSYNTHESIS UNI: CPT | Performed by: FAMILY MEDICINE

## 2022-03-29 PROCEDURE — 77065 DX MAMMO INCL CAD UNI: CPT | Performed by: FAMILY MEDICINE

## 2022-03-29 PROCEDURE — 76642 ULTRASOUND BREAST LIMITED: CPT | Performed by: FAMILY MEDICINE

## 2022-04-01 ENCOUNTER — HOSPITAL ENCOUNTER (OUTPATIENT)
Age: 57
Discharge: HOME | End: 2022-04-01
Attending: EMERGENCY MEDICINE
Payer: COMMERCIAL

## 2022-04-01 VITALS
SYSTOLIC BLOOD PRESSURE: 122 MMHG | TEMPERATURE: 98 F | RESPIRATION RATE: 18 BRPM | OXYGEN SATURATION: 100 % | DIASTOLIC BLOOD PRESSURE: 76 MMHG | HEART RATE: 89 BPM

## 2022-04-01 DIAGNOSIS — J02.9 VIRAL PHARYNGITIS: Primary | ICD-10-CM

## 2022-04-01 LAB
S PYO AG THROAT QL: NEGATIVE
SARS-COV-2 RNA RESP QL NAA+PROBE: NOT DETECTED

## 2022-04-01 PROCEDURE — 99213 OFFICE O/P EST LOW 20 MIN: CPT

## 2022-04-01 PROCEDURE — 87880 STREP A ASSAY W/OPTIC: CPT

## 2022-04-01 PROCEDURE — 99212 OFFICE O/P EST SF 10 MIN: CPT

## 2022-04-18 RX ORDER — PHENTERMINE HYDROCHLORIDE 37.5 MG/1
TABLET ORAL
Qty: 30 TABLET | Refills: 0 | Status: SHIPPED | OUTPATIENT
Start: 2022-04-18

## 2022-04-19 ENCOUNTER — OFFICE VISIT (OUTPATIENT)
Dept: SURGERY | Facility: CLINIC | Age: 57
End: 2022-04-19
Payer: COMMERCIAL

## 2022-04-19 VITALS — BODY MASS INDEX: 34.34 KG/M2 | WEIGHT: 201.13 LBS | HEIGHT: 64.2 IN

## 2022-04-19 DIAGNOSIS — E11.9 TYPE 2 DIABETES MELLITUS WITHOUT RETINOPATHY (HCC): ICD-10-CM

## 2022-04-19 DIAGNOSIS — E55.9 VITAMIN D DEFICIENCY: ICD-10-CM

## 2022-04-19 DIAGNOSIS — E66.9 OBESITY (BMI 30-39.9): ICD-10-CM

## 2022-04-19 PROCEDURE — 3008F BODY MASS INDEX DOCD: CPT

## 2022-04-19 PROCEDURE — 97802 MEDICAL NUTRITION INDIV IN: CPT

## 2022-04-22 ENCOUNTER — TELEPHONE (OUTPATIENT)
Dept: FAMILY MEDICINE CLINIC | Facility: CLINIC | Age: 57
End: 2022-04-22

## 2022-04-22 NOTE — TELEPHONE ENCOUNTER
Patient is requesting a sooner appointment than the one she has scheduled for her physical on 5/10. Patient is not able to start her new job until she has her physical and a form completed. Please advise.  Thank you

## 2022-04-25 NOTE — TELEPHONE ENCOUNTER
Spoke, with the patient and she did schedule a physical with Dr. Dior Liang for tomorrow 4-26-22 at 2:00 a res 24 was used. This was approved per the doctors message below. This was the only res 24 for this week.

## 2022-04-26 ENCOUNTER — OFFICE VISIT (OUTPATIENT)
Dept: FAMILY MEDICINE CLINIC | Facility: CLINIC | Age: 57
End: 2022-04-26
Payer: COMMERCIAL

## 2022-04-26 VITALS
BODY MASS INDEX: 34.01 KG/M2 | HEIGHT: 64.2 IN | TEMPERATURE: 98 F | SYSTOLIC BLOOD PRESSURE: 134 MMHG | DIASTOLIC BLOOD PRESSURE: 79 MMHG | WEIGHT: 199.19 LBS | HEART RATE: 95 BPM

## 2022-04-26 DIAGNOSIS — Z00.00 ROUTINE MEDICAL EXAM: ICD-10-CM

## 2022-04-26 DIAGNOSIS — E78.5 HYPERLIPIDEMIA, UNSPECIFIED HYPERLIPIDEMIA TYPE: ICD-10-CM

## 2022-04-26 DIAGNOSIS — E11.9 TYPE 2 DIABETES MELLITUS WITHOUT RETINOPATHY (HCC): Primary | ICD-10-CM

## 2022-04-26 PROCEDURE — 3008F BODY MASS INDEX DOCD: CPT | Performed by: FAMILY MEDICINE

## 2022-04-26 PROCEDURE — 3075F SYST BP GE 130 - 139MM HG: CPT | Performed by: FAMILY MEDICINE

## 2022-04-26 PROCEDURE — 99396 PREV VISIT EST AGE 40-64: CPT | Performed by: FAMILY MEDICINE

## 2022-04-26 PROCEDURE — 3078F DIAST BP <80 MM HG: CPT | Performed by: FAMILY MEDICINE

## 2022-04-26 PROCEDURE — 86580 TB INTRADERMAL TEST: CPT | Performed by: FAMILY MEDICINE

## 2022-04-28 ENCOUNTER — NURSE ONLY (OUTPATIENT)
Dept: FAMILY MEDICINE CLINIC | Facility: CLINIC | Age: 57
End: 2022-04-28
Payer: COMMERCIAL

## 2022-04-28 DIAGNOSIS — Z11.1 ENCOUNTER FOR PPD SKIN TEST READING: Primary | ICD-10-CM

## 2022-04-28 LAB — INDURATION (): 0 MM (ref 0–11)

## 2022-04-28 NOTE — PROGRESS NOTES
Patient is here for a ppd reading. I veirifed full name, and reason for nurse visit. Test read as negative with 0.0mm induration.

## 2022-05-13 RX ORDER — FLUTICASONE PROPIONATE 50 MCG
2 SPRAY, SUSPENSION (ML) NASAL DAILY
Qty: 3 EACH | Refills: 0 | Status: SHIPPED | OUTPATIENT
Start: 2022-05-13 | End: 2022-08-18

## 2022-05-13 NOTE — TELEPHONE ENCOUNTER
Refill passed per [facility] protocol. Requested Prescriptions   Pending Prescriptions Disp Refills    fluticasone propionate 50 MCG/ACT Nasal Suspension  0     Si sprays by Nasal route daily. Allergy Medication Protocol Passed - 2022  7:19 PM        Passed - Appointment in past 12 or next 3 months              Future Appointments         Provider Department Appt Notes    In 2 months Michael Oropeza, [address] [facility], [address]     In 3 months Marianne Molina, [place] [facility] for Pelvic [address] [place] [address] Urogynecology yearly             Recent Outpatient Visits              2 weeks ago Encounter for PPD skin test reading    [address]    Nurse Only    2 weeks ago Type 2 diabetes mellitus without retinopathy [facility])    [address] Karan [address] MD    Office Visit    3 weeks ago Obesity (BMI 30-39. 5)    Abisai Ramirez, [place], [facility], [facility]    Office Visit    2 months ago Type 2 diabetes mellitus without retinopathy [facility])    [address] [facility], [address], [place], [place] Reasons, APRN    Office Visit    2 months ago Type 2 diabetes mellitus without retinopathy [facility])    [address], Mitesh Mclean MD    Office Visit

## 2022-06-01 RX ORDER — PHENTERMINE HYDROCHLORIDE 37.5 MG/1
TABLET ORAL
Qty: 30 TABLET | Refills: 0 | Status: SHIPPED | OUTPATIENT
Start: 2022-06-01

## 2022-06-10 ENCOUNTER — HOSPITAL ENCOUNTER (OUTPATIENT)
Dept: GENERAL RADIOLOGY | Age: 57
Discharge: HOME | End: 2022-06-10
Attending: NURSE PRACTITIONER
Payer: COMMERCIAL

## 2022-06-10 ENCOUNTER — OFFICE VISIT (OUTPATIENT)
Dept: FAMILY MEDICINE CLINIC | Facility: CLINIC | Age: 57
End: 2022-06-10
Payer: COMMERCIAL

## 2022-06-10 VITALS
TEMPERATURE: 97 F | DIASTOLIC BLOOD PRESSURE: 77 MMHG | SYSTOLIC BLOOD PRESSURE: 118 MMHG | HEART RATE: 86 BPM | HEIGHT: 64.2 IN | WEIGHT: 197 LBS | BODY MASS INDEX: 33.63 KG/M2

## 2022-06-10 DIAGNOSIS — R05.9 COUGH: ICD-10-CM

## 2022-06-10 DIAGNOSIS — B34.9 VIRAL SYNDROME: ICD-10-CM

## 2022-06-10 DIAGNOSIS — R09.89 CHEST CONGESTION: ICD-10-CM

## 2022-06-10 DIAGNOSIS — B34.9 VIRAL SYNDROME: Primary | ICD-10-CM

## 2022-06-10 LAB — SARS-COV-2 RNA RESP QL NAA+PROBE: NOT DETECTED

## 2022-06-10 PROCEDURE — 3008F BODY MASS INDEX DOCD: CPT | Performed by: NURSE PRACTITIONER

## 2022-06-10 PROCEDURE — 3074F SYST BP LT 130 MM HG: CPT | Performed by: NURSE PRACTITIONER

## 2022-06-10 PROCEDURE — 71046 X-RAY EXAM CHEST 2 VIEWS: CPT | Performed by: NURSE PRACTITIONER

## 2022-06-10 PROCEDURE — 99214 OFFICE O/P EST MOD 30 MIN: CPT | Performed by: NURSE PRACTITIONER

## 2022-06-10 PROCEDURE — 3078F DIAST BP <80 MM HG: CPT | Performed by: NURSE PRACTITIONER

## 2022-06-21 ENCOUNTER — OFFICE VISIT (OUTPATIENT)
Dept: FAMILY MEDICINE CLINIC | Facility: CLINIC | Age: 57
End: 2022-06-21
Payer: COMMERCIAL

## 2022-06-21 VITALS
WEIGHT: 195.63 LBS | BODY MASS INDEX: 33.4 KG/M2 | DIASTOLIC BLOOD PRESSURE: 87 MMHG | HEIGHT: 64.2 IN | SYSTOLIC BLOOD PRESSURE: 126 MMHG | HEART RATE: 97 BPM | TEMPERATURE: 98 F

## 2022-06-21 DIAGNOSIS — R05.9 COUGH: Primary | ICD-10-CM

## 2022-06-21 PROCEDURE — 99213 OFFICE O/P EST LOW 20 MIN: CPT | Performed by: FAMILY MEDICINE

## 2022-06-21 PROCEDURE — 3079F DIAST BP 80-89 MM HG: CPT | Performed by: FAMILY MEDICINE

## 2022-06-21 PROCEDURE — 3008F BODY MASS INDEX DOCD: CPT | Performed by: FAMILY MEDICINE

## 2022-06-21 PROCEDURE — 3074F SYST BP LT 130 MM HG: CPT | Performed by: FAMILY MEDICINE

## 2022-06-21 RX ORDER — LEVOCETIRIZINE DIHYDROCHLORIDE 5 MG/1
5 TABLET, FILM COATED ORAL EVERY EVENING
Qty: 90 TABLET | Refills: 0 | Status: SHIPPED | OUTPATIENT
Start: 2022-06-21

## 2022-07-19 ENCOUNTER — OFFICE VISIT (OUTPATIENT)
Dept: SURGERY | Facility: CLINIC | Age: 57
End: 2022-07-19
Payer: COMMERCIAL

## 2022-07-19 VITALS — BODY MASS INDEX: 33.17 KG/M2 | HEIGHT: 64.2 IN | WEIGHT: 194.31 LBS

## 2022-07-19 DIAGNOSIS — M54.50 LOW BACK PAIN, UNSPECIFIED BACK PAIN LATERALITY, UNSPECIFIED CHRONICITY, UNSPECIFIED WHETHER SCIATICA PRESENT: ICD-10-CM

## 2022-07-19 DIAGNOSIS — E78.00 HYPERCHOLESTEROLEMIA: ICD-10-CM

## 2022-07-19 DIAGNOSIS — Z51.81 ENCOUNTER FOR THERAPEUTIC DRUG MONITORING: ICD-10-CM

## 2022-07-19 DIAGNOSIS — Z99.89 OSA ON CPAP: ICD-10-CM

## 2022-07-19 DIAGNOSIS — E66.9 OBESITY (BMI 30-39.9): ICD-10-CM

## 2022-07-19 DIAGNOSIS — G47.33 OSA ON CPAP: ICD-10-CM

## 2022-07-19 DIAGNOSIS — E11.9 TYPE 2 DIABETES MELLITUS WITHOUT RETINOPATHY (HCC): ICD-10-CM

## 2022-07-19 DIAGNOSIS — R12 HEART BURN: ICD-10-CM

## 2022-07-19 DIAGNOSIS — R63.5 WEIGHT GAIN: ICD-10-CM

## 2022-07-19 DIAGNOSIS — R53.83 FATIGUE, UNSPECIFIED TYPE: ICD-10-CM

## 2022-07-19 DIAGNOSIS — E55.9 VITAMIN D DEFICIENCY: ICD-10-CM

## 2022-07-19 PROCEDURE — 3008F BODY MASS INDEX DOCD: CPT

## 2022-07-19 PROCEDURE — 97803 MED NUTRITION INDIV SUBSEQ: CPT

## 2022-07-22 ENCOUNTER — OFFICE VISIT (OUTPATIENT)
Dept: SURGERY | Facility: CLINIC | Age: 57
End: 2022-07-22
Payer: COMMERCIAL

## 2022-07-22 VITALS
HEIGHT: 64.2 IN | WEIGHT: 191 LBS | HEART RATE: 79 BPM | BODY MASS INDEX: 32.61 KG/M2 | DIASTOLIC BLOOD PRESSURE: 70 MMHG | OXYGEN SATURATION: 97 % | SYSTOLIC BLOOD PRESSURE: 102 MMHG

## 2022-07-22 DIAGNOSIS — E11.9 TYPE 2 DIABETES MELLITUS WITHOUT RETINOPATHY (HCC): ICD-10-CM

## 2022-07-22 DIAGNOSIS — M54.50 LOW BACK PAIN, UNSPECIFIED BACK PAIN LATERALITY, UNSPECIFIED CHRONICITY, UNSPECIFIED WHETHER SCIATICA PRESENT: ICD-10-CM

## 2022-07-22 DIAGNOSIS — E78.00 HYPERCHOLESTEROLEMIA: Primary | ICD-10-CM

## 2022-07-22 DIAGNOSIS — R53.83 FATIGUE, UNSPECIFIED TYPE: ICD-10-CM

## 2022-07-22 DIAGNOSIS — Z99.89 OSA ON CPAP: ICD-10-CM

## 2022-07-22 DIAGNOSIS — E66.9 OBESITY (BMI 30-39.9): ICD-10-CM

## 2022-07-22 DIAGNOSIS — R63.5 WEIGHT GAIN: ICD-10-CM

## 2022-07-22 DIAGNOSIS — E55.9 VITAMIN D DEFICIENCY: ICD-10-CM

## 2022-07-22 DIAGNOSIS — Z51.81 ENCOUNTER FOR THERAPEUTIC DRUG MONITORING: ICD-10-CM

## 2022-07-22 DIAGNOSIS — G47.33 OSA ON CPAP: ICD-10-CM

## 2022-07-22 DIAGNOSIS — R12 HEART BURN: ICD-10-CM

## 2022-07-22 PROCEDURE — 99214 OFFICE O/P EST MOD 30 MIN: CPT | Performed by: NURSE PRACTITIONER

## 2022-07-22 PROCEDURE — 3078F DIAST BP <80 MM HG: CPT | Performed by: NURSE PRACTITIONER

## 2022-07-22 PROCEDURE — 3008F BODY MASS INDEX DOCD: CPT | Performed by: NURSE PRACTITIONER

## 2022-07-22 PROCEDURE — 3074F SYST BP LT 130 MM HG: CPT | Performed by: NURSE PRACTITIONER

## 2022-07-22 RX ORDER — PHENTERMINE HYDROCHLORIDE 37.5 MG/1
37.5 TABLET ORAL
Qty: 30 TABLET | Refills: 2 | Status: SHIPPED | OUTPATIENT
Start: 2022-07-22

## 2022-08-04 ENCOUNTER — HOSPITAL ENCOUNTER (OUTPATIENT)
Age: 57
Discharge: HOME | End: 2022-08-04
Attending: EMERGENCY MEDICINE
Payer: COMMERCIAL

## 2022-08-04 VITALS
RESPIRATION RATE: 16 BRPM | OXYGEN SATURATION: 98 % | SYSTOLIC BLOOD PRESSURE: 139 MMHG | DIASTOLIC BLOOD PRESSURE: 66 MMHG | HEART RATE: 97 BPM | HEIGHT: 65 IN | TEMPERATURE: 98 F | BODY MASS INDEX: 31.49 KG/M2 | WEIGHT: 189 LBS

## 2022-08-04 DIAGNOSIS — R81 GLYCOSURIA: ICD-10-CM

## 2022-08-04 DIAGNOSIS — N30.01 ACUTE CYSTITIS WITH HEMATURIA: Primary | ICD-10-CM

## 2022-08-04 LAB
BILIRUB UR QL STRIP: NEGATIVE
COLOR UR: YELLOW
GLUCOSE BLDC GLUCOMTR-MCNC: 87 MG/DL (ref 70–99)
GLUCOSE UR STRIP-MCNC: 500 MG/DL
KETONES UR STRIP-MCNC: NEGATIVE MG/DL
NITRITE UR QL STRIP: NEGATIVE
PH UR STRIP: 6.5 [PH]
PROT UR STRIP-MCNC: 100 MG/DL
SP GR UR STRIP: 1.02
UROBILINOGEN UR STRIP-ACNC: <2 MG/DL

## 2022-08-04 PROCEDURE — 99214 OFFICE O/P EST MOD 30 MIN: CPT

## 2022-08-04 PROCEDURE — 87086 URINE CULTURE/COLONY COUNT: CPT | Performed by: EMERGENCY MEDICINE

## 2022-08-04 PROCEDURE — 82962 GLUCOSE BLOOD TEST: CPT

## 2022-08-04 PROCEDURE — 87088 URINE BACTERIA CULTURE: CPT | Performed by: EMERGENCY MEDICINE

## 2022-08-04 PROCEDURE — 87186 SC STD MICRODIL/AGAR DIL: CPT | Performed by: EMERGENCY MEDICINE

## 2022-08-04 PROCEDURE — 81002 URINALYSIS NONAUTO W/O SCOPE: CPT

## 2022-08-04 RX ORDER — CEPHALEXIN 500 MG/1
500 CAPSULE ORAL 3 TIMES DAILY
Qty: 21 CAPSULE | Refills: 0 | Status: SHIPPED | OUTPATIENT
Start: 2022-08-04 | End: 2022-08-11

## 2022-08-04 NOTE — ED INITIAL ASSESSMENT (HPI)
Pt presents to the IC with c/o burning with urination since 12a, coupled with hematuria that started 2 hours ago.

## 2022-08-18 RX ORDER — FLUTICASONE PROPIONATE 50 MCG
SPRAY, SUSPENSION (ML) NASAL
Qty: 48 G | Refills: 0 | Status: SHIPPED | OUTPATIENT
Start: 2022-08-18

## 2022-08-19 ENCOUNTER — OFFICE VISIT (OUTPATIENT)
Dept: UROLOGY | Facility: HOSPITAL | Age: 57
End: 2022-08-19
Attending: OBSTETRICS & GYNECOLOGY
Payer: COMMERCIAL

## 2022-08-19 VITALS
BODY MASS INDEX: 31.49 KG/M2 | TEMPERATURE: 98 F | SYSTOLIC BLOOD PRESSURE: 124 MMHG | DIASTOLIC BLOOD PRESSURE: 72 MMHG | HEIGHT: 65 IN | WEIGHT: 189 LBS

## 2022-08-19 DIAGNOSIS — N81.84 PELVIC MUSCLE WASTING: ICD-10-CM

## 2022-08-19 DIAGNOSIS — Z98.890 POST-OPERATIVE STATE: ICD-10-CM

## 2022-08-19 DIAGNOSIS — N95.2 POSTMENOPAUSAL ATROPHIC VAGINITIS: Primary | ICD-10-CM

## 2022-08-19 PROCEDURE — 99212 OFFICE O/P EST SF 10 MIN: CPT

## 2022-09-15 RX ORDER — LEVOCETIRIZINE DIHYDROCHLORIDE 5 MG/1
5 TABLET, FILM COATED ORAL EVERY EVENING
Qty: 90 TABLET | Refills: 1 | Status: SHIPPED | OUTPATIENT
Start: 2022-09-15

## 2022-09-15 NOTE — TELEPHONE ENCOUNTER
Refill passed per [employer] protocol.    Requested Prescriptions   Pending Prescriptions Disp Refills    LEVOCETIRIZINE 5 MG Oral Tab [Pharmacy Med Name: LEVOCETIRIZINE 5MG TABLETS] 90 tablet 0     Sig: TAKE 1 TABLET(5 MG) BY MOUTH EVERY EVENING        Allergy Medication Protocol Passed - 9/15/2022 11:19 AM        Passed - In person appointment or virtual visit in the past 12 mos or appointment in next 3 mos       Recent Outpatient Visits              3 weeks ago Postmenopausal atrophic vaginitis    [facility] [address] [facility] Urogynecology Jenny Craig,     Office Visit    1 month ago Hypercholesterolemia    [address] [facility],[address] [place] [address], [place], Clawson Leatha, APRN    Office Visit    1 month ago Weight gain    [address]    Office Visit    2 months ago [employer] [place], [facility], Anel Greco, Jose A Sanchez MD    Office Visit    3 months ago Viral syndrome    [employer] [facility], [address] [place] [address] Krista Iraheta, LADARIUS    Office Visit     Future Appointments         Provider Department Appt Notes    In 3 weeks Andres Anderson, [address] [facility], [address]     In 1 month Connor Hamm, [address] [facility], [address], [facility] [place] [facility] Visits              3 weeks ago Postmenopausal atrophic vaginitis    [facility] Pelvic [address] [facility] Urogynecology Jenny Craig,     Office Visit    1 month ago Hypercholesterolemia    [address] [facility],[address] [place] [address], [place], Clawson Leatha, APRN    Office Visit    1 month ago Weight gain    [address] [place]    Office Visit    2 months ago Cough    [employer] [place], [facility], Anel Greco, Jose A Sanchez MD    Office Visit    3 months ago Viral syndrome    [employer] [place], [facility], Anel Greco, [address] [place] [address] Dr Pasquale Mortimer, [address]    Office Visit           Future Appointments         Provider Department Appt Notes    In 3 weeks Jovita Mckinney, [address] [facility], [address], [place]     In 1 month Venecia Leach, [address] [facility], [address] [facility]

## 2022-09-21 NOTE — TELEPHONE ENCOUNTER
Please review. Protocol failed/ No protocol      Requested Prescriptions   Pending Prescriptions Disp Refills    ATORVASTATIN 10 MG Oral Tab [Pharmacy Med Name: ATORVASTATIN 10MG TABLETS] 90 tablet 1     Sig: TAKE 1 TABLET(10 MG) BY MOUTH EVERY NIGHT        Cholesterol Medication Protocol Failed - 9/21/2022  5:41 AM        Failed - Last LDL < 130     Lab Results   Component Value Date     (H) 02/27/2022               Passed - ALT in past 12 months        Passed - LDL in past 12 months        Passed - Last ALT < 80       Lab Results   Component Value Date    ALT 24 02/27/2022             Passed - In person appointment or virtual visit in the past 12 mos or appointment in next 3 mos       Recent Outpatient Visits              1 month ago Postmenopausal atrophic vaginitis    [facility] [place] for [facility]vic [address] [facility] Severo Lights, DO    Office Visit    2 months ago Hypercholesterolemia    [address] [facility], [place] APRN    Office Visit    2 months ago Weight gain    [address] [facility], [place], Paul Hernandez RD    Office Visit    3 months ago Cough    Mendel Rollins, Tawny Leigh MD    Office Visit    3 months ago Viral syndrome    Mendel Rollins, [address] [place] [address] Zoran Iraheta APRN    Office Visit     Future Appointments         Provider Department Appt Notes    In 3 weeks Clyde Constable, [address], [address], [address]     In 4 weeks Jones Clonts, [address], [address], [employer]         Provider Department Appt Notes    In 3 weeks Clyde Constable, [address], [address] [facility] [address]     In 4 weeks Jones Clonts, [address], [address], [address] Visits              1 month ago Postmenopausal atrophic vaginitis [facility] [address] [facility] Urogynecology Aria Friedman DO    Office Visit    2 months ago Hypercholesterolemia    [address] [facility],2Nd Floor, Diamond Vann APRN    Office Visit    2 months ago Weight gain    [address] [facility],2[address] Floor, [place], Genie Womack    Office Visit    3 months ago Cough    [address] [place], [facility] [address], Krista Mims MD    Office Visit    3 months ago Viral syndrome    [address] Ce Iraheta, [address]    Office Visit

## 2022-09-22 RX ORDER — ATORVASTATIN CALCIUM 10 MG/1
TABLET, FILM COATED ORAL
Qty: 90 TABLET | Refills: 1 | Status: SHIPPED | OUTPATIENT
Start: 2022-09-22

## 2022-09-24 DIAGNOSIS — S16.1XXA NECK STRAIN: ICD-10-CM

## 2022-09-24 NOTE — TELEPHONE ENCOUNTER
Please review. Protocol failed/ No protocol      Requested Prescriptions   Pending Prescriptions Disp Refills    CYCLOBENZAPRINE 10 MG Oral Tab [Pharmacy Med Name: CYCLOBENZAPRINE 10MG TABLETS] 40 tablet 0     Sig: TAKE 1 TABLET(10 MG) BY MOUTH EVERY NIGHT AS NEEDED        There is no refill protocol information for this order           Future Appointments         Provider Department Appt Notes    In 2 weeks Angel Ban, [address] [facility], [address] [facility] [address]     In 3 weeks Pjrijohn Hollis, [address] [facility], [address], [place]              Recent Outpatient Visits              1 month ago Postmenopausal atrophic vaginitis    [facility] for Pelvic [address] [place] [address] Urogynecology Aster Gray DO    Office Visit    2 months ago Hypercholesterolemia    [address] [facility], [address], [place] white , APRN    Office Visit    2 months ago Weight gain    [address] [facility],2Nd Floor, [facility] Son, Jessica    Office Visit    3 months ago Cough    [address], [address], Mateo Eldridge MD    Office Visit    3 months ago Viral syndrome    [address] Amanda Iraheta, APRN    Office Visit

## 2022-09-25 RX ORDER — CYCLOBENZAPRINE HCL 10 MG
TABLET ORAL
Qty: 40 TABLET | Refills: 0 | Status: SHIPPED | OUTPATIENT
Start: 2022-09-25

## 2022-09-29 RX ORDER — PHENTERMINE HYDROCHLORIDE 37.5 MG/1
TABLET ORAL
Qty: 30 TABLET | Refills: 0 | Status: SHIPPED | OUTPATIENT
Start: 2022-09-29

## 2022-09-29 RX ORDER — PHENTERMINE HYDROCHLORIDE 37.5 MG/1
37.5 TABLET ORAL
Qty: 30 TABLET | Refills: 2 | OUTPATIENT
Start: 2022-09-29

## 2022-10-03 RX ORDER — PHENTERMINE HYDROCHLORIDE 37.5 MG/1
37.5 TABLET ORAL
Qty: 30 TABLET | Refills: 2 | OUTPATIENT
Start: 2022-10-03

## 2022-10-12 ENCOUNTER — HOSPITAL ENCOUNTER (OUTPATIENT)
Dept: GENERAL RADIOLOGY | Age: 57
Discharge: HOME | End: 2022-10-12
Attending: FAMILY MEDICINE
Payer: COMMERCIAL

## 2022-10-12 ENCOUNTER — OFFICE VISIT (OUTPATIENT)
Dept: FAMILY MEDICINE CLINIC | Facility: CLINIC | Age: 57
End: 2022-10-12
Payer: COMMERCIAL

## 2022-10-12 VITALS
SYSTOLIC BLOOD PRESSURE: 128 MMHG | BODY MASS INDEX: 31.16 KG/M2 | HEART RATE: 97 BPM | WEIGHT: 187 LBS | HEIGHT: 65 IN | DIASTOLIC BLOOD PRESSURE: 87 MMHG | TEMPERATURE: 97 F

## 2022-10-12 DIAGNOSIS — M25.562 ACUTE PAIN OF LEFT KNEE: ICD-10-CM

## 2022-10-12 DIAGNOSIS — E11.9 TYPE 2 DIABETES MELLITUS WITHOUT RETINOPATHY (HCC): Primary | ICD-10-CM

## 2022-10-12 LAB
CARTRIDGE LOT#: ABNORMAL NUMERIC
HEMOGLOBIN A1C: 6.1 % (ref 4.3–5.6)

## 2022-10-12 PROCEDURE — 3008F BODY MASS INDEX DOCD: CPT | Performed by: FAMILY MEDICINE

## 2022-10-12 PROCEDURE — 83036 HEMOGLOBIN GLYCOSYLATED A1C: CPT | Performed by: FAMILY MEDICINE

## 2022-10-12 PROCEDURE — 73562 X-RAY EXAM OF KNEE 3: CPT | Performed by: FAMILY MEDICINE

## 2022-10-12 PROCEDURE — 3079F DIAST BP 80-89 MM HG: CPT | Performed by: FAMILY MEDICINE

## 2022-10-12 PROCEDURE — 99214 OFFICE O/P EST MOD 30 MIN: CPT | Performed by: FAMILY MEDICINE

## 2022-10-12 PROCEDURE — 3044F HG A1C LEVEL LT 7.0%: CPT | Performed by: FAMILY MEDICINE

## 2022-10-12 PROCEDURE — 3074F SYST BP LT 130 MM HG: CPT | Performed by: FAMILY MEDICINE

## 2022-10-12 RX ORDER — NAPROXEN 500 MG/1
500 TABLET ORAL 2 TIMES DAILY WITH MEALS
Qty: 20 TABLET | Refills: 0 | Status: SHIPPED | OUTPATIENT
Start: 2022-10-12

## 2022-10-13 NOTE — PROGRESS NOTES
[address] looks normal - no sign of arthritis. May just be tendonitis/mild inflammation as discussed.  Continue treatment as discussed. - Dr. Phoebe Coello

## 2022-10-24 RX ORDER — CHOLECALCIFEROL (VITAMIN D3) 1250 MCG
1 CAPSULE ORAL WEEKLY
Qty: 12 CAPSULE | Refills: 4 | Status: SHIPPED | OUTPATIENT
Start: 2022-10-24

## 2022-10-24 NOTE — TELEPHONE ENCOUNTER
Please review. Protocol failed / No protocol.     Requested Prescriptions   Pending Prescriptions Disp Refills    VITAMIN D3 1.25 MG (50750 UT) Oral Cap [Pharmacy Med Name: VITAMIN D3 50,000 IU (BRYAN) CAP] 12 capsule 4     Sig: TAKE 1 CAPSULE BY MOUTH ONCE A WEEK        There is no refill protocol information for this order           Recent Outpatient Visits              1 week ago Type 2 diabetes mellitus without retinopathy [facility])    [facility], [facility], [address], Iris Hdz MD    Office Visit    2 months ago Postmenopausal atrophic vaginitis    [facility] Norma Cain DO    Office Visit    3 months ago Hypercholesterolemia    [address] [facility],[address], Segundo Vann APRN    Office Visit    3 months ago Weight gain    [address] [facility],[address], [facility] Anatoliy bailey, HARRIETT    Office Visit    4 months ago Robert Lopez Lita Adolf, MD    Office Visit

## 2022-11-07 RX ORDER — PHENTERMINE HYDROCHLORIDE 37.5 MG/1
37.5 TABLET ORAL
Qty: 30 TABLET | Refills: 0 | Status: SHIPPED | OUTPATIENT
Start: 2022-11-07

## 2022-11-08 ENCOUNTER — PATIENT MESSAGE (OUTPATIENT)
Dept: FAMILY MEDICINE CLINIC | Facility: CLINIC | Age: 57
End: 2022-11-08

## 2022-11-09 RX ORDER — PANTOPRAZOLE SODIUM 40 MG/1
40 TABLET, DELAYED RELEASE ORAL
Qty: 90 TABLET | Refills: 1 | Status: SHIPPED | OUTPATIENT
Start: 2022-11-09

## 2022-11-09 NOTE — TELEPHONE ENCOUNTER
From: Kailash Pena  To: Milton Calderon MD  Sent: 11/8/2022 5:20 PM CST  Subject: Refill pantoprazole 40 MG Martha Jurado to bother you but I need a Refill for   pantoprazole 40 MG Tbec  Thank You

## 2022-11-09 NOTE — TELEPHONE ENCOUNTER
Routed to Dr Soham San for advise, thanks.   Last ref 8-5-22 by Mirella Melo MD    Refill passed per [facility] protocol   Requested Prescriptions   Pending Prescriptions Disp Refills    pantoprazole 40 MG Oral Tab EC 90 tablet 1     Sig: Take 1 tablet (40 mg total) by mouth every morning before breakfast.       Gastrointestional Medication Protocol Passed - 11/8/2022  9:18 PM        Passed - In person appointment or virtual visit in the past 12 mos or appointment in next 3 mos     Recent Outpatient Visits              3 weeks ago Type 2 diabetes mellitus without retinopathy [facility])    Mendel Rollins, Tawny Leigh MD    Office Visit    2 months ago Postmenopausal atrophic vaginitis    [facility] Severo Geovani, DO    Office Visit    3 months ago [facility]cholesterolemia    [facility] [facility]    Office Visit    3 months ago Weight gain    [address] [facility], [address] [facility] [address]    Office Visit    4 months ago Nicola Hubbard, Ricardo Negrete MD    Office Visit

## 2022-11-15 ENCOUNTER — HOSPITAL ENCOUNTER (OUTPATIENT)
Age: 57
Discharge: HOME | End: 2022-11-15
Payer: COMMERCIAL

## 2022-11-15 VITALS
SYSTOLIC BLOOD PRESSURE: 132 MMHG | RESPIRATION RATE: 18 BRPM | TEMPERATURE: 98 F | HEART RATE: 93 BPM | OXYGEN SATURATION: 97 % | DIASTOLIC BLOOD PRESSURE: 83 MMHG

## 2022-11-15 DIAGNOSIS — J02.9 VIRAL PHARYNGITIS: Primary | ICD-10-CM

## 2022-11-15 LAB
S PYO AG THROAT QL: NEGATIVE
SARS-COV-2 RNA RESP QL NAA+PROBE: NOT DETECTED

## 2022-11-15 PROCEDURE — U0002 COVID-19 LAB TEST NON-CDC: HCPCS | Performed by: NURSE PRACTITIONER

## 2022-11-15 PROCEDURE — 87880 STREP A ASSAY W/OPTIC: CPT | Performed by: NURSE PRACTITIONER

## 2022-11-15 PROCEDURE — 99213 OFFICE O/P EST LOW 20 MIN: CPT | Performed by: NURSE PRACTITIONER

## 2023-01-15 NOTE — TELEPHONE ENCOUNTER
Refill passed per [facility] protocol. Dr. Audrey Saldaña, NSAID- please review. Thank you. Fernando Turk   Requested Prescriptions   Pending Prescriptions Disp Refills    NAPROXEN 500 MG Oral Tab [Pharmacy Med Name: NAPROXEN 500MG TABLETS] 20 tablet 0     Sig: TAKE 1 TABLET(500 MG) BY MOUTH TWICE DAILY WITH MEALS       Non-Narcotic Pain Medication Protocol Passed - 1/15/2023 10:35 AM        Passed - In person appointment or virtual visit in the past 6 mos or appointment in next 3 mos     Recent Outpatient Visits              3 months ago Type 2 diabetes mellitus without retinopathy [facility])    [facility], Anel Greco, Karan Goss MD    Office Visit    4 months ago Postmenopausal atrophic vaginitis    [facility] [address] [facility] Marianne Molina, DO    Office Visit    5 months ago Hypercholesterolemia    [address], [address], [place], [place] Reasons, APRN    Office Visit    6 months ago Weight gain    [address] [facility],[address] [place] [address], [place], [facility], RD    Office Visit    6 months ago Cough    [facility], Anel Greco, Karan Goss MD    Office Visit          Future Appointments         Provider Department Appt Notes    In 3 days Ant Vazquez, [address], [address], [place]                     Recent Outpatient Visits              3 months ago Type 2 diabetes mellitus without retinopathy [facility])    [place] [facility], Anel Greco, Karan Goss MD    Office Visit    4 months ago Postmenopausal atrophic vaginitis    [facility] [address] [facility]ro[facility]cology Marianne Molina, DO    Office Visit    5 months ago Hypercholesterolemia    [address] [facility],[address] [place] [address], [place], [place] Reasons, APRN    Office Visit    6 months ago Weight gain    [address] [facility],[address] [place] [address], [place], [place] Night, Jessicaa    Office Visit    6 months ago Cough    [address], [address], Jose A Sanchez MD    Office Visit            Future Appointments         Provider Department Appt Notes    In 3 days Andres Anderson, [address] [place] [place], [address] [facility],[address], [place]

## 2023-01-16 RX ORDER — NAPROXEN 500 MG/1
TABLET ORAL
Qty: 20 TABLET | Refills: 0 | Status: SHIPPED | OUTPATIENT
Start: 2023-01-16

## 2023-01-18 ENCOUNTER — TELEMEDICINE (OUTPATIENT)
Dept: SURGERY | Facility: CLINIC | Age: 58
End: 2023-01-18
Payer: COMMERCIAL

## 2023-01-18 VITALS — WEIGHT: 185 LBS | BODY MASS INDEX: 31 KG/M2

## 2023-01-18 DIAGNOSIS — G47.33 OSA ON CPAP: ICD-10-CM

## 2023-01-18 DIAGNOSIS — E78.00 HYPERCHOLESTEROLEMIA: Primary | ICD-10-CM

## 2023-01-18 DIAGNOSIS — E11.9 TYPE 2 DIABETES MELLITUS WITHOUT RETINOPATHY (HCC): ICD-10-CM

## 2023-01-18 DIAGNOSIS — M54.50 LOW BACK PAIN, UNSPECIFIED BACK PAIN LATERALITY, UNSPECIFIED CHRONICITY, UNSPECIFIED WHETHER SCIATICA PRESENT: ICD-10-CM

## 2023-01-18 DIAGNOSIS — Z51.81 ENCOUNTER FOR THERAPEUTIC DRUG MONITORING: ICD-10-CM

## 2023-01-18 DIAGNOSIS — R12 HEART BURN: ICD-10-CM

## 2023-01-18 DIAGNOSIS — Z99.89 OSA ON CPAP: ICD-10-CM

## 2023-01-18 PROCEDURE — 99214 OFFICE O/P EST MOD 30 MIN: CPT | Performed by: NURSE PRACTITIONER

## 2023-01-18 RX ORDER — PHENTERMINE HYDROCHLORIDE 37.5 MG/1
37.5 TABLET ORAL
Qty: 30 TABLET | Refills: 2 | Status: SHIPPED | OUTPATIENT
Start: 2023-01-18

## 2023-01-21 ENCOUNTER — NURSE ONLY (OUTPATIENT)
Dept: LAB | Age: 58
End: 2023-01-21
Attending: NURSE PRACTITIONER
Payer: COMMERCIAL

## 2023-01-21 ENCOUNTER — PATIENT MESSAGE (OUTPATIENT)
Dept: FAMILY MEDICINE CLINIC | Facility: CLINIC | Age: 58
End: 2023-01-21

## 2023-01-21 ENCOUNTER — LAB ENCOUNTER (OUTPATIENT)
Dept: LAB | Age: 58
End: 2023-01-21
Attending: NURSE PRACTITIONER
Payer: COMMERCIAL

## 2023-01-21 DIAGNOSIS — G47.33 OSA ON CPAP: ICD-10-CM

## 2023-01-21 DIAGNOSIS — E78.00 HYPERCHOLESTEROLEMIA: ICD-10-CM

## 2023-01-21 DIAGNOSIS — E78.5 HYPERLIPIDEMIA, UNSPECIFIED HYPERLIPIDEMIA TYPE: ICD-10-CM

## 2023-01-21 DIAGNOSIS — E11.9 TYPE 2 DIABETES MELLITUS WITHOUT RETINOPATHY (HCC): ICD-10-CM

## 2023-01-21 DIAGNOSIS — Z99.89 OSA ON CPAP: ICD-10-CM

## 2023-01-21 DIAGNOSIS — Z51.81 ENCOUNTER FOR THERAPEUTIC DRUG MONITORING: ICD-10-CM

## 2023-01-21 DIAGNOSIS — M54.50 LOW BACK PAIN, UNSPECIFIED BACK PAIN LATERALITY, UNSPECIFIED CHRONICITY, UNSPECIFIED WHETHER SCIATICA PRESENT: ICD-10-CM

## 2023-01-21 DIAGNOSIS — R12 HEART BURN: ICD-10-CM

## 2023-01-21 NOTE — TELEPHONE ENCOUNTER
From: Michell Bazan  To: Farnaz Beltre MD  Sent: 1/21/2023 7:16 AM CST  Subject: Upcoming Test     Good morning  I noticed that I have 2 exams that I have to do but do I have to due this at your office or at the hospital?? Do I have to make a opponent for this   Please let me know   Thank You Dr. Valentina Orr

## 2023-01-24 ENCOUNTER — OFFICE VISIT (OUTPATIENT)
Dept: PHYSICAL MEDICINE AND REHAB | Facility: CLINIC | Age: 58
End: 2023-01-24
Payer: COMMERCIAL

## 2023-01-24 ENCOUNTER — HOSPITAL ENCOUNTER (OUTPATIENT)
Dept: GENERAL RADIOLOGY | Facility: HOSPITAL | Age: 58
Discharge: HOME | End: 2023-01-24
Attending: PHYSICAL MEDICINE & REHABILITATION
Payer: COMMERCIAL

## 2023-01-24 ENCOUNTER — TELEPHONE (OUTPATIENT)
Dept: PHYSICAL MEDICINE AND REHAB | Facility: CLINIC | Age: 58
End: 2023-01-24

## 2023-01-24 VITALS — BODY MASS INDEX: 31.16 KG/M2 | WEIGHT: 187 LBS | HEIGHT: 65 IN

## 2023-01-24 DIAGNOSIS — M75.52 BURSITIS OF LEFT SHOULDER: ICD-10-CM

## 2023-01-24 DIAGNOSIS — M75.42 SUBACROMIAL IMPINGEMENT OF LEFT SHOULDER: ICD-10-CM

## 2023-01-24 DIAGNOSIS — M54.12 CERVICAL RADICULOPATHY: ICD-10-CM

## 2023-01-24 DIAGNOSIS — M25.512 CHRONIC LEFT SHOULDER PAIN: ICD-10-CM

## 2023-01-24 DIAGNOSIS — M50.30 DDD (DEGENERATIVE DISC DISEASE), CERVICAL: ICD-10-CM

## 2023-01-24 DIAGNOSIS — G89.29 CHRONIC LEFT SHOULDER PAIN: ICD-10-CM

## 2023-01-24 DIAGNOSIS — M47.812 CERVICAL FACET SYNDROME: ICD-10-CM

## 2023-01-24 DIAGNOSIS — M48.02 FORAMINAL STENOSIS OF CERVICAL REGION: ICD-10-CM

## 2023-01-24 DIAGNOSIS — M67.919 TENDINOPATHY OF ROTATOR CUFF, UNSPECIFIED LATERALITY: ICD-10-CM

## 2023-01-24 DIAGNOSIS — M54.2 TRIGGER POINT OF NECK: Primary | ICD-10-CM

## 2023-01-24 DIAGNOSIS — M54.2 TRIGGER POINT OF NECK: ICD-10-CM

## 2023-01-24 PROBLEM — D50.9 IRON DEFICIENCY ANEMIA: Status: ACTIVE | Noted: 2023-01-24

## 2023-01-24 PROBLEM — K83.9 DISORDER OF BILIARY TRACT: Status: ACTIVE | Noted: 2023-01-24

## 2023-01-24 PROCEDURE — 3008F BODY MASS INDEX DOCD: CPT | Performed by: PHYSICAL MEDICINE & REHABILITATION

## 2023-01-24 PROCEDURE — 72050 X-RAY EXAM NECK SPINE 4/5VWS: CPT | Performed by: PHYSICAL MEDICINE & REHABILITATION

## 2023-01-24 PROCEDURE — 20610 DRAIN/INJ JOINT/BURSA W/O US: CPT | Performed by: PHYSICAL MEDICINE & REHABILITATION

## 2023-01-24 PROCEDURE — 99214 OFFICE O/P EST MOD 30 MIN: CPT | Performed by: PHYSICAL MEDICINE & REHABILITATION

## 2023-01-24 PROCEDURE — 73030 X-RAY EXAM OF SHOULDER: CPT | Performed by: PHYSICAL MEDICINE & REHABILITATION

## 2023-01-24 NOTE — TELEPHONE ENCOUNTER
Initiated authorization for LEFT subacromial CSI CPT 27384+ with [employer]  Status: Approved-authorization is not required per health plan

## 2023-02-04 ENCOUNTER — LAB ENCOUNTER (OUTPATIENT)
Dept: LAB | Age: 58
End: 2023-02-04
Attending: FAMILY MEDICINE
Payer: COMMERCIAL

## 2023-02-04 ENCOUNTER — EKG ENCOUNTER (OUTPATIENT)
Dept: LAB | Age: 58
End: 2023-02-04
Attending: FAMILY MEDICINE
Payer: COMMERCIAL

## 2023-02-04 DIAGNOSIS — Z51.81 ENCOUNTER FOR THERAPEUTIC DRUG MONITORING: ICD-10-CM

## 2023-02-04 DIAGNOSIS — G47.33 OBSTRUCTIVE SLEEP APNEA (ADULT) (PEDIATRIC): ICD-10-CM

## 2023-02-04 DIAGNOSIS — R07.9 CHEST PAIN OF UNCERTAIN ETIOLOGY: Primary | ICD-10-CM

## 2023-02-04 DIAGNOSIS — M54.50 LUMBAGO: ICD-10-CM

## 2023-02-04 DIAGNOSIS — E78.00 PURE HYPERCHOLESTEROLEMIA: ICD-10-CM

## 2023-02-04 DIAGNOSIS — E11.9 DIABETES MELLITUS (HCC): ICD-10-CM

## 2023-02-04 LAB
ALBUMIN SERPL-MCNC: 3.7 G/DL (ref 3.4–5)
ALP LIVER SERPL-CCNC: 130 U/L
ALT SERPL-CCNC: 20 U/L
AST SERPL-CCNC: 13 U/L (ref 15–37)
BILIRUB DIRECT SERPL-MCNC: <0.1 MG/DL (ref 0–0.2)
BILIRUB SERPL-MCNC: 0.6 MG/DL (ref 0.1–2)
CHOLEST SERPL-MCNC: 210 MG/DL (ref ?–200)
FASTING PATIENT LIPID ANSWER: YES
HDLC SERPL-MCNC: 63 MG/DL (ref 40–59)
LDLC SERPL CALC-MCNC: 133 MG/DL (ref ?–100)
NONHDLC SERPL-MCNC: 147 MG/DL (ref ?–130)
PROT SERPL-MCNC: 7.5 G/DL (ref 6.4–8.2)
TRIGL SERPL-MCNC: 81 MG/DL (ref 30–149)
VLDLC SERPL CALC-MCNC: 15 MG/DL (ref 0–30)

## 2023-02-04 PROCEDURE — 93005 ELECTROCARDIOGRAM TRACING: CPT

## 2023-02-04 PROCEDURE — 93010 ELECTROCARDIOGRAM REPORT: CPT | Performed by: INTERNAL MEDICINE

## 2023-02-06 LAB
ATRIAL RATE: 76 BPM
P AXIS: -10 DEGREES
P-R INTERVAL: 128 MS
Q-T INTERVAL: 398 MS
QRS DURATION: 84 MS
QTC CALCULATION (BEZET): 447 MS
R AXIS: 50 DEGREES
T AXIS: 9 DEGREES
VENTRICULAR RATE: 76 BPM

## 2023-02-07 DIAGNOSIS — K76.0 FATTY LIVER DISEASE, NONALCOHOLIC: Primary | ICD-10-CM

## 2023-02-07 RX ORDER — ATORVASTATIN CALCIUM 20 MG/1
20 TABLET, FILM COATED ORAL NIGHTLY
Qty: 90 TABLET | Refills: 4 | Status: SHIPPED | OUTPATIENT
Start: 2023-02-07

## 2023-03-07 ENCOUNTER — NURSE TRIAGE (OUTPATIENT)
Dept: FAMILY MEDICINE CLINIC | Facility: CLINIC | Age: 58
End: 2023-03-07

## 2023-03-09 ENCOUNTER — NURSE TRIAGE (OUTPATIENT)
Dept: FAMILY MEDICINE CLINIC | Facility: CLINIC | Age: 58
End: 2023-03-09

## 2023-03-16 ENCOUNTER — TELEPHONE (OUTPATIENT)
Dept: PHYSICAL MEDICINE AND REHAB | Facility: CLINIC | Age: 58
End: 2023-03-16

## 2023-03-16 ENCOUNTER — OFFICE VISIT (OUTPATIENT)
Dept: PHYSICAL MEDICINE AND REHAB | Facility: CLINIC | Age: 58
End: 2023-03-16
Payer: COMMERCIAL

## 2023-03-16 VITALS
DIASTOLIC BLOOD PRESSURE: 90 MMHG | WEIGHT: 183 LBS | SYSTOLIC BLOOD PRESSURE: 120 MMHG | HEIGHT: 65 IN | BODY MASS INDEX: 30.49 KG/M2

## 2023-03-16 DIAGNOSIS — M51.37 DDD (DEGENERATIVE DISC DISEASE), LUMBOSACRAL: ICD-10-CM

## 2023-03-16 DIAGNOSIS — M47.816 FACET SYNDROME, LUMBAR: ICD-10-CM

## 2023-03-16 DIAGNOSIS — M51.36 BULGE OF LUMBAR DISC WITHOUT MYELOPATHY: ICD-10-CM

## 2023-03-16 DIAGNOSIS — M22.2X9 PATELLOFEMORAL PAIN SYNDROME, UNSPECIFIED LATERALITY: Primary | ICD-10-CM

## 2023-03-16 DIAGNOSIS — M17.10 PRIMARY OSTEOARTHRITIS OF KNEE, UNSPECIFIED LATERALITY: ICD-10-CM

## 2023-03-16 DIAGNOSIS — M47.816 LUMBAR SPONDYLOSIS: ICD-10-CM

## 2023-03-16 DIAGNOSIS — M54.59 MECHANICAL LOW BACK PAIN: ICD-10-CM

## 2023-03-16 DIAGNOSIS — M48.061 LUMBAR FORAMINAL STENOSIS: ICD-10-CM

## 2023-03-16 DIAGNOSIS — M51.16 LUMBAR DISC HERNIATION WITH RADICULOPATHY: ICD-10-CM

## 2023-03-16 DIAGNOSIS — E78.5 HYPERLIPIDEMIA, UNSPECIFIED HYPERLIPIDEMIA TYPE: ICD-10-CM

## 2023-03-16 DIAGNOSIS — M54.59 LUMBAR TRIGGER POINT SYNDROME: ICD-10-CM

## 2023-03-16 PROCEDURE — 99214 OFFICE O/P EST MOD 30 MIN: CPT | Performed by: PHYSICAL MEDICINE & REHABILITATION

## 2023-03-16 PROCEDURE — 3074F SYST BP LT 130 MM HG: CPT | Performed by: PHYSICAL MEDICINE & REHABILITATION

## 2023-03-16 PROCEDURE — 3008F BODY MASS INDEX DOCD: CPT | Performed by: PHYSICAL MEDICINE & REHABILITATION

## 2023-03-16 PROCEDURE — 3080F DIAST BP >= 90 MM HG: CPT | Performed by: PHYSICAL MEDICINE & REHABILITATION

## 2023-03-16 RX ORDER — NAPROXEN 500 MG/1
500 TABLET ORAL 2 TIMES DAILY WITH MEALS
Qty: 60 TABLET | Refills: 0 | Status: SHIPPED | OUTPATIENT
Start: 2023-03-16

## 2023-03-16 NOTE — PATIENT INSTRUCTIONS
1) Please begin physical therapy as soon as possible. 2) My office will call you to schedule the LEFT knee HA and CSI under ultrasound guidance once the procedure is approved by your insurance carrier. 3) Follow up with me in about 6-8 weeks after beginning therapy but I want to see you sooner for the left knee injection  4) Continue Naprosyn as needed. Continue Flexeril as needed but do not take while working or driving. 5) Tylenol 500-1000 mg every 6-8 hours as needed for pain. No more than 3000 mg daily.

## 2023-03-16 NOTE — TELEPHONE ENCOUNTER
Initiated authorization for LEFT knee Durolane and CSI under ultrasound guidance CPT 45147, [id number],  with [employer]  Transaction ID: [id number]  Status: Approved-authorization is not required per health plan-[employer]    Patient already scheduled 3/23/23

## 2023-03-23 ENCOUNTER — OFFICE VISIT (OUTPATIENT)
Dept: PHYSICAL MEDICINE AND REHAB | Facility: CLINIC | Age: 58
End: 2023-03-23
Payer: COMMERCIAL

## 2023-03-23 DIAGNOSIS — M17.10 PRIMARY OSTEOARTHRITIS OF KNEE, UNSPECIFIED LATERALITY: ICD-10-CM

## 2023-03-23 DIAGNOSIS — M22.2X9 PATELLOFEMORAL PAIN SYNDROME, UNSPECIFIED LATERALITY: Primary | ICD-10-CM

## 2023-03-23 PROCEDURE — 20611 DRAIN/INJ JOINT/BURSA W/US: CPT | Performed by: PHYSICAL MEDICINE & REHABILITATION

## 2023-03-23 RX ORDER — LIDOCAINE HYDROCHLORIDE 10 MG/ML
7 INJECTION, SOLUTION INFILTRATION; PERINEURAL ONCE
Status: COMPLETED | OUTPATIENT
Start: 2023-03-23 | End: 2023-03-23

## 2023-03-23 RX ORDER — TRIAMCINOLONE ACETONIDE 40 MG/ML
40 INJECTION, SUSPENSION INTRA-ARTICULAR; INTRAMUSCULAR ONCE
Status: COMPLETED | OUTPATIENT
Start: 2023-03-23 | End: 2023-03-23

## 2023-03-23 RX ADMIN — LIDOCAINE HYDROCHLORIDE 7 ML: 10 INJECTION, SOLUTION INFILTRATION; PERINEURAL at 16:33:00

## 2023-03-23 RX ADMIN — TRIAMCINOLONE ACETONIDE 40 MG: 40 INJECTION, SUSPENSION INTRA-ARTICULAR; INTRAMUSCULAR at 16:34:00

## 2023-03-23 NOTE — PATIENT INSTRUCTIONS
Post Injection Instructions     Please do not do anything strenuous over the next two days (if you had a knee injection do not walk more than 2 city blocks, do not attend any aerobic classes, do not run, no heavy lifting, no prolong standing). You may resume your day to day activities after your injection. You may experience some mild amount of swelling after the procedure. Please ice your joint that was injected at least 5-6 times a day (15 minutes) for two days after (this will help prevent worsening pain that sometimes occurs after an injection). Only take tylenol if needed for pain for the first few days. Watch for signs of infection which include redness, warmth, worsening pain, fevers or chills. If you develop any of these signs call the office immediately at [phone]    Everyone responds differently to injections, but you can expect your peak effects a few weeks after your last injection. [employer]ak [employer].  Lionel Jaime MD  Physical Medicine and Rehabilitation/Sports Medicine  MEDICAL CENTER [facility]

## 2023-03-27 ENCOUNTER — TELEPHONE (OUTPATIENT)
Dept: UROLOGY | Facility: HOSPITAL | Age: 58
End: 2023-03-27

## 2023-03-27 ENCOUNTER — LABORATORY ENCOUNTER (OUTPATIENT)
Dept: LAB | Age: 58
End: 2023-03-27
Attending: PHYSICIAN ASSISTANT
Payer: COMMERCIAL

## 2023-03-27 DIAGNOSIS — N81.84 PELVIC MUSCLE WASTING: ICD-10-CM

## 2023-03-27 DIAGNOSIS — N81.84 PELVIC MUSCLE WASTING: Primary | ICD-10-CM

## 2023-03-27 PROCEDURE — 87086 URINE CULTURE/COLONY COUNT: CPT

## 2023-03-27 PROCEDURE — 87186 SC STD MICRODIL/AGAR DIL: CPT

## 2023-03-27 PROCEDURE — 87088 URINE BACTERIA CULTURE: CPT

## 2023-03-27 RX ORDER — NITROFURANTOIN 25; 75 MG/1; MG/1
100 CAPSULE ORAL 2 TIMES DAILY
Qty: 14 CAPSULE | Refills: 0 | Status: SHIPPED | OUTPATIENT
Start: 2023-03-27 | End: 2023-04-03

## 2023-03-27 NOTE — TELEPHONE ENCOUNTER
Telephone call received from patient.      Patient complains of UTI signs and symptoms including urgency, frequency, dysuria x 2 days    Denies fever    Allergies and previous cultures reviewed    Discussed with Gian Hendrickson, MARVIN Macrobid 100 mg PO bid x 7 days    Urine culture ordered, will follow    Empiric antibiotics sent to patient's preferred pharmacy    Encouraged PO hydration, AZO prn for pain     Discussed avoidance of bladder irritants such as alcohol, caffeine, carbonation    All questions answered    Encouraged to call if symptoms worsen or fail to improve     Office number provided [phone]    Patient understands and agrees to plan

## 2023-03-29 ENCOUNTER — TELEPHONE (OUTPATIENT)
Dept: UROLOGY | Facility: HOSPITAL | Age: 58
End: 2023-03-29

## 2023-03-29 NOTE — TELEPHONE ENCOUNTER
Outgoing telephone call to patient with test results    Ucx obtained on 03/27/2023 showed >100,000 E coli. Allergies reviewed    Discussed with MARVIN Dos Santos continue antibiotics as prescribed. Patient reports feeling better since starting Macrobid.     Encouraged PO hydration    Encouraged to avoid bladder irritants such as caffeine, alcohol, or carbonation    All questions answered     Follow up as scheduled, sooner prn    Patient understands and agrees to plan

## 2023-04-08 DIAGNOSIS — N95.2 POSTMENOPAUSAL ATROPHIC VAGINITIS: ICD-10-CM

## 2023-04-08 RX ORDER — ESTRADIOL 0.1 MG/G
CREAM VAGINAL
Qty: 1 EACH | Refills: 3 | Status: CANCELLED | OUTPATIENT
Start: 2023-04-08

## 2023-04-10 RX ORDER — PHENTERMINE HYDROCHLORIDE 37.5 MG/1
TABLET ORAL
Qty: 30 TABLET | Refills: 0 | Status: SHIPPED | OUTPATIENT
Start: 2023-04-10

## 2023-04-13 ENCOUNTER — OFFICE VISIT (OUTPATIENT)
Dept: SURGERY | Facility: CLINIC | Age: 58
End: 2023-04-13
Payer: COMMERCIAL

## 2023-04-13 VITALS
OXYGEN SATURATION: 99 % | DIASTOLIC BLOOD PRESSURE: 70 MMHG | SYSTOLIC BLOOD PRESSURE: 112 MMHG | HEART RATE: 96 BPM | HEIGHT: 64.2 IN | WEIGHT: 183 LBS | BODY MASS INDEX: 31.24 KG/M2

## 2023-04-13 DIAGNOSIS — Z51.81 ENCOUNTER FOR THERAPEUTIC DRUG MONITORING: ICD-10-CM

## 2023-04-13 DIAGNOSIS — R12 HEART BURN: ICD-10-CM

## 2023-04-13 DIAGNOSIS — E11.9 TYPE 2 DIABETES MELLITUS WITHOUT RETINOPATHY (HCC): Primary | ICD-10-CM

## 2023-04-13 DIAGNOSIS — Z99.89 OSA ON CPAP: ICD-10-CM

## 2023-04-13 DIAGNOSIS — M54.50 LOW BACK PAIN, UNSPECIFIED BACK PAIN LATERALITY, UNSPECIFIED CHRONICITY, UNSPECIFIED WHETHER SCIATICA PRESENT: ICD-10-CM

## 2023-04-13 DIAGNOSIS — G47.33 OSA ON CPAP: ICD-10-CM

## 2023-04-13 DIAGNOSIS — E66.9 OBESITY (BMI 30-39.9): ICD-10-CM

## 2023-04-13 DIAGNOSIS — E78.00 HYPERCHOLESTEROLEMIA: ICD-10-CM

## 2023-04-13 DIAGNOSIS — R53.83 FATIGUE, UNSPECIFIED TYPE: ICD-10-CM

## 2023-04-13 DIAGNOSIS — E55.9 VITAMIN D DEFICIENCY: ICD-10-CM

## 2023-04-13 PROCEDURE — 3078F DIAST BP <80 MM HG: CPT | Performed by: NURSE PRACTITIONER

## 2023-04-13 PROCEDURE — 3074F SYST BP LT 130 MM HG: CPT | Performed by: NURSE PRACTITIONER

## 2023-04-13 PROCEDURE — 99214 OFFICE O/P EST MOD 30 MIN: CPT | Performed by: NURSE PRACTITIONER

## 2023-04-13 PROCEDURE — 3008F BODY MASS INDEX DOCD: CPT | Performed by: NURSE PRACTITIONER

## 2023-04-13 RX ORDER — PHENTERMINE HYDROCHLORIDE 37.5 MG/1
37.5 TABLET ORAL
Qty: 30 TABLET | Refills: 2 | Status: SHIPPED | OUTPATIENT
Start: 2023-04-13

## 2023-04-15 NOTE — TELEPHONE ENCOUNTER
CSS=please assists. Last office visit 10/12/22. Future Appointments   Date Time Provider Juliet Robbins   5/25/2023  1:00 PM Chance Sethi MD PM&R [facility]   7/31/2023 11:00 AM [address] message sent to schedule an appointment. Please review; protocol failed/no protocol.      Requested Prescriptions   Pending Prescriptions Disp Refills    INVOKANA 100 MG Oral Tab [Pharmacy Med Name: INVOKANA 100MG TABLETS] 90 tablet 1     Sig: TAKE 1 TABLET BY MOUTH BEFORE BREAKFAST       Diabetes Medication Protocol Failed - 4/15/2023  9:30 AM        Failed - Last A1C < 7.5 and within past 6 months     Lab Results   Component Value Date    A1C 6.1 (A) 10/12/2022               Failed - In person appointment or virtual visit in the past 6 mos or appointment in next 3 mos     Recent Outpatient Visits              2 days ago Type 2 diabetes mellitus without retinopathy ([facility] [address]    [address], [address], [facility], [facility]    Office Visit    3 weeks ago Patellofemoral pain syndrome, unspecified laterality    [facility], [address], Antonietta Staton MD    Office Visit    1 month ago Patellofemoral pain syndrome, unspecified laterality    [facility], [address], Antonietta Staton MD    Office Visit    2 months ago [employer], [address], Antonietta Staton MD    Office Visit    2 months ago Hyperlipidemia, unspecified hyperlipidemia type    [place], [address] [facility]    Nurse Only          Future Appointments         Provider Department Appt Notes    In 1 month [facility] Sethi MD [address] [facility] F/U    In 3 months Noe Cruz PA-C [facility], [address] [place] [address] PPO  NON SX CONSULT, HAS SEEN MAXX PERDOMO IN THE PAST               Failed - [facility] or GFRNAA > 50     GFR Evaluation              Failed - GFR in the past 12 months              Recent Outpatient Visits              2 days ago Type 2 diabetes mellitus without retinopathy ([facility] [address]    Diana Lebron, [address], Tommy Vann APRN    Office Visit    3 weeks ago Patellofemoral pain syndrome, unspecified laterality    [facility], [address], Win Tafoya MD    Office Visit    1 month ago Patellofemoral pain syndrome, unspecified laterality    [facility], [address], Win Tafoya MD    Office Visit    2 months ago [employer], [address], Win Tafoya MD    Office Visit    2 months ago Hyperlipidemia, unspecified hyperlipidemia type    [facility] Mimi Whitman    Nurse Only             Future Appointments         Provider Department Appt Notes    In 1 month Fidelina Maharaj MD [address] [facility], [place] 2 Month F/U    In 3 months Syd Vera PA-C [facility], [address] [place], [place] [address]BS PPO  NON SX CONSULT, [address] [facility]

## 2023-04-16 RX ORDER — EMPAGLIFLOZIN 25 MG/1
25 TABLET, FILM COATED ORAL DAILY
Qty: 90 TABLET | Refills: 4 | Status: SHIPPED | OUTPATIENT
Start: 2023-04-16

## 2023-04-16 RX ORDER — CANAGLIFLOZIN 100 MG/1
100 TABLET, FILM COATED ORAL
Qty: 90 TABLET | Refills: 1 | Status: CANCELLED | OUTPATIENT
Start: 2023-04-16

## 2023-05-15 ENCOUNTER — HOSPITAL ENCOUNTER (OUTPATIENT)
Dept: MAMMOGRAPHY | Age: 58
Discharge: HOME | End: 2023-05-15
Attending: FAMILY MEDICINE
Payer: COMMERCIAL

## 2023-05-15 DIAGNOSIS — Z12.31 BREAST CANCER SCREENING BY MAMMOGRAM: ICD-10-CM

## 2023-05-15 PROCEDURE — 77067 SCR MAMMO BI INCL CAD: CPT | Performed by: FAMILY MEDICINE

## 2023-05-15 PROCEDURE — 77063 BREAST TOMOSYNTHESIS BI: CPT | Performed by: FAMILY MEDICINE

## 2023-06-22 ENCOUNTER — TELEPHONE (OUTPATIENT)
Dept: PHYSICAL MEDICINE AND REHAB | Facility: CLINIC | Age: 58
End: 2023-06-22

## 2023-06-22 ENCOUNTER — OFFICE VISIT (OUTPATIENT)
Dept: PHYSICAL MEDICINE AND REHAB | Facility: CLINIC | Age: 58
End: 2023-06-22
Payer: COMMERCIAL

## 2023-06-22 VITALS — HEART RATE: 98 BPM | BODY MASS INDEX: 31 KG/M2 | OXYGEN SATURATION: 97 % | WEIGHT: 182 LBS

## 2023-06-22 DIAGNOSIS — M51.37 DDD (DEGENERATIVE DISC DISEASE), LUMBOSACRAL: ICD-10-CM

## 2023-06-22 DIAGNOSIS — M54.59 MECHANICAL LOW BACK PAIN: ICD-10-CM

## 2023-06-22 DIAGNOSIS — S76.019A STRAIN OF GLUTEUS MEDIUS, UNSPECIFIED LATERALITY, INITIAL ENCOUNTER: ICD-10-CM

## 2023-06-22 DIAGNOSIS — M51.36 BULGE OF LUMBAR DISC WITHOUT MYELOPATHY: ICD-10-CM

## 2023-06-22 DIAGNOSIS — M54.16 LUMBAR RADICULOPATHY: ICD-10-CM

## 2023-06-22 DIAGNOSIS — M70.61 GREATER TROCHANTERIC BURSITIS OF BOTH HIPS: Primary | ICD-10-CM

## 2023-06-22 DIAGNOSIS — M47.816 LUMBAR SPONDYLOSIS: ICD-10-CM

## 2023-06-22 DIAGNOSIS — M70.62 GREATER TROCHANTERIC BURSITIS OF BOTH HIPS: Primary | ICD-10-CM

## 2023-06-22 DIAGNOSIS — M48.061 LUMBAR FORAMINAL STENOSIS: ICD-10-CM

## 2023-06-22 DIAGNOSIS — E66.09 CLASS 1 OBESITY DUE TO EXCESS CALORIES WITH SERIOUS COMORBIDITY AND BODY MASS INDEX (BMI) OF 31.0 TO 31.9 IN ADULT: ICD-10-CM

## 2023-06-22 DIAGNOSIS — M47.816 FACET SYNDROME, LUMBAR: ICD-10-CM

## 2023-06-22 RX ORDER — LIDOCAINE HYDROCHLORIDE 10 MG/ML
5 INJECTION, SOLUTION INFILTRATION; PERINEURAL ONCE
Status: COMPLETED | OUTPATIENT
Start: 2023-06-22 | End: 2023-06-22

## 2023-06-22 RX ORDER — TRIAMCINOLONE ACETONIDE 40 MG/ML
40 INJECTION, SUSPENSION INTRA-ARTICULAR; INTRAMUSCULAR ONCE
Status: COMPLETED | OUTPATIENT
Start: 2023-06-22 | End: 2023-06-22

## 2023-06-22 NOTE — TELEPHONE ENCOUNTER
Initiated authorization for LEFT greater trochanter CSI under ultrasound guidance CPT 88737,  with [employer]  Status: Approved-authorization is not required per health plan      inj done in office

## 2023-07-12 ENCOUNTER — HOSPITAL ENCOUNTER (OUTPATIENT)
Dept: MRI IMAGING | Age: 58
Discharge: HOME | End: 2023-07-12
Attending: PHYSICAL MEDICINE & REHABILITATION
Payer: COMMERCIAL

## 2023-07-12 DIAGNOSIS — M54.59 MECHANICAL LOW BACK PAIN: ICD-10-CM

## 2023-07-12 DIAGNOSIS — M47.816 FACET SYNDROME, LUMBAR: ICD-10-CM

## 2023-07-12 DIAGNOSIS — M51.37 DDD (DEGENERATIVE DISC DISEASE), LUMBOSACRAL: ICD-10-CM

## 2023-07-12 DIAGNOSIS — M70.62 GREATER TROCHANTERIC BURSITIS OF BOTH HIPS: ICD-10-CM

## 2023-07-12 DIAGNOSIS — E66.09 CLASS 1 OBESITY DUE TO EXCESS CALORIES WITH SERIOUS COMORBIDITY AND BODY MASS INDEX (BMI) OF 31.0 TO 31.9 IN ADULT: ICD-10-CM

## 2023-07-12 DIAGNOSIS — S76.019A STRAIN OF GLUTEUS MEDIUS, UNSPECIFIED LATERALITY, INITIAL ENCOUNTER: ICD-10-CM

## 2023-07-12 DIAGNOSIS — M54.16 LUMBAR RADICULOPATHY: ICD-10-CM

## 2023-07-12 DIAGNOSIS — M48.061 LUMBAR FORAMINAL STENOSIS: ICD-10-CM

## 2023-07-12 DIAGNOSIS — M47.816 LUMBAR SPONDYLOSIS: ICD-10-CM

## 2023-07-12 DIAGNOSIS — M51.36 BULGE OF LUMBAR DISC WITHOUT MYELOPATHY: ICD-10-CM

## 2023-07-12 DIAGNOSIS — M70.61 GREATER TROCHANTERIC BURSITIS OF BOTH HIPS: ICD-10-CM

## 2023-07-12 PROCEDURE — 72148 MRI LUMBAR SPINE W/O DYE: CPT | Performed by: PHYSICAL MEDICINE & REHABILITATION

## 2023-07-27 ENCOUNTER — OFFICE VISIT (OUTPATIENT)
Dept: PHYSICAL MEDICINE AND REHAB | Facility: CLINIC | Age: 58
End: 2023-07-27
Payer: COMMERCIAL

## 2023-07-27 VITALS
SYSTOLIC BLOOD PRESSURE: 114 MMHG | HEIGHT: 64.2 IN | WEIGHT: 182 LBS | DIASTOLIC BLOOD PRESSURE: 78 MMHG | BODY MASS INDEX: 31.07 KG/M2

## 2023-07-27 DIAGNOSIS — M51.36 BULGE OF LUMBAR DISC WITHOUT MYELOPATHY: ICD-10-CM

## 2023-07-27 DIAGNOSIS — M70.61 GREATER TROCHANTERIC BURSITIS OF BOTH HIPS: Primary | ICD-10-CM

## 2023-07-27 DIAGNOSIS — M54.59 MECHANICAL LOW BACK PAIN: ICD-10-CM

## 2023-07-27 DIAGNOSIS — E66.09 CLASS 1 OBESITY DUE TO EXCESS CALORIES WITH SERIOUS COMORBIDITY AND BODY MASS INDEX (BMI) OF 31.0 TO 31.9 IN ADULT: ICD-10-CM

## 2023-07-27 DIAGNOSIS — M51.37 DDD (DEGENERATIVE DISC DISEASE), LUMBOSACRAL: ICD-10-CM

## 2023-07-27 DIAGNOSIS — M70.62 GREATER TROCHANTERIC BURSITIS OF BOTH HIPS: Primary | ICD-10-CM

## 2023-07-27 DIAGNOSIS — M47.816 LUMBAR SPONDYLOSIS: ICD-10-CM

## 2023-07-27 DIAGNOSIS — S76.019A STRAIN OF GLUTEUS MEDIUS, UNSPECIFIED LATERALITY, INITIAL ENCOUNTER: ICD-10-CM

## 2023-07-27 DIAGNOSIS — M47.816 FACET SYNDROME, LUMBAR: ICD-10-CM

## 2023-07-27 DIAGNOSIS — M48.061 LUMBAR FORAMINAL STENOSIS: ICD-10-CM

## 2023-07-27 DIAGNOSIS — M54.16 LUMBAR RADICULOPATHY: ICD-10-CM

## 2023-07-27 PROCEDURE — 99214 OFFICE O/P EST MOD 30 MIN: CPT | Performed by: PHYSICAL MEDICINE & REHABILITATION

## 2023-07-27 PROCEDURE — 3074F SYST BP LT 130 MM HG: CPT | Performed by: PHYSICAL MEDICINE & REHABILITATION

## 2023-07-27 PROCEDURE — 3078F DIAST BP <80 MM HG: CPT | Performed by: PHYSICAL MEDICINE & REHABILITATION

## 2023-07-27 PROCEDURE — 3008F BODY MASS INDEX DOCD: CPT | Performed by: PHYSICAL MEDICINE & REHABILITATION

## 2023-07-27 NOTE — PATIENT INSTRUCTIONS
1) Continue home exercise program  2) Tylenol 500-1000 mg every 6-8 hours as needed for pain. No more than 3000 mg daily. 3) Follow up with me as needed.  If left sided symptoms return, then would recommned LEFt L5 and LEFT S1 TFESI

## 2023-07-31 ENCOUNTER — OFFICE VISIT (OUTPATIENT)
Dept: INTERNAL MEDICINE CLINIC | Facility: CLINIC | Age: 58
End: 2023-07-31
Payer: COMMERCIAL

## 2023-07-31 VITALS
DIASTOLIC BLOOD PRESSURE: 76 MMHG | HEART RATE: 80 BPM | SYSTOLIC BLOOD PRESSURE: 116 MMHG | RESPIRATION RATE: 16 BRPM | WEIGHT: 183 LBS | BODY MASS INDEX: 32.02 KG/M2 | HEIGHT: 63.5 IN

## 2023-07-31 DIAGNOSIS — G47.33 OSA ON CPAP: ICD-10-CM

## 2023-07-31 DIAGNOSIS — Z51.81 ENCOUNTER FOR THERAPEUTIC DRUG LEVEL MONITORING: Primary | ICD-10-CM

## 2023-07-31 DIAGNOSIS — E66.9 CLASS 1 OBESITY WITH SERIOUS COMORBIDITY AND BODY MASS INDEX (BMI) OF 31.0 TO 31.9 IN ADULT, UNSPECIFIED OBESITY TYPE: ICD-10-CM

## 2023-07-31 DIAGNOSIS — E78.00 HYPERCHOLESTEROLEMIA: ICD-10-CM

## 2023-07-31 DIAGNOSIS — E11.9 TYPE 2 DIABETES MELLITUS WITHOUT RETINOPATHY (HCC): ICD-10-CM

## 2023-07-31 PROCEDURE — 99214 OFFICE O/P EST MOD 30 MIN: CPT | Performed by: PHYSICIAN ASSISTANT

## 2023-07-31 PROCEDURE — 3074F SYST BP LT 130 MM HG: CPT | Performed by: PHYSICIAN ASSISTANT

## 2023-07-31 PROCEDURE — 3078F DIAST BP <80 MM HG: CPT | Performed by: PHYSICIAN ASSISTANT

## 2023-07-31 PROCEDURE — 3008F BODY MASS INDEX DOCD: CPT | Performed by: PHYSICIAN ASSISTANT

## 2023-07-31 RX ORDER — PHENTERMINE HYDROCHLORIDE 15 MG/1
15 CAPSULE ORAL EVERY MORNING
Qty: 30 CAPSULE | Refills: 2 | Status: SHIPPED | OUTPATIENT
Start: 2023-07-31

## 2023-07-31 RX ORDER — PHENTERMINE HYDROCHLORIDE 37.5 MG/1
37.5 TABLET ORAL
Qty: 30 TABLET | Refills: 2 | Status: CANCELLED | OUTPATIENT
Start: 2023-07-31

## 2023-07-31 RX ORDER — SEMAGLUTIDE 0.68 MG/ML
0.5 INJECTION, SOLUTION SUBCUTANEOUS WEEKLY
Qty: 9 ML | Refills: 0 | Status: SHIPPED | OUTPATIENT
Start: 2023-07-31

## 2023-08-10 ENCOUNTER — MED REC SCAN ONLY (OUTPATIENT)
Dept: FAMILY MEDICINE CLINIC | Facility: CLINIC | Age: 58
End: 2023-08-10

## 2023-08-16 ENCOUNTER — TELEPHONE (OUTPATIENT)
Dept: INTERNAL MEDICINE CLINIC | Facility: CLINIC | Age: 58
End: 2023-08-16

## 2023-08-21 NOTE — TELEPHONE ENCOUNTER
Ozempic approved  Approved    Prior authorization approved   Case ID: [id number]      Payer: [address] [place] [address] Road    [phone]   The case has been Approved from  [id number]   Approval Details    Authorized from August 18, 2023 to August 18, 2024      Electronic appeal: Not supported   View History    Medication Being Authorized     semaglutide (OZEMPIC, 0.25 OR 0.5 MG/DOSE,) 2 MG/3ML Subcutaneous Solution Pen-injector

## 2023-09-15 ENCOUNTER — HOSPITAL ENCOUNTER (OUTPATIENT)
Age: 58
Discharge: HOME | End: 2023-09-15
Payer: COMMERCIAL

## 2023-09-15 VITALS
RESPIRATION RATE: 18 BRPM | TEMPERATURE: 98 F | HEART RATE: 89 BPM | DIASTOLIC BLOOD PRESSURE: 77 MMHG | OXYGEN SATURATION: 97 % | WEIGHT: 180 LBS | HEIGHT: 65 IN | BODY MASS INDEX: 29.99 KG/M2 | SYSTOLIC BLOOD PRESSURE: 132 MMHG

## 2023-09-15 DIAGNOSIS — U07.1 COVID-19: Primary | ICD-10-CM

## 2023-09-18 ENCOUNTER — OFFICE VISIT (OUTPATIENT)
Dept: FAMILY MEDICINE CLINIC | Facility: CLINIC | Age: 58
End: 2023-09-18

## 2023-09-18 VITALS
WEIGHT: 179.19 LBS | HEIGHT: 65 IN | DIASTOLIC BLOOD PRESSURE: 78 MMHG | HEART RATE: 94 BPM | SYSTOLIC BLOOD PRESSURE: 124 MMHG | BODY MASS INDEX: 29.85 KG/M2

## 2023-09-18 DIAGNOSIS — H92.02 LEFT EAR PAIN: Primary | ICD-10-CM

## 2023-09-18 DIAGNOSIS — E11.9 TYPE 2 DIABETES MELLITUS WITHOUT RETINOPATHY (HCC): ICD-10-CM

## 2023-09-18 PROCEDURE — 3008F BODY MASS INDEX DOCD: CPT | Performed by: FAMILY MEDICINE

## 2023-09-18 PROCEDURE — 99213 OFFICE O/P EST LOW 20 MIN: CPT | Performed by: FAMILY MEDICINE

## 2023-09-18 PROCEDURE — 3074F SYST BP LT 130 MM HG: CPT | Performed by: FAMILY MEDICINE

## 2023-09-18 PROCEDURE — 3078F DIAST BP <80 MM HG: CPT | Performed by: FAMILY MEDICINE

## 2023-09-18 RX ORDER — PREDNISONE 20 MG/1
40 TABLET ORAL DAILY
Qty: 10 TABLET | Refills: 0 | Status: SHIPPED | OUTPATIENT
Start: 2023-09-18 | End: 2023-09-23

## 2023-09-18 RX ORDER — AMOXICILLIN 875 MG/1
875 TABLET, COATED ORAL 2 TIMES DAILY
Qty: 20 TABLET | Refills: 0 | Status: SHIPPED | OUTPATIENT
Start: 2023-09-18 | End: 2023-09-28

## 2023-10-14 ENCOUNTER — IMMUNIZATION (OUTPATIENT)
Dept: LAB | Age: 58
End: 2023-10-14
Attending: EMERGENCY MEDICINE
Payer: COMMERCIAL

## 2023-10-14 DIAGNOSIS — Z23 NEED FOR VACCINATION: Primary | ICD-10-CM

## 2023-10-14 PROCEDURE — 90686 IIV4 VACC NO PRSV 0.5 ML IM: CPT

## 2023-10-14 PROCEDURE — 90480 ADMN SARSCOV2 VAC 1/ONLY CMP: CPT

## 2023-10-14 PROCEDURE — 90471 IMMUNIZATION ADMIN: CPT

## 2023-10-30 ENCOUNTER — OFFICE VISIT (OUTPATIENT)
Dept: INTERNAL MEDICINE CLINIC | Facility: CLINIC | Age: 58
End: 2023-10-30

## 2023-10-30 VITALS
SYSTOLIC BLOOD PRESSURE: 124 MMHG | RESPIRATION RATE: 16 BRPM | BODY MASS INDEX: 32.37 KG/M2 | DIASTOLIC BLOOD PRESSURE: 78 MMHG | WEIGHT: 185 LBS | HEIGHT: 63.5 IN | HEART RATE: 88 BPM

## 2023-10-30 DIAGNOSIS — E11.9 TYPE 2 DIABETES MELLITUS WITHOUT RETINOPATHY (HCC): ICD-10-CM

## 2023-10-30 DIAGNOSIS — Z51.81 ENCOUNTER FOR THERAPEUTIC DRUG LEVEL MONITORING: Primary | ICD-10-CM

## 2023-10-30 DIAGNOSIS — G47.33 OSA ON CPAP: ICD-10-CM

## 2023-10-30 DIAGNOSIS — E66.9 CLASS 1 OBESITY WITH SERIOUS COMORBIDITY AND BODY MASS INDEX (BMI) OF 32.0 TO 32.9 IN ADULT, UNSPECIFIED OBESITY TYPE: ICD-10-CM

## 2023-10-30 DIAGNOSIS — E78.00 HYPERCHOLESTEROLEMIA: ICD-10-CM

## 2023-10-30 PROCEDURE — 3008F BODY MASS INDEX DOCD: CPT | Performed by: PHYSICIAN ASSISTANT

## 2023-10-30 PROCEDURE — 99214 OFFICE O/P EST MOD 30 MIN: CPT | Performed by: PHYSICIAN ASSISTANT

## 2023-10-30 PROCEDURE — 3078F DIAST BP <80 MM HG: CPT | Performed by: PHYSICIAN ASSISTANT

## 2023-10-30 PROCEDURE — 3074F SYST BP LT 130 MM HG: CPT | Performed by: PHYSICIAN ASSISTANT

## 2023-10-30 RX ORDER — TIRZEPATIDE 2.5 MG/.5ML
2.5 INJECTION, SOLUTION SUBCUTANEOUS WEEKLY
Qty: 2 ML | Refills: 0 | Status: SHIPPED | OUTPATIENT
Start: 2023-10-30

## 2023-10-30 RX ORDER — PHENTERMINE HYDROCHLORIDE 37.5 MG/1
37.5 TABLET ORAL
Qty: 30 TABLET | Refills: 2 | Status: SHIPPED | OUTPATIENT
Start: 2023-10-30

## 2023-10-30 RX ORDER — ATORVASTATIN CALCIUM 10 MG/1
10 TABLET, FILM COATED ORAL NIGHTLY
COMMUNITY
Start: 2023-08-26

## 2023-10-30 RX ORDER — FLUOCINOLONE ACETONIDE, HYDROQUINONE, AND TRETINOIN .1; 40; .5 MG/G; MG/G; MG/G
1 CREAM TOPICAL 2 TIMES DAILY
COMMUNITY
Start: 2023-08-03

## 2023-10-31 ENCOUNTER — TELEPHONE (OUTPATIENT)
Dept: INTERNAL MEDICINE CLINIC | Facility: CLINIC | Age: 58
End: 2023-10-31

## 2023-10-31 ENCOUNTER — PATIENT MESSAGE (OUTPATIENT)
Dept: FAMILY MEDICINE CLINIC | Facility: CLINIC | Age: 58
End: 2023-10-31

## 2023-10-31 NOTE — TELEPHONE ENCOUNTER
PA needed for Mounjaro 2.5 mg from [facility]  [facility]   [id number]    NEED TO ENTER

## 2023-11-03 ENCOUNTER — NURSE TRIAGE (OUTPATIENT)
Dept: FAMILY MEDICINE CLINIC | Facility: CLINIC | Age: 58
End: 2023-11-03

## 2023-11-03 NOTE — TELEPHONE ENCOUNTER
Action Requested: Summary for Provider     []  Critical Lab, Recommendations Needed  [] Need Additional Advice  [x]   FYI    []   Need Orders  [] Need Medications Sent to Pharmacy  []  Other     SUMMARY: Per Protocol, patient was advised to follow up with Dr. Oneyda Hatch for further recommendations as patient has not been prescribed Meloxicam (patient requesting medication refill) since 2020. Next appointment with Dr. Oneyda Hatch is in December for this issue. Reason for call: Pain  Onset: approx.  2 weeks        Reason for Disposition   MILD pain (e.g., does not interfere with normal activities) and present > 7 days    Protocols used: Muscle Aches and Body Pain-A-[place]    Patient complains of: Left shoulder, left knee, left hip pain - believes it may be arthritis related to cold  Hip and knee worse in the morning and while sitting to standing  Once walking pain decreases    Shoulder worse at night, better when covered with a blanket and kept warm    Per patient, pain Interferes somewhat with daily activities    Patient has been taking over the counter Tylenol 325mg 2 tablets at night with some relief  Occasionlly muscle relaxers

## 2023-11-08 ENCOUNTER — PATIENT MESSAGE (OUTPATIENT)
Dept: PHYSICAL MEDICINE AND REHAB | Facility: CLINIC | Age: 58
End: 2023-11-08

## 2023-11-16 ENCOUNTER — OFFICE VISIT (OUTPATIENT)
Dept: PHYSICAL MEDICINE AND REHAB | Facility: CLINIC | Age: 58
End: 2023-11-16
Payer: COMMERCIAL

## 2023-11-16 VITALS — OXYGEN SATURATION: 97 % | BODY MASS INDEX: 32.37 KG/M2 | HEART RATE: 102 BPM | HEIGHT: 63.5 IN | WEIGHT: 185 LBS

## 2023-11-16 DIAGNOSIS — M70.61 GREATER TROCHANTERIC BURSITIS OF BOTH HIPS: Primary | ICD-10-CM

## 2023-11-16 DIAGNOSIS — M51.37 DDD (DEGENERATIVE DISC DISEASE), LUMBOSACRAL: ICD-10-CM

## 2023-11-16 DIAGNOSIS — M70.62 GREATER TROCHANTERIC BURSITIS OF BOTH HIPS: Primary | ICD-10-CM

## 2023-11-16 DIAGNOSIS — M47.816 LUMBAR SPONDYLOSIS: ICD-10-CM

## 2023-11-16 DIAGNOSIS — S16.1XXA NECK STRAIN: ICD-10-CM

## 2023-11-16 DIAGNOSIS — M54.59 MECHANICAL LOW BACK PAIN: ICD-10-CM

## 2023-11-16 DIAGNOSIS — M54.16 LUMBAR RADICULOPATHY: ICD-10-CM

## 2023-11-16 DIAGNOSIS — S76.019A STRAIN OF GLUTEUS MEDIUS, UNSPECIFIED LATERALITY, INITIAL ENCOUNTER: ICD-10-CM

## 2023-11-16 PROCEDURE — 20611 DRAIN/INJ JOINT/BURSA W/US: CPT | Performed by: PHYSICAL MEDICINE & REHABILITATION

## 2023-11-16 PROCEDURE — 99214 OFFICE O/P EST MOD 30 MIN: CPT | Performed by: PHYSICAL MEDICINE & REHABILITATION

## 2023-11-16 PROCEDURE — 3008F BODY MASS INDEX DOCD: CPT | Performed by: PHYSICAL MEDICINE & REHABILITATION

## 2023-11-16 RX ORDER — TRIAMCINOLONE ACETONIDE 40 MG/ML
40 INJECTION, SUSPENSION INTRA-ARTICULAR; INTRAMUSCULAR ONCE
Status: COMPLETED | OUTPATIENT
Start: 2023-11-16 | End: 2023-11-16

## 2023-11-16 RX ORDER — CYCLOBENZAPRINE HCL 10 MG
10 TABLET ORAL NIGHTLY PRN
Qty: 30 TABLET | Refills: 0 | Status: SHIPPED | OUTPATIENT
Start: 2023-11-16

## 2023-11-16 RX ORDER — LIDOCAINE HYDROCHLORIDE 10 MG/ML
5 INJECTION, SOLUTION INFILTRATION; PERINEURAL ONCE
Status: COMPLETED | OUTPATIENT
Start: 2023-11-16 | End: 2023-11-16

## 2023-11-16 RX ADMIN — TRIAMCINOLONE ACETONIDE 40 MG: 40 INJECTION, SUSPENSION INTRA-ARTICULAR; INTRAMUSCULAR at 14:22:00

## 2023-11-16 RX ADMIN — LIDOCAINE HYDROCHLORIDE 5 ML: 10 INJECTION, SOLUTION INFILTRATION; PERINEURAL at 14:26:00

## 2023-11-16 NOTE — PROCEDURES
[address] Injection Procedure Note    CHIEF COMPLAINT:    Chief Complaint   Patient presents with    Low Back Pain     LOV: 7/27/2023 Patient f/u on lateral left hip pain that radiates into knee with N/T with cramping. HEP. Takes Tylenol. Rates pain 8/10. PROCEDURE PERFORMED:  Left greater trochanter bursae corticosteroid injection under ultrasound guidance    INDICATIONS:  Left greater trochanteric bursitis    PRIMARY PROCEDURALIST:  Fadumo Corona MD    INFORMED CONSENT & TIME OUT:   As documented in the Time Out and Pre-Procedure Check Lists.   Verbal consent was obtained    Vitals: [unfilled]  Labs (document last wbc, plts, hgb, and PT/INR):    Office Visit on 09/18/2023   Component Date Value Ref Range Status    Malb/Cre Calc 06/02/2023 17.0  ug/mg Final    TSH 06/02/2023 1.040  mIU/mL Final    WBC 06/02/2023 6.8  x10(3)uL Final    HGB 06/02/2023 16.4   Final    HCT 06/02/2023 49.6   Final    MCV 06/02/2023 83   Final    PLT 06/02/2023 333   Final    HgbA1C 06/02/2023 6.1  % Final    Cholesterol, Total 06/02/2023 214  mg/dL Final    HDL Cholesterol 06/02/2023 66  mg/dL Final    Direct LDL 06/02/2023 124  mg/dL Final    Triglycerides 06/02/2023 140  mg/dL Final    VLDL 06/02/2023 24  mg/dL Final    Non HDL Chol 06/02/2023 148  mg/dL Final    CREATININE 06/02/2023 0.71  mg/dL Final    Creatinine, Urine 06/02/2023 75.4  mg/dL Final    Glucose 06/02/2023 86  mg/dL Final    BUN 06/02/2023 15  mg/dL Final    Bilirubin, Total 06/02/2023 0.5  mg/dL Final    AST 06/02/2023 16  U/L Final    ALT 06/02/2023 16  U/L Final    CALCIUM 06/02/2023 9.4   Final    eGFR non- 06/02/2023 100   Final    TP 06/02/2023 7.2   Final    Microalbumin, Urine, Calc 06/02/2023 13  mg/24hr Final    Urine Color 06/02/2023 Yellow   Final    Urine Clarity 06/02/2023 Clear   Final    Specific Gravity 06/02/2023 1.030   Final    Glucose Urine 06/02/2023 300 Final    Bilirubin Urine 06/02/2023 Negative   Final    Ketones Urine 06/02/2023 Negative   Final    Blood Urine 06/02/2023 Negative   Final    pH Urine 06/02/2023 6.0   Final    Protein Urine 06/02/2023 Negative   Final    Urobilinogen Urine 06/02/2023 0.2   Final    Nitrite Urine 06/02/2023 Negative   Final    Leukocyte Esterase Urine 06/02/2023 Negative   Final    WBC Urine 06/02/2023 0-5   Final    RBC Urine 06/02/2023 None Seen   Final   ]    PROCEDURE:    The procedure risks, benefits, and alternatives were discussed with patient. Patient verbalized understanding and gave verbal consent. Next, the Left greater trochanter was prepped and draped in usual sterile fashion. Using a low-frequency curvilinear probe, the Left greater trochanter was identified on ultrasound. Next, a 25-gauge 2.5 inch needle was  introduced and advanced until seen accessing the Left greater trochanter. A total of 2 cc of 1% lidocaine was used to anesthetize skin and deep soft tissues. Next, the syringe was then swapped out for a mixture containing 2 cc of 1% lidocaine and 1 cc of 40 mg /cc of Kenalog . The mixture was then injected. Flow was noted within the Left greater trochanter. The needle was then retracted, hemostasis was obtained. Area was cleansed and a dry dressing in place. The patient tolerated the procedure well. No immediate complications were noted. Patient verbalized understanding of assessment and plan. Patient is in agreement with the plan. All questions were answered. No barriers to learning identified. Permanent pictures were saved. INSTRUCTIONS GIVEN TO PATIENT:    \"You will see an effect in the next 2-3 days. Please contact me if you have fevers, worsening swelling, worsening pain, decreased range of motion, increased redness, chills, or anything that makes you concerned about how the joint we injected feels/looks.   If you do not reach me in a reasonable time, please report directly to the emergency room for further evaluation\"        Deepak Sanchez.  Susan Núñez MD, [facility]  Physical Medicine and Rehabilitation/Sports Medicine  MEDICAL CENTER [facility]

## 2023-11-22 ENCOUNTER — TELEPHONE (OUTPATIENT)
Dept: PHYSICAL MEDICINE AND REHAB | Facility: CLINIC | Age: 58
End: 2023-11-22

## 2023-11-22 NOTE — TELEPHONE ENCOUNTER
Initiated authorization for LEFT GTB Csi under ultrasound guidance procedure.  Dx code M70.60 with [employer] CPT Code 77060, [id number]   Reference # [id number]   BRAFB 11/22/23-02/22/24  Status No Auth required

## 2023-11-26 DIAGNOSIS — Z51.81 ENCOUNTER FOR THERAPEUTIC DRUG LEVEL MONITORING: ICD-10-CM

## 2023-11-26 DIAGNOSIS — E11.9 TYPE 2 DIABETES MELLITUS WITHOUT RETINOPATHY (HCC): ICD-10-CM

## 2023-11-27 RX ORDER — TIRZEPATIDE 2.5 MG/.5ML
INJECTION, SOLUTION SUBCUTANEOUS
Qty: 2 ML | Refills: 0 | OUTPATIENT
Start: 2023-11-27

## 2023-11-27 NOTE — TELEPHONE ENCOUNTER
Requesting   Requested Prescriptions     Pending Prescriptions Disp Refills    MOUNJARO 2.5 MG/0.5ML Subcutaneous Solution Pen-injector [Pharmacy Med Name: Dotty Mora 2.5MG/0.5ML PF PEN INJ] 2 mL 0     Sig: ADMINISTER 2.5 MG UNDER THE SKIN 1 TIME A WEEK     LOV: 10/30/23  RTC: not noted  Filled: 10/30/23 #2 with 0 refills    Future Appointments   Date Time Provider Juliet Robbins   11/30/2023  2:00 PM NICOLE Pete

## 2023-11-28 RX ORDER — TIRZEPATIDE 5 MG/.5ML
5 INJECTION, SOLUTION SUBCUTANEOUS WEEKLY
Qty: 2 ML | Refills: 0 | Status: SHIPPED | OUTPATIENT
Start: 2023-11-28

## 2023-12-07 ENCOUNTER — PATIENT MESSAGE (OUTPATIENT)
Dept: FAMILY MEDICINE CLINIC | Facility: CLINIC | Age: 58
End: 2023-12-07

## 2023-12-07 ENCOUNTER — NURSE TRIAGE (OUTPATIENT)
Dept: FAMILY MEDICINE CLINIC | Facility: CLINIC | Age: 58
End: 2023-12-07

## 2023-12-07 NOTE — TELEPHONE ENCOUNTER
Action Requested: Summary for Provider     []  Critical Lab, Recommendations Needed  [] Need Additional Advice  []   FYI    []   Need Orders  [] Need Medications Sent to Pharmacy  []  Other     SUMMARY: Per protocol advised :  Go to IC       Reason for call: Diarrhea  Onset: Data Unavailable    Patient calling ( identified name and  ) in regards to MyChart message below    Remains with diarrhea since , having 5-7  episodes per day , hard to eat , even crackers give her abdominal pain , bloated , burping, passing flatus   Pain is located above of umbilical area and to the right     No FM appointments available at any location     Advised to go to ER or IC   Patient lives in [place], [place] go to [address]     Patient verbalizes understanding and agrees with plan. Good Morning Dr. Audrey Saldaña  I been having stomach pain and diarrhea since   I have taken pepto bismol for diarrhea and the pain calms down but when I eat something my stomach hurts a lot.   I don't know if I have an infection,   because I been like this since   Reason for Disposition   Constant abdominal pain lasting > 2 hours    Protocols used: Camron Montes

## 2023-12-10 ENCOUNTER — HOSPITAL ENCOUNTER (OUTPATIENT)
Age: 58
Discharge: HOME | End: 2023-12-10
Payer: COMMERCIAL

## 2023-12-10 ENCOUNTER — APPOINTMENT (OUTPATIENT)
Dept: CT IMAGING | Age: 58
End: 2023-12-10
Attending: NURSE PRACTITIONER
Payer: COMMERCIAL

## 2023-12-10 VITALS
OXYGEN SATURATION: 98 % | BODY MASS INDEX: 29.16 KG/M2 | HEART RATE: 70 BPM | RESPIRATION RATE: 16 BRPM | WEIGHT: 175 LBS | TEMPERATURE: 97 F | HEIGHT: 65 IN | DIASTOLIC BLOOD PRESSURE: 86 MMHG | SYSTOLIC BLOOD PRESSURE: 133 MMHG

## 2023-12-10 DIAGNOSIS — A08.4 VIRAL ENTERITIS: Primary | ICD-10-CM

## 2023-12-10 LAB
#MXD IC: 0.5 X10ˆ3/UL (ref 0.1–1)
BILIRUB UR QL STRIP: NEGATIVE
BUN BLD-MCNC: 14 MG/DL (ref 7–18)
CHLORIDE BLD-SCNC: 103 MMOL/L (ref 98–112)
CLARITY UR: CLEAR
CO2 BLD-SCNC: 28 MMOL/L (ref 21–32)
COLOR UR: YELLOW
CREAT BLD-MCNC: 0.7 MG/DL
EGFRCR SERPLBLD CKD-EPI 2021: 101 ML/MIN/1.73M2 (ref 60–?)
GLUCOSE BLD-MCNC: 86 MG/DL (ref 70–99)
GLUCOSE UR STRIP-MCNC: 100 MG/DL
HCT VFR BLD AUTO: 50.3 %
HCT VFR BLD CALC: 51 %
HGB BLD-MCNC: 16.2 G/DL
HGB UR QL STRIP: NEGATIVE
ISTAT IONIZED CALCIUM FOR CHEM 8: 1.17 MMOL/L (ref 1.12–1.32)
KETONES UR STRIP-MCNC: NEGATIVE MG/DL
LEUKOCYTE ESTERASE UR QL STRIP: NEGATIVE
LYMPHOCYTES # BLD AUTO: 2.5 X10ˆ3/UL (ref 1–4)
LYMPHOCYTES NFR BLD AUTO: 36 %
MCH RBC QN AUTO: 27.5 PG (ref 26–34)
MCHC RBC AUTO-ENTMCNC: 32.2 G/DL (ref 31–37)
MCV RBC AUTO: 85.3 FL (ref 80–100)
MIXED CELL %: 7.7 %
NEUTROPHILS # BLD AUTO: 4 X10ˆ3/UL (ref 1.5–7.7)
NEUTROPHILS NFR BLD AUTO: 56.3 %
NITRITE UR QL STRIP: NEGATIVE
PH UR STRIP: 5.5 [PH]
PLATELET # BLD AUTO: 341 X10ˆ3/UL (ref 150–450)
POTASSIUM BLD-SCNC: 3.5 MMOL/L (ref 3.6–5.1)
PROT UR STRIP-MCNC: 30 MG/DL
RBC # BLD AUTO: 5.9 X10ˆ6/UL
SODIUM BLD-SCNC: 141 MMOL/L (ref 136–145)
SP GR UR STRIP: >=1.03
UROBILINOGEN UR STRIP-ACNC: <2 MG/DL
WBC # BLD AUTO: 7 X10ˆ3/UL (ref 4–11)

## 2023-12-10 PROCEDURE — 74177 CT ABD & PELVIS W/CONTRAST: CPT | Performed by: NURSE PRACTITIONER

## 2023-12-10 PROCEDURE — 80047 BASIC METABLC PNL IONIZED CA: CPT | Performed by: NURSE PRACTITIONER

## 2023-12-10 PROCEDURE — 96365 THER/PROPH/DIAG IV INF INIT: CPT | Performed by: NURSE PRACTITIONER

## 2023-12-10 PROCEDURE — 99214 OFFICE O/P EST MOD 30 MIN: CPT | Performed by: NURSE PRACTITIONER

## 2023-12-10 PROCEDURE — 81002 URINALYSIS NONAUTO W/O SCOPE: CPT | Performed by: NURSE PRACTITIONER

## 2023-12-10 PROCEDURE — 85025 COMPLETE CBC W/AUTO DIFF WBC: CPT | Performed by: NURSE PRACTITIONER

## 2023-12-10 RX ORDER — DICYCLOMINE HCL 20 MG
20 TABLET ORAL 4 TIMES DAILY PRN
Qty: 30 TABLET | Refills: 0 | Status: SHIPPED | OUTPATIENT
Start: 2023-12-10 | End: 2024-01-09

## 2023-12-10 RX ORDER — KETOROLAC TROMETHAMINE 30 MG/ML
30 INJECTION, SOLUTION INTRAMUSCULAR; INTRAVENOUS ONCE
Status: COMPLETED | OUTPATIENT
Start: 2023-12-10 | End: 2023-12-10

## 2023-12-10 RX ORDER — SODIUM CHLORIDE 9 MG/ML
1000 INJECTION, SOLUTION INTRAVENOUS ONCE
Status: COMPLETED | OUTPATIENT
Start: 2023-12-10 | End: 2023-12-10

## 2023-12-10 NOTE — ED INITIAL ASSESSMENT (HPI)
Pt sts 9 days ago began with mid abd pain. Diarrhea began 7 days ago. .2 days ago pain increased, radiates to lower abd,. and developed nausea. Denies vomiting or urinary symptoms.

## 2023-12-10 NOTE — DISCHARGE INSTRUCTIONS
Rest and push plenty of fluids. Take it easy on your stomach over the next few days. Keep to a bland diet and avoid any spicy, greasy, citrus, or fried foods. Try taking the Bentyl as prescribed to help with crampy abdominal pain and diarrhea. Take Pepcid over the counter 20 mg twice a day for the next few days. Follow up with your PCP in 5-7 days. Thank you for choosing [facility] for your care.

## 2023-12-11 ENCOUNTER — LAB ENCOUNTER (OUTPATIENT)
Dept: LAB | Age: 58
End: 2023-12-11
Attending: NURSE PRACTITIONER
Payer: COMMERCIAL

## 2023-12-11 DIAGNOSIS — A08.4 VIRAL ENTERITIS: ICD-10-CM

## 2023-12-11 PROCEDURE — 82272 OCCULT BLD FECES 1-3 TESTS: CPT | Performed by: NURSE PRACTITIONER

## 2023-12-11 PROCEDURE — 87045 FECES CULTURE AEROBIC BACT: CPT | Performed by: NURSE PRACTITIONER

## 2023-12-11 PROCEDURE — 87329 GIARDIA AG IA: CPT | Performed by: NURSE PRACTITIONER

## 2023-12-11 PROCEDURE — 87046 STOOL CULTR AEROBIC BACT EA: CPT | Performed by: NURSE PRACTITIONER

## 2023-12-11 PROCEDURE — 87493 C DIFF AMPLIFIED PROBE: CPT | Performed by: NURSE PRACTITIONER

## 2023-12-11 PROCEDURE — 87427 SHIGA-LIKE TOXIN AG IA: CPT | Performed by: NURSE PRACTITIONER

## 2023-12-11 PROCEDURE — 87272 CRYPTOSPORIDIUM AG IF: CPT | Performed by: NURSE PRACTITIONER

## 2023-12-12 LAB
C DIFF TOX B STL QL: NEGATIVE
CRYPTOSP AG STL QL IA: NEGATIVE
G LAMBLIA AG STL QL IA: NEGATIVE

## 2023-12-26 ENCOUNTER — OFFICE VISIT (OUTPATIENT)
Dept: PODIATRY CLINIC | Facility: CLINIC | Age: 58
End: 2023-12-26

## 2023-12-26 DIAGNOSIS — E11.9 ENCOUNTER FOR DIABETIC FOOT EXAM (HCC): ICD-10-CM

## 2023-12-26 DIAGNOSIS — E11.9 TYPE 2 DIABETES MELLITUS WITHOUT RETINOPATHY (HCC): Primary | ICD-10-CM

## 2023-12-26 PROCEDURE — 99203 OFFICE O/P NEW LOW 30 MIN: CPT | Performed by: PODIATRIST

## 2023-12-26 NOTE — PROGRESS NOTES
Crystal Tomas is a 63year old female. Chief Complaint   Patient presents with    Diabetic Foot Care     Diabetic foot check. No FBS taken today. A1C 6.1 on 6/2. HPI:   This pleasant patient presents to the clinic for diabetic foot exam.  She was referred in by her PCP she states she is under good control. She has been diabetic now for 2 to 3 years. At today's visit reviewed nurse's history as taken above, allergies medications and medical history as documented below. All changes duly noted  Allergies: Clindamycin   Current Outpatient Medications   Medication Sig Dispense Refill    dicyclomine 20 MG Oral Tab Take 1 tablet (20 mg total) by mouth 4 (four) times daily as needed. 30 tablet 0    Tirzepatide (MOUNJARO) 5 MG/0.5ML Subcutaneous Solution Pen-injector Inject 5 mg into the skin once a week. 2 mL 0    cyclobenzaprine 10 MG Oral Tab Take 1 tablet (10 mg total) by mouth nightly as needed. 30 tablet 0    TRI-MALIKA 0.01-4-0.05 % External Cream Apply 1 Application topically 2 (two) times daily. atorvastatin 10 MG Oral Tab Take 1 tablet (10 mg total) by mouth nightly. Phentermine HCl 37.5 MG Oral Tab Take 1 tablet (37.5 mg total) by mouth before breakfast. 30 tablet 2    Tirzepatide (MOUNJARO) 2.5 MG/0.5ML Subcutaneous Solution Pen-injector Inject 2.5 mg into the skin once a week. 2 mL 0    semaglutide 2 MG/3ML Subcutaneous Solution Pen-injector Inject into the skin once a week. Sample  [id number]  1-31-25      semaglutide (OZEMPIC, 0.25 OR 0.5 MG/DOSE,) 2 MG/3ML Subcutaneous Solution Pen-injector Inject 0.5 mg into the skin once a week. 9 mL 0    Phentermine HCl 15 MG Oral Cap Take 1 capsule (15 mg total) by mouth every morning. 30 capsule 2    pantoprazole 40 MG Oral Tab EC Take 1 tablet (40 mg total) by mouth before breakfast. 90 tablet 3    Empagliflozin (JARDIANCE) 25 MG Oral Tab Take 25 mg by mouth daily.  90 tablet 4    naproxen 500 MG Oral Tab Take 1 tablet (500 mg total) by mouth 2 (two) times daily with meals. 60 tablet 0    fluticasone propionate 50 MCG/ACT Nasal Suspension 2 sprays by Nasal route daily. 48 g 2    Cholecalciferol (VITAMIN D3) 1.25 MG (47332 UT) Oral Cap Take 1 capsule by mouth once a week. 12 capsule 4    levocetirizine 5 MG Oral Tab Take 1 tablet (5 mg total) by mouth every evening. 90 tablet 1    Estradiol (ESTRACE) 0.1 MG/GM Vaginal Cream Apply 1/2 gram vaginally 2 times per week. 1 Tube 3      Past Medical History:   Diagnosis Date    Anesthesia complication     Biliary calculus     Difficult intubation     Difficulty opening mouth wide during general anesthesia    Esophageal reflux     Hyperlipidemia     Lipid screening 2014    per NG    Other ill-defined conditions(799.89)     per [facility]:  \"Abnormal pap smear; Management:  colposcopy x3\"    Type 2 diabetes mellitus without retinopathy ([facility] [address] 2021    Vitamin D deficiency       Past Surgical History:   Procedure Laterality Date    CHOLECYSTECTOMY      COLONOSCOPY      COLONOSCOPY N/A 2018    few diverticula    EGD  2018    small HH, H Pylori gastritis    EGD      ERCP DUCT STENT PLACEMENT  2014    bile leak    HYSTERECTOMY  1992    partial hystero.     OTHER SURGICAL HISTORY      Bladder surgery      Family History   Problem Relation Age of Onset    Cancer Mother         Uterus Cancer; age 36 cause of death    Diabetes Father     Cancer Father         Lung CA    Breast Cancer Sister 52    Glaucoma Neg     Macular degeneration Neg       Social History     Socioeconomic History    Marital status:    Tobacco Use    Smoking status: Former     Packs/day: 0.00     Years: 18.00     Additional pack years: 0.00     Total pack years: 0.00     Types: Cigarettes     Start date: 1999     Quit date: 2012     Years since quittin.4    Smokeless tobacco: Never   Vaping Use    Vaping Use: Never used   Substance and Sexual Activity    Alcohol use: Not Currently     Comment: socially Drug use: No    Sexual activity: Yes   Other Topics Concern    Caffeine Concern Yes     Comment: Coffee: 1 cup daily    Exercise Yes     Comment: Walking    Pt has a pacemaker No    Pt has a defibrillator No    Reaction to local anesthetic No           REVIEW OF SYSTEMS:   Today reviewed systens as documented below  GENERAL HEALTH: feels well otherwise  SKIN: Refer to exam below  RESPIRATORY: denies shortness of breath with exertion  CARDIOVASCULAR: denies chest pain on exertion  GI: denies abdominal pain and denies heartburn  NEURO: denies headaches    EXAM:   There were no vitals taken for this visit. GENERAL: well developed, well nourished, in no apparent distress  EXTREMITIES:   1. Integument: The skin on her feet is warm dry and supple the skin is intact there are no deformities or defects noted. The patient has normal thickness for her toenails I could not assess coloration because they are currently painted. 2. Vascular: Patient has palpable pulses dorsalis pedis posterior tibial bilaterally are symmetrical and equivocal no deficits are noted cap return the pedal digits is brisk. 3. Neurologic: Patient has 2 out of 4 Achilles tendon reflexes. She has normal sharp dull discrimination she has pain sensation intact and she can feel the [place] 10 g filament in all dermatomes. 4. Musculoskeletal: Muscle groups affecting the foot and ankle fired 5 out of 5 against resistance there are no foot deformities or digital deformities noted at this visit. ASSESSMENT AND PLAN:   Diagnoses and all orders for this visit:    Type 2 diabetes mellitus without retinopathy ([address]    Encounter for diabetic foot exam ([facility] [address]        Plan: Today refer the patient to the American diabetes Association website recommend that she follow their foot care instructions come and see us as soon as possible and I would recommend a yearly follow-up for this visit.     The patient indicates understanding of these issues and agrees to the plan.     Lance Ahumada, DPM

## 2023-12-28 DIAGNOSIS — E11.9 TYPE 2 DIABETES MELLITUS WITHOUT RETINOPATHY (HCC): ICD-10-CM

## 2023-12-28 RX ORDER — TIRZEPATIDE 5 MG/.5ML
5 INJECTION, SOLUTION SUBCUTANEOUS WEEKLY
Qty: 2 ML | Refills: 0 | Status: SHIPPED | OUTPATIENT
Start: 2023-12-28

## 2023-12-28 NOTE — TELEPHONE ENCOUNTER
Requesting   Requested Prescriptions     Pending Prescriptions Disp Refills    MOUNJARO 5 MG/0.5ML Subcutaneous Solution Pen-injector [Pharmacy Med Name: Karen Gonzalez 5MG/0.5ML PF PEN INJ] 2 mL 0     Sig: ADMINISTER 5 MG UNDER THE SKIN 1 TIME A WEEK     LOV: 10/30/23  RTC: not noted  Filled: 11/28/23 #2 with 0 refills    Future Appointments   Date Time Provider [facility]   1/31/2024  2:15 PM Roberto Bee MD [facility]   4/12/2024 12:20 PM Josefa Ching PA-C [facility] [address]   12/30/2024  9:00 AM Roseann Strauss DPM Fredie Pinal

## 2024-02-05 DIAGNOSIS — M54.59 MECHANICAL LOW BACK PAIN: ICD-10-CM

## 2024-02-05 DIAGNOSIS — E78.5 HYPERLIPIDEMIA, UNSPECIFIED HYPERLIPIDEMIA TYPE: ICD-10-CM

## 2024-02-05 DIAGNOSIS — M51.16 LUMBAR DISC HERNIATION WITH RADICULOPATHY: ICD-10-CM

## 2024-02-05 DIAGNOSIS — M48.061 LUMBAR FORAMINAL STENOSIS: ICD-10-CM

## 2024-02-05 DIAGNOSIS — M51.36 BULGE OF LUMBAR DISC WITHOUT MYELOPATHY: ICD-10-CM

## 2024-02-05 DIAGNOSIS — M22.2X9 PATELLOFEMORAL PAIN SYNDROME, UNSPECIFIED LATERALITY: ICD-10-CM

## 2024-02-05 DIAGNOSIS — M47.816 FACET SYNDROME, LUMBAR: ICD-10-CM

## 2024-02-05 DIAGNOSIS — M47.816 LUMBAR SPONDYLOSIS: ICD-10-CM

## 2024-02-05 DIAGNOSIS — M51.37 DDD (DEGENERATIVE DISC DISEASE), LUMBOSACRAL: ICD-10-CM

## 2024-02-05 DIAGNOSIS — M17.10 PRIMARY OSTEOARTHRITIS OF KNEE, UNSPECIFIED LATERALITY: ICD-10-CM

## 2024-02-05 DIAGNOSIS — M54.59 LUMBAR TRIGGER POINT SYNDROME: ICD-10-CM

## 2024-02-05 RX ORDER — NAPROXEN 500 MG/1
500 TABLET ORAL 2 TIMES DAILY WITH MEALS
Qty: 60 TABLET | Refills: 0 | Status: SHIPPED | OUTPATIENT
Start: 2024-02-05

## 2024-02-05 NOTE — TELEPHONE ENCOUNTER
Refill Request    Medication request: naproxen 500 MG Oral Tab    Take 1 tablet (500 mg total) by mouth 2 (two) times daily with meals.   LOV:11/16/2023 Behar, Alex, MD   Due back to clinic per last office note:  6-8 weeks if symptoms continue  NOV: Visit date not found      [place]: 08/26/23 #90    Urine drug screen (if applicable): n/a  Pain contract: n/a    LOV plan (if weaning or changing medications): No mention

## 2024-02-12 ENCOUNTER — OFFICE VISIT (OUTPATIENT)
Dept: INTERNAL MEDICINE CLINIC | Facility: CLINIC | Age: 59
End: 2024-02-12
Payer: COMMERCIAL

## 2024-02-12 VITALS
DIASTOLIC BLOOD PRESSURE: 78 MMHG | HEIGHT: 63.5 IN | BODY MASS INDEX: 30.27 KG/M2 | WEIGHT: 173 LBS | RESPIRATION RATE: 16 BRPM | HEART RATE: 88 BPM | SYSTOLIC BLOOD PRESSURE: 124 MMHG

## 2024-02-12 DIAGNOSIS — E11.9 TYPE 2 DIABETES MELLITUS WITHOUT RETINOPATHY (HCC): ICD-10-CM

## 2024-02-12 DIAGNOSIS — E66.9 CLASS 1 OBESITY WITH BODY MASS INDEX (BMI) OF 30.0 TO 30.9 IN ADULT, UNSPECIFIED OBESITY TYPE, UNSPECIFIED WHETHER SERIOUS COMORBIDITY PRESENT: ICD-10-CM

## 2024-02-12 DIAGNOSIS — Z51.81 ENCOUNTER FOR THERAPEUTIC DRUG LEVEL MONITORING: Primary | ICD-10-CM

## 2024-02-12 DIAGNOSIS — E78.00 HYPERCHOLESTEROLEMIA: ICD-10-CM

## 2024-02-12 DIAGNOSIS — G47.33 OSA ON CPAP: ICD-10-CM

## 2024-02-12 PROCEDURE — 3078F DIAST BP <80 MM HG: CPT | Performed by: PHYSICIAN ASSISTANT

## 2024-02-12 PROCEDURE — 3008F BODY MASS INDEX DOCD: CPT | Performed by: PHYSICIAN ASSISTANT

## 2024-02-12 PROCEDURE — 3074F SYST BP LT 130 MM HG: CPT | Performed by: PHYSICIAN ASSISTANT

## 2024-02-12 PROCEDURE — 99214 OFFICE O/P EST MOD 30 MIN: CPT | Performed by: PHYSICIAN ASSISTANT

## 2024-02-12 RX ORDER — SEMAGLUTIDE 0.68 MG/ML
INJECTION, SOLUTION SUBCUTANEOUS
Qty: 9 ML | Refills: 0 | Status: SHIPPED | OUTPATIENT
Start: 2024-02-12

## 2024-02-12 RX ORDER — PHENTERMINE HYDROCHLORIDE 37.5 MG/1
37.5 TABLET ORAL
Qty: 30 TABLET | Refills: 2 | Status: SHIPPED | OUTPATIENT
Start: 2024-02-12

## 2024-02-12 NOTE — PROGRESS NOTES
HISTORY OF PRESENT ILLNESS  Chief Complaint   Patient presents with    Weight Check     Lost 12 pounds       Deborah Sanders is a 58 year old female here for follow up in medical weight loss program.   -12lbs  Compliant on phentermine, mounjaro  Denies chest pain, shortness of breath, dizziness, blurred vision, headache, paresthesia, nausea/vomiting.   Noticed rash on Mounjaro,   Getting plenty of protein  Exercise/Activity: limited due to L hip pain  Nutrition: 24 hour food log reviewed, eating regular meals, +protein  Stress: manageable  Sleep: no problems      Wt Readings from Last 6 Encounters:   02/12/24 173 lb (78.5 kg)   12/10/23 175 lb (79.4 kg)   11/16/23 185 lb (83.9 kg)   10/30/23 185 lb (83.9 kg)   09/18/23 179 lb 3.2 oz (81.3 kg)   09/15/23 180 lb (81.6 kg)            Breakfast Lunch Dinner Snacks Fluids   Reviewed           REVIEW OF SYSTEMS  GENERAL HEALTH: feels well otherwise, denied any fevers chills or night sweats   RESPIRATORY: denies shortness of breath   CARDIOVASCULAR: denies chest pain  GI: denies abdominal pain    EXAM  /78   Pulse 88   Resp 16   Ht 5' 3.5\" (1.613 m)   Wt 173 lb (78.5 kg)   BMI 30.16 kg/m²   GENERAL: well developed, well nourished,in no apparent distress, A/O x3  SKIN: no rashes,no suspicious lesions  HEENT: atraumatic, normocephalic, OP-clear, PERRL  NECK: supple,no adenopathy  LUNGS: clear to auscultation bilaterally   CARDIO: RRR without murmur  GI: good BS's,NT/ND, no masses or HSM  EXTREMITIES: no cyanosis, no clubbing, no edema    Lab Results   Component Value Date    WBC 6.8 06/02/2023    RBC 5.72 (H) 02/27/2022    HGB 16.4 06/02/2023    HCT 49.6 06/02/2023    MCV 85.3 12/10/2023    MCH 27.1 02/27/2022    MCHC 32.2 12/10/2023    RDW 13.2 02/27/2022     06/02/2023    MPV 7.8 12/06/2018     Lab Results   Component Value Date    GLU 86 06/02/2023    BUN 15 06/02/2023    BUNCREA 23.9 (H) 02/27/2022    CREATSERUM 0.71 06/02/2023    ANIONGAP 8 02/27/2022     GFRNAA 100 06/02/2023    GFRAA 114 02/27/2022    CA 9.4 06/02/2023    OSMOCALC 292 02/27/2022    ALKPHO 130 (H) 02/04/2023    AST 16 06/02/2023    ALT 16 06/02/2023    ALKPHOS 96 07/11/2015    BILT 0.5 06/02/2023    TP 7.2 06/02/2023    ALB 3.7 02/04/2023    GLOBULIN 4.0 02/27/2022    AGRATIO 1.1 07/11/2015     02/27/2022    K 4.2 02/27/2022     02/27/2022    CO2 27.0 02/27/2022     Lab Results   Component Value Date     (H) 02/27/2022    A1C 6.1 06/02/2023     Lab Results   Component Value Date    CHOLEST 214 06/02/2023    TRIG 140 06/02/2023    HDL 66 06/02/2023     (H) 02/04/2023    VLDL 24 06/02/2023    NONHDLC 148 06/02/2023    CALCNONHDL 163 (H) 07/11/2015     Lab Results   Component Value Date    T4F 1.1 11/07/2020    TSH 1.040 06/02/2023     Lab Results   Component Value Date    B12 371 02/27/2022    VITB12 496 07/11/2015     Lab Results   Component Value Date    VITD 32.7 02/27/2022    CEKJ93CU 24.7 07/11/2015       Current Outpatient Medications on File Prior to Visit   Medication Sig Dispense Refill    NAPROXEN 500 MG Oral Tab TAKE 1 TABLET(500 MG) BY MOUTH TWICE DAILY WITH MEALS 60 tablet 0    MOUNJARO 5 MG/0.5ML Subcutaneous Solution Pen-injector ADMINISTER 5 MG UNDER THE SKIN 1 TIME A WEEK 2 mL 0    PHENTERMINE HCL 37.5 MG Oral Tab TAKE 1 TABLET(37.5 MG) BY MOUTH BEFORE BREAKFAST 30 tablet 0    cyclobenzaprine 10 MG Oral Tab Take 1 tablet (10 mg total) by mouth nightly as needed. 30 tablet 0    TRI-MALIKA 0.01-4-0.05 % External Cream Apply 1 Application topically 2 (two) times daily.      atorvastatin 10 MG Oral Tab Take 1 tablet (10 mg total) by mouth nightly.      pantoprazole 40 MG Oral Tab EC Take 1 tablet (40 mg total) by mouth before breakfast. 90 tablet 3    Empagliflozin (JARDIANCE) 25 MG Oral Tab Take 25 mg by mouth daily. 90 tablet 4    fluticasone propionate 50 MCG/ACT Nasal Suspension 2 sprays by Nasal route daily. 48 g 2    Cholecalciferol (VITAMIN D3) 1.25 MG  (58568 UT) Oral Cap Take 1 capsule by mouth once a week. 12 capsule 4    levocetirizine 5 MG Oral Tab Take 1 tablet (5 mg total) by mouth every evening. 90 tablet 1    Estradiol (ESTRACE) 0.1 MG/GM Vaginal Cream Apply 1/2 gram vaginally 2 times per week. 1 Tube 3    Tirzepatide (MOUNJARO) 2.5 MG/0.5ML Subcutaneous Solution Pen-injector Inject 2.5 mg into the skin once a week. (Patient not taking: Reported on 2/12/2024) 2 mL 0    semaglutide 2 MG/3ML Subcutaneous Solution Pen-injector Inject into the skin once a week. Sample  [id number]  1-31-25 (Patient not taking: Reported on 2/12/2024)      semaglutide (OZEMPIC, 0.25 OR 0.5 MG/DOSE,) 2 MG/3ML Subcutaneous Solution Pen-injector Inject 0.5 mg into the skin once a week. (Patient not taking: Reported on 2/12/2024) 9 mL 0    Phentermine HCl 15 MG Oral Cap Take 1 capsule (15 mg total) by mouth every morning. (Patient not taking: Reported on 2/12/2024) 30 capsule 2     No current facility-administered medications on file prior to visit.       ASSESSMENT  Analyzed weight data:       Diagnoses and all orders for this visit:    Encounter for therapeutic drug level monitoring  -     semaglutide (OZEMPIC, 0.25 OR 0.5 MG/DOSE,) 2 MG/3ML Subcutaneous Solution Pen-injector; Begin 0.25mg into skin weekly x 4 weeks, increase to 0.5mg into skin weekly  -     Phentermine HCl 37.5 MG Oral Tab; Take 1 tablet (37.5 mg total) by mouth every morning before breakfast.    Class 1 obesity with body mass index (BMI) of 30.0 to 30.9 in adult, unspecified obesity type, unspecified whether serious comorbidity present  -     semaglutide (OZEMPIC, 0.25 OR 0.5 MG/DOSE,) 2 MG/3ML Subcutaneous Solution Pen-injector; Begin 0.25mg into skin weekly x 4 weeks, increase to 0.5mg into skin weekly  -     Phentermine HCl 37.5 MG Oral Tab; Take 1 tablet (37.5 mg total) by mouth every morning before breakfast.    Type 2 diabetes mellitus without retinopathy (HCC)  -     semaglutide (OZEMPIC, 0.25 OR 0.5  MG/DOSE,) 2 MG/3ML Subcutaneous Solution Pen-injector; Begin 0.25mg into skin weekly x 4 weeks, increase to 0.5mg into skin weekly  -     Phentermine HCl 37.5 MG Oral Tab; Take 1 tablet (37.5 mg total) by mouth every morning before breakfast.    Hypercholesterolemia  -     semaglutide (OZEMPIC, 0.25 OR 0.5 MG/DOSE,) 2 MG/3ML Subcutaneous Solution Pen-injector; Begin 0.25mg into skin weekly x 4 weeks, increase to 0.5mg into skin weekly  -     Phentermine HCl 37.5 MG Oral Tab; Take 1 tablet (37.5 mg total) by mouth every morning before breakfast.    BASILIA on CPAP  -     semaglutide (OZEMPIC, 0.25 OR 0.5 MG/DOSE,) 2 MG/3ML Subcutaneous Solution Pen-injector; Begin 0.25mg into skin weekly x 4 weeks, increase to 0.5mg into skin weekly  -     Phentermine HCl 37.5 MG Oral Tab; Take 1 tablet (37.5 mg total) by mouth every morning before breakfast.        PLAN  Initial Weight: 204lbs  Initial Weight Date: 12/2/22  Today's Weight: 173lbs  5% Goal: 10.2lbs  10% Goal: 20.4lbs  Total Weight Loss: Down 12lbs/Net loss 31lbs    Will decrease phentermine - advised side effects and adverse effects of medication   Mounjaro causing a rash, discontinue  Will begin Ozempic 0.25mg weekly - denies any personal or family history of pancreatitis, pancreatic cancer, thyroid cancer, MEN2 - discussed MOA, advised side effects and adverse effects of medication.  DM - continue current medications, low carb diet  HLD - stable on current medication atorvastatin, will continue, will continue to work on lifestyle modifications  CPAP compliance  Consistency with logging foods - protein and produce  Nutrition: low carb diet/ recommended to eat breakfast daily/ regular protein intake  Medication use and side effects reviewed with patient.  Medication contraindications: contrave, vyvanse   Follow up with dietitian and psychologist as recommended.  Discussed the role of sleep and stress in weight management.  Counseled on comprehensive weight loss plan  including attention to nutrition, exercise and behavior/stress management for success. See patient instruction below for more details.  Discussed strategies to overcome barriers to successful weight loss and weight maintenance  FITTE: ACSM recommendations (150-300 minutes/ week in active weight loss)   Weight Loss consent to treat reviewed and signed       NOTE TO PATIENT: The 21st Century Cures Act makes clinical notes like these available to patients in the interest of transparency. Clinical notes are medical documents used by physicians and care providers to communicate with each other. These documents include medical language and terminology, abbreviations, and treatment information that may sound technical and at times possibly unfamiliar. In addition, at times, the verbiage may appear blunt or direct. These documents are one tool providers use to communicate relevant information and clinical opinions of the care providers in a way that allows common understanding of the clinical context.     There are no Patient Instructions on file for this visit.    No follow-ups on file.    Patient verbalizes understanding.    Joan Leigh PA-C  2/12/2024

## 2024-03-05 DIAGNOSIS — M47.816 FACET SYNDROME, LUMBAR: ICD-10-CM

## 2024-03-05 DIAGNOSIS — M48.061 LUMBAR FORAMINAL STENOSIS: ICD-10-CM

## 2024-03-05 DIAGNOSIS — M22.2X9 PATELLOFEMORAL PAIN SYNDROME, UNSPECIFIED LATERALITY: ICD-10-CM

## 2024-03-05 DIAGNOSIS — M17.10 PRIMARY OSTEOARTHRITIS OF KNEE, UNSPECIFIED LATERALITY: ICD-10-CM

## 2024-03-05 DIAGNOSIS — E78.5 HYPERLIPIDEMIA, UNSPECIFIED HYPERLIPIDEMIA TYPE: ICD-10-CM

## 2024-03-05 DIAGNOSIS — M51.36 BULGE OF LUMBAR DISC WITHOUT MYELOPATHY: ICD-10-CM

## 2024-03-05 DIAGNOSIS — M51.16 LUMBAR DISC HERNIATION WITH RADICULOPATHY: ICD-10-CM

## 2024-03-05 DIAGNOSIS — M54.59 MECHANICAL LOW BACK PAIN: ICD-10-CM

## 2024-03-05 DIAGNOSIS — M47.816 LUMBAR SPONDYLOSIS: ICD-10-CM

## 2024-03-05 DIAGNOSIS — M54.59 LUMBAR TRIGGER POINT SYNDROME: ICD-10-CM

## 2024-03-05 DIAGNOSIS — M51.37 DDD (DEGENERATIVE DISC DISEASE), LUMBOSACRAL: ICD-10-CM

## 2024-03-06 ENCOUNTER — TELEPHONE (OUTPATIENT)
Dept: PHYSICAL MEDICINE AND REHAB | Facility: CLINIC | Age: 59
End: 2024-03-06

## 2024-03-06 RX ORDER — NAPROXEN 500 MG/1
500 TABLET ORAL 2 TIMES DAILY WITH MEALS
Qty: 60 TABLET | Refills: 0 | Status: SHIPPED | OUTPATIENT
Start: 2024-03-06

## 2024-03-06 NOTE — TELEPHONE ENCOUNTER
Refill Request    Medication request: NAPROXEN 500 MG Oral Tab  TAKE 1 TABLET(500 MG) BY MOUTH TWICE DAILY WITH MEALS     LOV:11/16/2023 Behar, Alex, MD   Due back to clinic per last office note:  \"RTC in 6 to 8 weeks\"  NOV: 4/24/2024 Behar, Alex, MD      [place]: 2/05/2024 #60    Urine drug screen (if applicable): n/a  Pain contract: n/a    LOV plan (if weaning or changing medications): \"Start cyclobenzaprine 5 mg 0.5-2 tablets three times per day as needed for spasms. \"  No mention of Naproxen in LOV plan.        1st refill of Naproxen.  Ok to fill per protocol.

## 2024-03-06 NOTE — TELEPHONE ENCOUNTER
S/w patient- c/o L lateral hip, thigh and L knee stabbing, pulling pain. Also c/o swelling in L lateral hip. Admits some T/N. Admits weakness. Rates pain 7/10. Takes naproxen or tylenol PRN. States pain has been worsening over last month. And she is having trouble bending over to put on shoes or socks.     She had L GTB injection on 11/16 with 80% relief for 2 weeks. Symptoms slowly returned.     She is scheduled for f/u on 4/24.

## 2024-03-07 ENCOUNTER — HOSPITAL ENCOUNTER (EMERGENCY)
Facility: HOSPITAL | Age: 59
Discharge: HOME | End: 2024-03-07
Attending: EMERGENCY MEDICINE
Payer: COMMERCIAL

## 2024-03-07 VITALS
HEART RATE: 88 BPM | BODY MASS INDEX: 30 KG/M2 | DIASTOLIC BLOOD PRESSURE: 83 MMHG | TEMPERATURE: 98 F | RESPIRATION RATE: 16 BRPM | SYSTOLIC BLOOD PRESSURE: 135 MMHG | OXYGEN SATURATION: 96 % | WEIGHT: 174.19 LBS

## 2024-03-07 DIAGNOSIS — M54.40 BACK PAIN OF LUMBAR REGION WITH SCIATICA: Primary | ICD-10-CM

## 2024-03-07 PROCEDURE — 99283 EMERGENCY DEPT VISIT LOW MDM: CPT

## 2024-03-07 PROCEDURE — 96372 THER/PROPH/DIAG INJ SC/IM: CPT

## 2024-03-07 PROCEDURE — 99284 EMERGENCY DEPT VISIT MOD MDM: CPT

## 2024-03-07 RX ORDER — KETOROLAC TROMETHAMINE 30 MG/ML
60 INJECTION, SOLUTION INTRAMUSCULAR; INTRAVENOUS ONCE
Status: COMPLETED | OUTPATIENT
Start: 2024-03-07 | End: 2024-03-07

## 2024-03-07 RX ORDER — CYCLOBENZAPRINE HCL 10 MG
10 TABLET ORAL 3 TIMES DAILY PRN
Qty: 20 TABLET | Refills: 0 | Status: SHIPPED | OUTPATIENT
Start: 2024-03-07

## 2024-03-07 RX ORDER — NAPROXEN 500 MG/1
500 TABLET ORAL 2 TIMES DAILY PRN
Qty: 20 TABLET | Refills: 0 | Status: SHIPPED | OUTPATIENT
Start: 2024-03-07 | End: 2024-03-14

## 2024-03-07 RX ORDER — METHYLPREDNISOLONE 4 MG/1
TABLET ORAL
Qty: 1 EACH | Refills: 0 | Status: SHIPPED | OUTPATIENT
Start: 2024-03-07

## 2024-03-07 NOTE — ED PROVIDER NOTES
Patient Seen in: [facility] Emergency Department      History     Chief Complaint   Patient presents with    Pain     Stated Complaint: left sided pain, prominently in left leg and hip area. Pain present for over on*    Subjective:   HPI    58-year-old female comes to the hospital complaint of having difficulty with pain by the area of the left lower back into her left buttocks and down to her left knee region.  She has a history of having this in the past.  She has had a workup in the past including an MRI of her spine done about 6 months ago that showed bulging disks in her lower back with arthritic changes noted as well.  She states that this is similar to that event but this is little more painful.  Pain is worse with movement or if it is pushed on.  She denies any numbness or weakness.  She has no incontinence of urine or stool.  She denies any fevers or chills.  No other significant abdominal pain nausea or vomiting.  She is no other complaints at this time.  She denies any heavy pushing or moving as of late.    Objective:   Past Medical History:   Diagnosis Date    Anesthesia complication     Biliary calculus     Difficult intubation     Difficulty opening mouth wide during general anesthesia    Esophageal reflux     Hyperlipidemia     Lipid screening 05/17/2014    per NG    Other ill-defined conditions(799.89)     per [facility]:  \"Abnormal pap smear; Management:  colposcopy x3\"    Type 2 diabetes mellitus without retinopathy (HCC) 01/25/2021    Vitamin D deficiency               No pertinent past surgical history.              No pertinent social history.            Review of Systems    Positive for stated complaint: left sided pain, prominently in left leg and hip area. Pain present for over on*  Other systems are as noted in HPI.  Constitutional and vital signs reviewed.      All other systems reviewed and negative except as noted above.    Physical Exam     ED Triage Vitals   BP 03/07/24 0324 135/83    Pulse 03/07/24 0324 86   Resp 03/07/24 0324 16   Temp 03/07/24 0327 98.3 °F (36.8 °C)   Temp src --    SpO2 03/07/24 0324 98 %   O2 Device 03/07/24 0324 None (Room air)       Current:/83   Pulse 86   Temp 98.3 °F (36.8 °C)   Resp 16   Wt 79 kg   SpO2 98%   BMI 30.37 kg/m²         Physical Exam    HEENT : NCAT, EOMI, PEERL,  neck supple, no JVD, trachea midline, No LAD  Heart: S1S2 normal. No murmurs, regular rate and rhythm  Lungs: Clear to auscultation bilaterally  Abdomen: Soft nontender nondistended normal active bowel sounds without rebound, guarding or masses noted  Back reproducible tenderness noted at the area of her left lower back with palpation and movement  Extremity no clubbing, cyanosis or edema noted.  Full range of motion noted without tenderness  Neuro: No focal deficits noted    All measures to prevent infection transmission during my interaction with the patient were taken.  The patient was already wearing droplet mask on my arrival to the room.  Personal protective equipment including a droplet mask as well as gloves were worn throughout the duration of my exam.  Hand washing was performed prior to and after the exam.  Stethoscope and equipment used during my examination was cleaned with a super Sani cloth germicidal wipe following the exam.    ED Course   Labs Reviewed - No data to display       ED Course as of 03/07/24 0332  ------------------------------------------------------------  Time: 03/07 0330  Comment: While here the patient was given IM Toradol for discomfort.  I did review the patient's prior MRI from 6 months ago.     Medications   ketorolac (Toradol) 60 MG/2ML IM injection 60 mg (has no administration in time range)              MDM      Differential diagnosis includes back spasm, sciatica, diverticulitis, urinary tract infection but not limited such.  Patient's history and physical are consistent with some back pain with associated sciatica.  Did review the patient's  prior MRI and at this time the patient will be discharged home after Toradol given and follow-up.    Patient was screened and evaluated during this visit.   As a treating physician attending to the patient, I determined, within reasonable clinical confidence and prior to discharge, that an emergency medical condition was not or was no longer present.  There was no indication for further evaluation, treatment or admission on an emergency basis.       The usual and customary discharge instuctions were discussed given the patient's ER course.  We discussed signs and symptoms that should prompt the patient's immediate return to the emergency department.   Reasonable over the counter and prescription treatment options and Physician follow up plan was discussed.       The patient is discharged in good condition.       This note was prepared using Dragon Medical voice recognition dictation software.  As a result errors may occur.  When identified to these areas have been corrected.  While every attempt is made to correct errors during dictation discrepancies may still exist.  Please contact if there are any errors.                                   Medical Decision Making      Disposition and Plan     Clinical Impression:  1. Back pain of lumbar region with sciatica         Disposition:  Discharge  3/7/2024  3:32 am    Follow-up:  Brittanie Preciado MD  [address] [place] [id number]  [phone]    Schedule an appointment as soon as possible for a visit in 2 day(s)            Medications Prescribed:  Current Discharge Medication List        START taking these medications    Details   !! naproxen 500 MG Oral Tab Take 1 tablet (500 mg total) by mouth 2 (two) times daily as needed.  Qty: 20 tablet, Refills: 0      !! cyclobenzaprine 10 MG Oral Tab Take 1 tablet (10 mg total) by mouth 3 (three) times daily as needed for Muscle spasms.  Qty: 20 tablet, Refills: 0      methylPREDNISolone (MEDROL) 4 MG Oral  Tablet Therapy Pack Dosepack: take as directed  Qty: 1 each, Refills: 0       !! - Potential duplicate medications found. Please discuss with provider.

## 2024-03-13 ENCOUNTER — OFFICE VISIT (OUTPATIENT)
Dept: PHYSICAL MEDICINE AND REHAB | Facility: CLINIC | Age: 59
End: 2024-03-13
Payer: COMMERCIAL

## 2024-03-13 DIAGNOSIS — M47.816 FACET SYNDROME, LUMBAR: ICD-10-CM

## 2024-03-13 DIAGNOSIS — M47.816 LUMBAR SPONDYLOSIS: ICD-10-CM

## 2024-03-13 DIAGNOSIS — M79.10 MYALGIA: ICD-10-CM

## 2024-03-13 DIAGNOSIS — M54.59 MECHANICAL LOW BACK PAIN: ICD-10-CM

## 2024-03-13 DIAGNOSIS — M51.36 BULGE OF LUMBAR DISC WITHOUT MYELOPATHY: ICD-10-CM

## 2024-03-13 DIAGNOSIS — M54.16 LUMBAR RADICULOPATHY: Primary | ICD-10-CM

## 2024-03-13 DIAGNOSIS — M51.37 DDD (DEGENERATIVE DISC DISEASE), LUMBOSACRAL: ICD-10-CM

## 2024-03-13 DIAGNOSIS — M48.061 LUMBAR FORAMINAL STENOSIS: ICD-10-CM

## 2024-03-13 DIAGNOSIS — M99.9 BIOMECHANICAL LESION: ICD-10-CM

## 2024-03-13 DIAGNOSIS — E66.09 CLASS 1 OBESITY DUE TO EXCESS CALORIES WITH SERIOUS COMORBIDITY AND BODY MASS INDEX (BMI) OF 30.0 TO 30.9 IN ADULT: ICD-10-CM

## 2024-03-13 DIAGNOSIS — G25.89 SCAPULAR DYSKINESIS: ICD-10-CM

## 2024-03-13 PROCEDURE — 99214 OFFICE O/P EST MOD 30 MIN: CPT | Performed by: PHYSICAL MEDICINE & REHABILITATION

## 2024-03-13 NOTE — PATIENT INSTRUCTIONS
1) Please begin physical therapy as soon as possible.   2) Tylenol 500-1000 mg every 6-8 hours as needed for pain.  No more than 3000 mg daily.  3) Take Naprosyn f you have another flare up until we schedule injection  4) If symptoms return, then would recommend LEFT L5 and LEFt S1 TFESI

## 2024-04-08 ENCOUNTER — TELEPHONE (OUTPATIENT)
Dept: PHYSICAL MEDICINE AND REHAB | Facility: CLINIC | Age: 59
End: 2024-04-08

## 2024-04-08 DIAGNOSIS — M54.16 LUMBAR RADICULOPATHY: Primary | ICD-10-CM

## 2024-04-08 DIAGNOSIS — M51.37 DDD (DEGENERATIVE DISC DISEASE), LUMBOSACRAL: ICD-10-CM

## 2024-04-08 NOTE — TELEPHONE ENCOUNTER
Initiated authorization for LEFT L5 and LEFT S1 TFESI  CPT 60139, 82025 dx:M54.16 to be done at [facility] with [employer]  Case #[id number]  Status: Approved-authorization is not required per health plan however may be subject to review once claim is submitted valid 4/8/24-7/8/24

## 2024-04-08 NOTE — TELEPHONE ENCOUNTER
Patient has been scheduled for Left L5 and S1 transforaminal epidural steroid injection on 4/10/24 at the [facility] with Dr. Behar.   -Anesthesia type:  Local  -Patient was advised that if he/she does receive the covid vaccine it needs to be at least 2 weeks before or after the injection.  -Medications and allergies reviewed.  -Patient reminded to hold NSAIDs (Ibuprofen, ASA 81, Aleve, Naproxen, Mobic, Diclofenac, Etodolac, Celebrex etc.) for 3 days prior to Lumbar MBB/Facet if BMI is greater than 35. For Cervical injections only hold multivitamins, Vitamin E, Fish Oil, Phentermine/Lomaira for 7 days prior to injection and NSAIDS.   mg to be held for 7 days prior to injections.  -If patient is receiving MAC/IVCS, weight loss oral/injectable medications will need to be held for 7 days prior to injection.  -Patient informed to fast 12 hours prior to procedure with IVCS/MAC.   -If on blood thinner, clearance has been received and approved to hold this medication by provider.   -Patient informed of [facility]'s  policy:  he/she will need a  to and from procedure and must be on site for their entirety of their visit, if their ride is unable to the procedure will be cancelled.   -[facility] is located in the [facility] 1st floor,  may park in the yellow/purple parking lot.  Patient verbalized understanding and agrees with plan.  Scheduled in Epic: Yes  Scheduled in Surgical Case: Yes  Follow up appointment made: NOV: 4/24/2024 Behar, Alex, MD

## 2024-04-08 NOTE — TELEPHONE ENCOUNTER
S/w patient who states she is ready to have LEFT L5 and LEFt S1 TFESI done per Dr. Behar's LOV notes.

## 2024-04-12 ENCOUNTER — TELEPHONE (OUTPATIENT)
Dept: INTERNAL MEDICINE CLINIC | Facility: CLINIC | Age: 59
End: 2024-04-12

## 2024-04-12 ENCOUNTER — TELEMEDICINE (OUTPATIENT)
Dept: INTERNAL MEDICINE CLINIC | Facility: CLINIC | Age: 59
End: 2024-04-12
Payer: COMMERCIAL

## 2024-04-12 DIAGNOSIS — Z51.81 ENCOUNTER FOR THERAPEUTIC DRUG LEVEL MONITORING: Primary | ICD-10-CM

## 2024-04-12 DIAGNOSIS — G47.33 OSA ON CPAP: ICD-10-CM

## 2024-04-12 DIAGNOSIS — E11.9 TYPE 2 DIABETES MELLITUS WITHOUT RETINOPATHY (HCC): ICD-10-CM

## 2024-04-12 DIAGNOSIS — E78.00 HYPERCHOLESTEROLEMIA: ICD-10-CM

## 2024-04-12 DIAGNOSIS — E66.3 OVERWEIGHT (BMI 25.0-29.9): ICD-10-CM

## 2024-04-12 PROCEDURE — 99213 OFFICE O/P EST LOW 20 MIN: CPT | Performed by: PHYSICIAN ASSISTANT

## 2024-04-12 RX ORDER — PHENTERMINE HYDROCHLORIDE 15 MG/1
15 CAPSULE ORAL EVERY MORNING
Qty: 30 CAPSULE | Refills: 2 | Status: SHIPPED | OUTPATIENT
Start: 2024-04-12

## 2024-04-12 NOTE — PROGRESS NOTES
This visit is conducted using Telemedicine with live, interactive video and audio.    Patient has been referred to the [facility] website at [email or web address] to review the yearly Consent to Treat document.    Patient understands and accepts financial responsibility for any deductible, co-insurance and/or co-pays associated with this service.      HISTORY OF PRESENT ILLNESS  Chief Complaint   Patient presents with    Weight Check       Deborah Sanders is a 58 year old female here for follow up in medical weight loss program.   162lbs  Pt is compliant on ozempic and phentermine  Denies chest pain, shortness of breath, dizziness, blurred vision, headache, paresthesia, nausea/vomiting.   Last OV, phentermine lowered  Feels like doing better with protein    Exercise/Activity: trying to increase, doing cardio and strength training  Nutrition: 24 hour food log reviewed, eating regular meals, +protein  Stress: stress higher, father is in hospice at her home  Sleep: 5 hours/night         Wt Readings from Last 6 Encounters:   04/08/24 165 lb (74.8 kg)   03/07/24 174 lb 2.6 oz (79 kg)   02/12/24 173 lb (78.5 kg)   12/10/23 175 lb (79.4 kg)   11/16/23 185 lb (83.9 kg)   10/30/23 185 lb (83.9 kg)            Breakfast Lunch Dinner Snacks Fluids              REVIEW OF SYSTEMS  GENERAL HEALTH: feels well otherwise, denied any fevers chills or night sweats   RESPIRATORY: denies shortness of breath   CARDIOVASCULAR: denies chest pain  GI: denies abdominal pain    EXAM  There were no vitals taken for this visit.  GENERAL: well developed, well nourished,in no apparent distress, A/O x3  SKIN: no rashes,no suspicious lesions on visible skin  HEENT: atraumatic, normocephalic  LUNGS: no increased effort or work with breathing   NEURO: speaking fluently and in clear sentences    Lab Results   Component Value Date    WBC 6.8 06/02/2023    RBC 5.72 (H) 02/27/2022    HGB 16.4 06/02/2023    HCT 49.6 06/02/2023    MCV 85.3 12/10/2023    MCH  27.1 02/27/2022    MCHC 32.2 12/10/2023    RDW 13.2 02/27/2022     06/02/2023    MPV 7.8 12/06/2018     Lab Results   Component Value Date    GLU 86 06/02/2023    BUN 15 06/02/2023    BUNCREA 23.9 (H) 02/27/2022    CREATSERUM 0.71 06/02/2023    ANIONGAP 8 02/27/2022    GFRNAA 100 06/02/2023    GFRAA 114 02/27/2022    CA 9.4 06/02/2023    OSMOCALC 292 02/27/2022    ALKPHO 130 (H) 02/04/2023    AST 16 06/02/2023    ALT 16 06/02/2023    ALKPHOS 96 07/11/2015    BILT 0.5 06/02/2023    TP 7.2 06/02/2023    ALB 3.7 02/04/2023    GLOBULIN 4.0 02/27/2022    AGRATIO 1.1 07/11/2015     02/27/2022    K 4.2 02/27/2022     02/27/2022    CO2 27.0 02/27/2022     Lab Results   Component Value Date     (H) 02/27/2022    A1C 6.1 06/02/2023     Lab Results   Component Value Date    CHOLEST 214 06/02/2023    TRIG 140 06/02/2023    HDL 66 06/02/2023     (H) 02/04/2023    VLDL 24 06/02/2023    NONHDLC 148 06/02/2023    CALCNONHDL 163 (H) 07/11/2015     Lab Results   Component Value Date    T4F 1.1 11/07/2020    TSH 1.040 06/02/2023     Lab Results   Component Value Date    B12 371 02/27/2022    VITB12 496 07/11/2015     Lab Results   Component Value Date    VITD 32.7 02/27/2022    PHCG14PB 24.7 07/11/2015       Current Outpatient Medications on File Prior to Visit   Medication Sig Dispense Refill    semaglutide (OZEMPIC, 0.25 OR 0.5 MG/DOSE,) 2 MG/3ML Subcutaneous Solution Pen-injector Begin 0.25mg into skin weekly x 4 weeks, increase to 0.5mg into skin weekly (Patient not taking: Reported on 4/8/2024) 9 mL 0    MOUNJARO 5 MG/0.5ML Subcutaneous Solution Pen-injector ADMINISTER 5 MG UNDER THE SKIN 1 TIME A WEEK (Patient not taking: Reported on 4/10/2024) 2 mL 0    PHENTERMINE HCL 37.5 MG Oral Tab TAKE 1 TABLET(37.5 MG) BY MOUTH BEFORE BREAKFAST 30 tablet 0    cyclobenzaprine 10 MG Oral Tab Take 1 tablet (10 mg total) by mouth nightly as needed. 30 tablet 0    TRI-MALIKA 0.01-4-0.05 % External Cream Apply 1  Application topically 2 (two) times daily. (Patient not taking: Reported on 4/8/2024)      atorvastatin 10 MG Oral Tab Take 1 tablet (10 mg total) by mouth nightly. (Patient not taking: Reported on 4/10/2024)      pantoprazole 40 MG Oral Tab EC Take 1 tablet (40 mg total) by mouth before breakfast. 90 tablet 3    Empagliflozin (JARDIANCE) 25 MG Oral Tab Take 25 mg by mouth daily. 90 tablet 4    fluticasone propionate 50 MCG/ACT Nasal Suspension 2 sprays by Nasal route daily. 48 g 2    Cholecalciferol (VITAMIN D3) 1.25 MG (94820 UT) Oral Cap Take 1 capsule by mouth once a week. 12 capsule 4    levocetirizine 5 MG Oral Tab Take 1 tablet (5 mg total) by mouth every evening. 90 tablet 1    Estradiol (ESTRACE) 0.1 MG/GM Vaginal Cream Apply 1/2 gram vaginally 2 times per week. 1 Tube 3     No current facility-administered medications on file prior to visit.       ASSESSMENT  Analyzed weight data:       Diagnoses and all orders for this visit:    Encounter for therapeutic drug level monitoring  -     Phentermine HCl 15 MG Oral Cap; Take 1 capsule (15 mg total) by mouth every morning.  -     semaglutide 4 MG/3ML Subcutaneous Solution Pen-injector; Inject 1 mg into the skin once a week.    Overweight (BMI 25.0-29.9)  -     Phentermine HCl 15 MG Oral Cap; Take 1 capsule (15 mg total) by mouth every morning.  -     semaglutide 4 MG/3ML Subcutaneous Solution Pen-injector; Inject 1 mg into the skin once a week.    Type 2 diabetes mellitus without retinopathy (HCC)  -     Phentermine HCl 15 MG Oral Cap; Take 1 capsule (15 mg total) by mouth every morning.  -     semaglutide 4 MG/3ML Subcutaneous Solution Pen-injector; Inject 1 mg into the skin once a week.    Hypercholesterolemia  -     Phentermine HCl 15 MG Oral Cap; Take 1 capsule (15 mg total) by mouth every morning.  -     semaglutide 4 MG/3ML Subcutaneous Solution Pen-injector; Inject 1 mg into the skin once a week.    BASILIA on CPAP  -     Phentermine HCl 15 MG Oral Cap; Take  1 capsule (15 mg total) by mouth every morning.  -     semaglutide 4 MG/3ML Subcutaneous Solution Pen-injector; Inject 1 mg into the skin once a week.        PLAN  Initial Weight: 204lbs  Initial Weight Date: 12/2/22  Today's Weight: 162lbs  5% Goal: 10.2lbs  10% Goal: 20.4lbs  Total Weight Loss: Net loss 42lbs    Will continue phentermine and increase ozempic - advised side effects and adverse effects of medication   DM - continue current medications, low carb diet  HLD - stable on current medication atorvastatin, will continue, will continue to work on lifestyle modifications  CPAP compliance  Consistency with logging foods - protein and produce  Nutrition: low carb diet/ recommended to eat breakfast daily/ regular protein intake  Medication use and side effects reviewed with patient.  Medication contraindications: contrave, vyvanse, mounjaro  Follow up with dietitian and psychologist as recommended.  Discussed the role of sleep and stress in weight management.  Counseled on comprehensive weight loss plan including attention to nutrition, exercise and behavior/stress management for success. See patient instruction below for more details.  Discussed strategies to overcome barriers to successful weight loss and weight maintenance  FITTE: ACSM recommendations (150-300 minutes/ week in active weight loss)   Weight Loss consent to treat reviewed and signed     There are no Patient Instructions on file for this visit.    No follow-ups on file.    Patient verbalizes understanding.    Joan Leigh PA-C  4/12/2024    Please note that the following visit was completed using two-way, real-time interactive audio and video communication.  This has been done in good linda to provide continuity of care in the best interest of the provider-patient relationship, due to the ongoing public health crisis/national emergency and because of restrictions of visitation.  There are limitations of this visit as no physical exam could  be performed.  Every conscious effort was taken to allow for sufficient and adequate time.  This billing was spent on reviewing labs, medications, radiology tests and decision making.  Appropriate medical decision-making and tests are ordered as detailed in the plan of care above    Joan Leigh PA-C

## 2024-04-12 NOTE — TELEPHONE ENCOUNTER
Prior auth approved  Prior authorization approved  Payer: [employer] [facility] Case ID: [id number]    [phone]  Note from payer: Prior authorization in place - if requesting an increased quantity, a quantity limit review can be submitted via fax - Prescriber details have been updated to match the prescriber directory.  Approval Details    Authorized from August 18, 2023 to August 18, 2024

## 2024-04-17 ENCOUNTER — APPOINTMENT (OUTPATIENT)
Dept: PHYSICAL THERAPY | Facility: HOSPITAL | Age: 59
End: 2024-04-17
Attending: PHYSICAL MEDICINE & REHABILITATION
Payer: COMMERCIAL

## 2024-04-24 ENCOUNTER — APPOINTMENT (OUTPATIENT)
Dept: PHYSICAL THERAPY | Facility: HOSPITAL | Age: 59
End: 2024-04-24
Attending: PHYSICAL MEDICINE & REHABILITATION
Payer: COMMERCIAL

## 2024-04-29 ENCOUNTER — APPOINTMENT (OUTPATIENT)
Dept: PHYSICAL THERAPY | Facility: HOSPITAL | Age: 59
End: 2024-04-29
Attending: PHYSICAL MEDICINE & REHABILITATION
Payer: COMMERCIAL

## 2024-04-29 ENCOUNTER — OFFICE VISIT (OUTPATIENT)
Dept: PHYSICAL MEDICINE AND REHAB | Facility: CLINIC | Age: 59
End: 2024-04-29
Payer: COMMERCIAL

## 2024-04-29 VITALS — BODY MASS INDEX: 26.52 KG/M2 | HEART RATE: 100 BPM | WEIGHT: 165 LBS | OXYGEN SATURATION: 98 % | HEIGHT: 66 IN

## 2024-04-29 DIAGNOSIS — M47.816 FACET SYNDROME, LUMBAR: ICD-10-CM

## 2024-04-29 DIAGNOSIS — M51.37 DDD (DEGENERATIVE DISC DISEASE), LUMBOSACRAL: Primary | ICD-10-CM

## 2024-04-29 DIAGNOSIS — M54.16 LUMBAR RADICULOPATHY: ICD-10-CM

## 2024-04-29 DIAGNOSIS — G25.89 SCAPULAR DYSKINESIS: ICD-10-CM

## 2024-04-29 DIAGNOSIS — M47.816 LUMBAR SPONDYLOSIS: ICD-10-CM

## 2024-04-29 DIAGNOSIS — E66.09 CLASS 1 OBESITY DUE TO EXCESS CALORIES WITH SERIOUS COMORBIDITY AND BODY MASS INDEX (BMI) OF 30.0 TO 30.9 IN ADULT: ICD-10-CM

## 2024-04-29 DIAGNOSIS — M99.9 BIOMECHANICAL LESION: ICD-10-CM

## 2024-04-29 DIAGNOSIS — M48.061 LUMBAR FORAMINAL STENOSIS: ICD-10-CM

## 2024-04-29 DIAGNOSIS — M54.59 MECHANICAL LOW BACK PAIN: ICD-10-CM

## 2024-04-29 DIAGNOSIS — M51.36 BULGE OF LUMBAR DISC WITHOUT MYELOPATHY: ICD-10-CM

## 2024-04-29 DIAGNOSIS — M79.10 MYALGIA: ICD-10-CM

## 2024-04-29 PROCEDURE — 99214 OFFICE O/P EST MOD 30 MIN: CPT | Performed by: PHYSICAL MEDICINE & REHABILITATION

## 2024-04-29 PROCEDURE — 3008F BODY MASS INDEX DOCD: CPT | Performed by: PHYSICAL MEDICINE & REHABILITATION

## 2024-04-29 NOTE — PROGRESS NOTES
[facility]  Progress Note    CHIEF COMPLAINT:    Chief Complaint   Patient presents with    Follow - Up     LOV 3/13/24. Left L5 and S1 transforaminal epidural steroid injection 4/10/24. Patient states injection helped for 3 days only with 100% relief, but pain has returned. Pain 7/10. Advil or Tylenol PRN for pain. Denies N/T. Denies weakness. Patient is unable to do PT at the moment due to family issues.        History of Present Illness:  The patient is a 58 year old right-handed female with significant past medical history of hyperlipidemia who presents for follow-up of her low back pain with radiation down left leg.  She is status post left L5 and left S1 transforaminal epidural steroid injection where she had 100% relief overall for 3 days but her left-sided low back pain has since returned and rates it an 8 out of 10.  She loon no longer has radicular symptoms down the left leg.  She denies any weakness or numbness and tingling.  She has been unable to start physical therapy as she has been taking care of her ailing father.  She has been taking ibuprofen and Tylenol as needed for pain.    PAST MEDICAL HISTORY:  Past Medical History:    Anesthesia complication    Biliary calculus    Difficult intubation    Difficulty opening mouth wide during general anesthesia    Esophageal reflux    Hyperlipidemia    Lipid screening    per NG    Other ill-defined conditions(799.89)    per [employer]:  \"Abnormal pap smear; Management:  colposcopy x3\"    Sleep apnea    doest use mask    Type 2 diabetes mellitus without retinopathy (HCC)    Vitamin D deficiency       SURGICAL HISTORY:  Past Surgical History:   Procedure Laterality Date    Cholecystectomy  2014    Colonoscopy  2014    Colonoscopy N/A 06/12/2018    few diverticula    Egd  06/2018    small HH, H Pylori gastritis    Egd  2014    Ercp duct stent placement  03/2014    bile leak    Hysterectomy  1992    partial hystero.     Other surgical history  2008    Bladder surgery       SOCIAL HISTORY:   Social History     Occupational History    Not on file   Tobacco Use    Smoking status: Former     Current packs/day: 0.00     Types: Cigarettes     Start date: 1999     Quit date: 2012     Years since quittin.7    Smokeless tobacco: Never   Vaping Use    Vaping status: Never Used   Substance and Sexual Activity    Alcohol use: Not Currently     Comment: socially    Drug use: No    Sexual activity: Yes       FAMILY HISTORY:   Family History   Problem Relation Age of Onset    Cancer Mother         Uterus Cancer; age 40 cause of death    Diabetes Father     Cancer Father         Lung CA    Breast Cancer Sister 49    Glaucoma Neg     Macular degeneration Neg        CURRENT MEDICATIONS:   Current Outpatient Medications   Medication Sig Dispense Refill    Phentermine HCl 15 MG Oral Cap Take 1 capsule (15 mg total) by mouth every morning. 30 capsule 2    semaglutide 4 MG/3ML Subcutaneous Solution Pen-injector Inject 1 mg into the skin once a week. 9 mL 0    semaglutide (OZEMPIC, 0.25 OR 0.5 MG/DOSE,) 2 MG/3ML Subcutaneous Solution Pen-injector Begin 0.25mg into skin weekly x 4 weeks, increase to 0.5mg into skin weekly (Patient not taking: Reported on 2024) 9 mL 0    MOUNJARO 5 MG/0.5ML Subcutaneous Solution Pen-injector ADMINISTER 5 MG UNDER THE SKIN 1 TIME A WEEK (Patient not taking: Reported on 4/10/2024) 2 mL 0    PHENTERMINE HCL 37.5 MG Oral Tab TAKE 1 TABLET(37.5 MG) BY MOUTH BEFORE BREAKFAST 30 tablet 0    cyclobenzaprine 10 MG Oral Tab Take 1 tablet (10 mg total) by mouth nightly as needed. 30 tablet 0    TRI-MALIKA 0.01-4-0.05 % External Cream Apply 1 Application topically 2 (two) times daily. (Patient not taking: Reported on 2024)      atorvastatin 10 MG Oral Tab Take 1 tablet (10 mg total) by mouth nightly. (Patient not taking: Reported on 4/10/2024)      pantoprazole 40 MG Oral Tab EC Take 1 tablet (40 mg total) by  mouth before breakfast. 90 tablet 3    Empagliflozin (JARDIANCE) 25 MG Oral Tab Take 25 mg by mouth daily. 90 tablet 4    fluticasone propionate 50 MCG/ACT Nasal Suspension 2 sprays by Nasal route daily. 48 g 2    Cholecalciferol (VITAMIN D3) 1.25 MG (20369 UT) Oral Cap Take 1 capsule by mouth once a week. 12 capsule 4    levocetirizine 5 MG Oral Tab Take 1 tablet (5 mg total) by mouth every evening. 90 tablet 1    Estradiol (ESTRACE) 0.1 MG/GM Vaginal Cream Apply 1/2 gram vaginally 2 times per week. 1 Tube 3       ALLERGIES:   Allergies   Allergen Reactions    Clindamycin RASH       REVIEW OF SYSTEMS:   Review of Systems   Constitutional: Negative.    HENT: Negative.    Eyes: Negative.    Respiratory: Negative.    Cardiovascular: Negative.    Gastrointestinal: Negative.    Genitourinary: Negative.    Musculoskeletal: As per HPI  Skin: Negative.    Neurological: As per HPI  Endo/Heme/Allergies: Negative.    Psychiatric/Behavioral: Negative.      All other systems reviewed and are negative. Pertinent positives and negatives noted in the HPI.        PHYSICAL EXAM:   Pulse 100   Ht 66\"   Wt 165 lb (74.8 kg)   SpO2 98%   BMI 26.63 kg/m²     Body mass index is 26.63 kg/m².      General: No immediate distress  Head: Normocephalic/ Atraumatic  Eyes: Extra-occular movements intact.   Ears: No auricular hematoma or deformities  Mouth: No lesions or ulcerations  Heart: peripheral pulses intact. Normal capillary refill.   Lungs: Non-labored respirations  Abdomen: No abdominal guarding  Extremities: No lower extremity edema bilaterally   Skin: No lesions noted.   Cognition: alert & oriented x 3, attentive, able to follow 2 step commands, comprehention intact, spontaneous speech intact  Motor:    Musculoskeletal:        Data  Hospital Outpatient Visit on 04/10/2024   Component Date Value Ref Range Status    POC GLUCOSE 04/10/2024 91   Final   [facility] Lab Encounter on 12/11/2023   Component Date Value Ref Range Status    Occult  Blood Result 12/11/2023 Negative for Occult Blood  Negative for Occult Blood Final    C. Difficile Toxin B Gene 12/11/2023 Negative  Negative Final    Giardia Antigen Stool 12/11/2023 Negative  Negative Final    Cryptosporidium Antigen 12/11/2023 Negative  Negative Final    Stool Culture 12/11/2023 No Salmonella, Shigella, Campylobacter, Yersinia, Edwardsiella, Aeromonas or Plesiomonas isolated   Final    E Coli Shigatoxin 12/11/2023 Negative for Shigatoxins  Negative Final   Admission on 12/10/2023, Discharged on 12/10/2023   Component Date Value Ref Range Status    Urine Color 12/10/2023 Yellow  Yellow Final    Urine Clarity 12/10/2023 Clear  Clear Final    Specific Gravity, Urine 12/10/2023 >=1.030  1.005 - 1.030 Final    PH, Urine 12/10/2023 5.5  5.0 - 8.0 Final    Protein urine 12/10/2023 30 (A)  Negative mg/dL Final    Glucose, Urine 12/10/2023 100 (A)  Negative mg/dL Final    Ketone, Urine 12/10/2023 Negative  Negative mg/dL Final    Bilirubin, Urine 12/10/2023 Negative  Negative Final    Blood, Urine 12/10/2023 Negative  Negative Final    Nitrite Urine 12/10/2023 Negative  Negative Final    Urobilinogen urine 12/10/2023 <2.0  <2.0 mg/dL Final    Leukocyte esterase urine 12/10/2023 Negative  Negative Final    WBC IC 12/10/2023 7.0  4.0 - 11.0 x10ˆ3/uL Final    RBC IC 12/10/2023 5.90 (H)  3.80 - 5.30 X10ˆ6/uL Final    HGB IC 12/10/2023 16.2 (H)  12.0 - 16.0 g/dL Final    HCT IC 12/10/2023 50.3 (H)  35.0 - 48.0 % Final    MCV IC 12/10/2023 85.3  80.0 - 100.0 fL Final    MCH IC 12/10/2023 27.5  26.0 - 34.0 pg Final    MCHC IC 12/10/2023 32.2  31.0 - 37.0 g/dL Final    PLT IC 12/10/2023 341.0  150.0 - 450.0 X10ˆ3/uL Final    # Neutrophil 12/10/2023 4.0  1.5 - 7.7 X10ˆ3/uL Final    # Lymphocyte 12/10/2023 2.5  1.0 - 4.0 X10ˆ3/uL Final    # Mixed Cells 12/10/2023 0.5  0.1 - 1.0 X10ˆ3/uL Final    Neutrophil % 12/10/2023 56.3  % Final    Lymphocyte % 12/10/2023 36.0  % Final    Mixed Cell % 12/10/2023 7.7  % Final     ISTAT Sodium 12/10/2023 141  136 - 145 mmol/L Final    ISTAT BUN 12/10/2023 14  7 - 18 mg/dL Final    ISTAT Potassium 12/10/2023 3.5 (L)  3.6 - 5.1 mmol/L Final    ISTAT Chloride 12/10/2023 103  98 - 112 mmol/L Final    ISTAT Ionized Calcium 12/10/2023 1.17  1.12 - 1.32 mmol/L Final    ISTAT Hematocrit 12/10/2023 51 (H)  34 - 50 % Final    ISTAT Glucose 12/10/2023 86  70 - 99 mg/dL Final    ISTAT TCO2 12/10/2023 28  21 - 32 mmol/L Final    ISTAT Creatinine 12/10/2023 0.70  0.55 - 1.02 mg/dL Final    eGFR-Cr 12/10/2023 101  >=60 mL/min/1.73m2 Final   ]      Radiology Imaging:  I reviewed with the patient her MRI of the lumbar spine from 7/12/2023  PROCEDURE:  MRI SPINE LUMBAR (CPT=72148)     COMPARISON:  None.     INDICATIONS:  M48.061 Lumbar foraminal stenosis M51.36 Bulge of lumbar disc without myelopathy M47.816 Lumbar spondylosis M54.59 Mechanical low back pain M47.816 Facet syndr*     TECHNIQUE:  Multiplanar T1 and T2 weighted images including fat suppression sequences.  Images acquired in sagittal and axial planes.       PATIENT STATED HISTORY: (As transcribed by Technologist)  Patient states bilateral leg discomfort with lower back pain.      FINDINGS:  Alignment of the lumbar spine is normal.  Vertebral body heights are maintained throughout the lumbar spine.  Mild loss of L4-L5 disc space with mild endplate osteoarthritic changes.  Marrow signal is unremarkable.  The conus is at L1-L2.  The visualized portion of the spinal cord is of normal caliber without focal signal abnormality.  T12-L1: No disc herniation, spinal canal or neuroforaminal stenosis.  L1-L2: No disc herniation or spinal canal or neuroforaminal stenosis.  L2-L3: No disc herniation, spinal canal or neuroforaminal stenosis.  L3-L4:  Minimal disc bulge without spinal canal or neural foraminal stenosis.  L4-L5:  Minimal disc bulge without spinal canal stenosis.  No significant neural foraminal stenosis.  L5-S1:  Central disc herniation  superimposed on a broad-based disc bulge is noted.  Facet hypertrophic changes are noted.  Severe left and moderate right neural foraminal stenosis.                   Impression   CONCLUSION:       1. No significant spinal canal stenosis.     2. Disc bulges at L4-5 and L5-S1 are noted.  Severe left and moderate right neural foraminal stenosis at L5-S1.          LOCATION:  [facility]        Dictated by (CST): Bismark Cuello MD on 7/12/2023 at 3:13 PM      Finalized by (CST): Bismark Cuello MD on 7/12/2023 at 3:15 PM        ASSESSMENT AND PLAN:  Deborah hester a pleasant 58-year-old female presents for follow-up of her left-sided low back pain with radicular symptoms on the left leg.  She no longer has radiating symptoms on the left leg including pain or paresthesias.  This improved tremendously following the epidural injection.  She is mostly complaining of left-sided low back pain which is aggravated by standing and walking.  Therefore, I am recommending a left L4-L5 and L5-S1 facet joint injection under local anesthesia.  If her symptoms persist following this procedure, then I would recommend repeating the left L5 and left S1 transforaminal epidural steroid injection.       RTC in 2 weeks after procedure  Discharge Instructions were provided as documented in AVS summary.  The patient was in agreement with the assessment and plan.  All questions were answered.  There were no barriers to learning.         1. DDD (degenerative disc disease), lumbosacral    2. Lumbar radiculopathy    3. Lumbar foraminal stenosis    4. Bulge of lumbar disc without myelopathy    5. Lumbar spondylosis    6. Mechanical low back pain    7. Facet syndrome, lumbar    8. Myalgia    9. Biomechanical lesion    10. Scapular dyskinesis    11. Class 1 obesity due to excess calories with serious comorbidity and body mass index (BMI) of 30.0 to 30.9 in adult        Alex B. Behar MD  Physical Medicine and Rehabilitation/Sports Medicine  [place]  [facility] [place]

## 2024-04-29 NOTE — PATIENT INSTRUCTIONS
1) My office will call you to schedule the LEFt l4-L5 and L5-S1 facte joint injections under local anesthesia once the procedure is approved by your insurance carrier.    2) If symptoms do not improve with the facet joint injections, then we will repeat the epidural steroid injections   3) Follow up with me 2 weeks after the procedure. This can be in the office or virtually.

## 2024-04-30 ENCOUNTER — TELEPHONE (OUTPATIENT)
Dept: PHYSICAL MEDICINE AND REHAB | Facility: CLINIC | Age: 59
End: 2024-04-30

## 2024-04-30 DIAGNOSIS — M47.816 FACET SYNDROME, LUMBAR: Primary | ICD-10-CM

## 2024-04-30 NOTE — TELEPHONE ENCOUNTER
Initiated authorization for LEFT L4-L5 and L5-S1 facet joint injection CPT 11604, 59575 dx:M47.816 to be done at [facility] with [employer]  Case #[id number]  Status: Approved-authorization is not required per health plan based on medical necessity however is not a guarantee of payment and may be subject to review once claim is submitted

## 2024-05-01 NOTE — TELEPHONE ENCOUNTER
Estimated body mass index is 26.63 kg/m² as calculated from the following:    Height as of 4/29/24: 66\".    Weight as of 4/29/24: 165 lb.  No restrictions w/ procedure  Patient has been scheduled for Left L4-5 and L5-S1 facet joint injections on 6/5/24 at the [facility] with Dr. Behar.   -Anesthesia type:  Local  -Patient was advised that if he/she does receive the covid vaccine it needs to be at least 2 weeks before or after the injection.  -Medications and allergies reviewed.  -Patient reminded to hold NSAIDs (Ibuprofen, ASA 81, Aleve, Naproxen, Mobic, Diclofenac, Etodolac, Celebrex etc.) for 3 days prior to Lumbar MBB/Facet if BMI is greater than 35. For Cervical injections only hold multivitamins, Vitamin E, Fish Oil, Phentermine/Lomaira for 7 days prior to injection and NSAIDS.   mg to be held for 7 days prior to injections.  -If patient is receiving MAC/IVCS, weight loss oral/injectable medications will need to be held for 7 days prior to injection.  -Patient informed to fast 12 hours prior to procedure with IVCS/MAC.   -If on blood thinner, clearance has been received and approved to hold this medication by provider.   -Patient informed of [facility]'s  policy:  he/she will need a  to and from procedure and must be on site for their entirety of their visit, if their ride is unable to the procedure will be cancelled.   -[facility] is located in the [facility] 1st floor,  may park in the yellow/purple parking lot.  Patient verbalized understanding and agrees with plan.  Scheduled in Epic: Yes  Scheduled in Surgical Case: Yes  Follow up appointment made: NOV: Visit date not found

## 2024-05-06 ENCOUNTER — APPOINTMENT (OUTPATIENT)
Dept: PHYSICAL THERAPY | Facility: HOSPITAL | Age: 59
End: 2024-05-06
Attending: PHYSICAL MEDICINE & REHABILITATION
Payer: COMMERCIAL

## 2024-05-13 ENCOUNTER — APPOINTMENT (OUTPATIENT)
Dept: PHYSICAL THERAPY | Facility: HOSPITAL | Age: 59
End: 2024-05-13
Attending: PHYSICAL MEDICINE & REHABILITATION
Payer: COMMERCIAL

## 2024-05-20 ENCOUNTER — PATIENT MESSAGE (OUTPATIENT)
Dept: FAMILY MEDICINE CLINIC | Facility: CLINIC | Age: 59
End: 2024-05-20

## 2024-05-20 ENCOUNTER — HOSPITAL ENCOUNTER (OUTPATIENT)
Age: 59
Discharge: HOME | End: 2024-05-20

## 2024-05-20 ENCOUNTER — APPOINTMENT (OUTPATIENT)
Dept: PHYSICAL THERAPY | Facility: HOSPITAL | Age: 59
End: 2024-05-20
Attending: PHYSICAL MEDICINE & REHABILITATION
Payer: COMMERCIAL

## 2024-05-20 ENCOUNTER — NURSE TRIAGE (OUTPATIENT)
Dept: FAMILY MEDICINE CLINIC | Facility: CLINIC | Age: 59
End: 2024-05-20

## 2024-05-20 VITALS
DIASTOLIC BLOOD PRESSURE: 71 MMHG | RESPIRATION RATE: 18 BRPM | TEMPERATURE: 97 F | SYSTOLIC BLOOD PRESSURE: 131 MMHG | HEART RATE: 93 BPM | BODY MASS INDEX: 27.49 KG/M2 | OXYGEN SATURATION: 97 % | HEIGHT: 65 IN | WEIGHT: 165 LBS

## 2024-05-20 DIAGNOSIS — R30.0 DYSURIA: Primary | ICD-10-CM

## 2024-05-20 DIAGNOSIS — N89.8 VAGINAL IRRITATION: ICD-10-CM

## 2024-05-20 LAB
BILIRUB UR QL STRIP: NEGATIVE
CLARITY UR: CLEAR
COLOR UR: YELLOW
GLUCOSE UR STRIP-MCNC: >=1000 MG/DL
KETONES UR STRIP-MCNC: NEGATIVE MG/DL
LEUKOCYTE ESTERASE UR QL STRIP: NEGATIVE
NITRITE UR QL STRIP: NEGATIVE
PH UR STRIP: 6.5 [PH]
PROT UR STRIP-MCNC: NEGATIVE MG/DL
SP GR UR STRIP: 1.01
UROBILINOGEN UR STRIP-ACNC: <2 MG/DL

## 2024-05-20 PROCEDURE — 81002 URINALYSIS NONAUTO W/O SCOPE: CPT | Performed by: NURSE PRACTITIONER

## 2024-05-20 PROCEDURE — 87086 URINE CULTURE/COLONY COUNT: CPT | Performed by: NURSE PRACTITIONER

## 2024-05-20 PROCEDURE — 99213 OFFICE O/P EST LOW 20 MIN: CPT | Performed by: NURSE PRACTITIONER

## 2024-05-20 RX ORDER — FLUCONAZOLE 150 MG/1
150 TABLET ORAL
Qty: 2 TABLET | Refills: 0 | Status: SHIPPED | OUTPATIENT
Start: 2024-05-20

## 2024-05-20 NOTE — ED PROVIDER NOTES
Patient Seen in: [facility]      History     Chief Complaint   Patient presents with    Urinary Symptoms     Stated Complaint: painful urination    Subjective:   58-year-old female presents today with complaints of dysuria.  Denies any frequency urgency with urination.  Patient is diabetic.  Denies any abdominal or back pain.  Has had some vaginal irritation but no vaginal discharge.  Denies any fever or chills.  Alert oriented x 3.  No other symptoms or concerns.  The patient's medication list, past medical history and social history elements as listed in today's nurse's notes were reviewed and agreed (except as otherwise stated in the HPI).  The patient's family history reviewed and determined to be noncontributory to the presenting problem            Objective:   Past Medical History:    Anesthesia complication    Biliary calculus    Difficult intubation    Difficulty opening mouth wide during general anesthesia    Esophageal reflux    Hyperlipidemia    Lipid screening    per NG    Other ill-defined conditions(799.89)    per [employer]:  \"Abnormal pap smear; Management:  colposcopy x3\"    Sleep apnea    doest use mask    Type 2 diabetes mellitus without retinopathy (HCC)    Vitamin D deficiency              Past Surgical History:   Procedure Laterality Date    Cholecystectomy      Colonoscopy      Colonoscopy N/A 2018    few diverticula    Egd  2018    small HH, H Pylori gastritis    Egd      Ercp duct stent placement  2014    bile leak    Hysterectomy  1992    partial hystero.    Other surgical history      Bladder surgery                Social History     Socioeconomic History    Marital status:    Tobacco Use    Smoking status: Former     Current packs/day: 0.00     Types: Cigarettes     Start date: 1999     Quit date: 2012     Years since quittin.8    Smokeless tobacco: Never   Vaping Use    Vaping status: Never Used   Substance and Sexual Activity     Alcohol use: Not Currently     Comment: socially    Drug use: No    Sexual activity: Yes   Other Topics Concern    Caffeine Concern Yes     Comment: Coffee: 1 cup daily    Exercise Yes     Comment: Walking    Pt has a pacemaker No    Pt has a defibrillator No    Reaction to local anesthetic No   Social History Narrative    The patient does not use an assistive device..      The patient does not live in a home with stairs.     Social Determinants of Health     Financial Resource Strain: Low Risk  (9/30/2021)    [facility], [facility]    Overall Financial Resource Strain (CARDIA)     Difficulty of Paying Living Expenses: Not hard at all   Transportation Needs: No Transportation Needs (9/30/2021)    Received from [facility], [facility]    PRAPARE - Transportation     Lack of Transportation (Medical): No     Lack of Transportation (Non-Medical): No    Received from [facility], [facility]    Housing Stability              Review of Systems    Positive for stated complaint: painful urination  Other systems are as noted in HPI.  Constitutional and vital signs reviewed.      All other systems reviewed and negative except as noted above.    Physical Exam     ED Triage Vitals [05/20/24 1741]   /71   Pulse 93   Resp 18   Temp 97.1 °F (36.2 °C)   Temp src Temporal   SpO2 97 %   O2 Device None (Room air)       Current Vitals:   Vital Signs  BP: 131/71  Pulse: 93  Resp: 18  Temp: 97.1 °F (36.2 °C)  Temp src: Temporal    Oxygen Therapy  SpO2: 97 %  O2 Device: None (Room air)            Physical Exam  Vitals and nursing note reviewed.   Constitutional:       Appearance: Normal appearance.   HENT:      Mouth/Throat:      Mouth: Mucous membranes are moist.   Cardiovascular:      Rate and Rhythm: Normal rate.   Pulmonary:      Effort: Pulmonary effort is normal.   Abdominal:      Tenderness: There is no abdominal tenderness. There is  no guarding.   Skin:     General: Skin is warm and dry.   Neurological:      Mental Status: She is alert and oriented to person, place, and time.               ED Course     Labs Reviewed   [facility] POCT URINALYSIS DIPSTICK - Abnormal; Notable for the following components:       Result Value    Glucose, Urine >=1000 (*)     Blood, Urine Trace-Intact (*)     All other components within normal limits   URINE CULTURE, ROUTINE                      MDM     Please note that this report has been produced using speech recognition software and may contain errors related to that system including, but not limited to, errors in grammar, punctuation, and spelling, as well as words and phrases that possibly may have been recognized inappropriately.  If there are any questions or concerns, contact the dictating provider for clarification.        Note to patient: The 21st Century Cures Act makes medical notes like these available to patients in the interest of transparency. However, this is a medical document intended as peer to peer communication. It is written in medical language and may contain abbreviations or verbiage that are unfamiliar. It may appear blunt or direct. Medical documents are intended to carry relevant information, facts as evident, and the clinical opinion of the practitioner.                                   Medical Decision Making  Differential diagnosis includes but is not limited to: Cystitis, urethritis, pyelonephritis, kidney stone, vaginal candidiasis      Presents today complaints of urinary dysuria and vaginal irritation.  Urine dip was done which shows 1000 glucose with trace of blood.  Urine culture be sent to lab and is pending.  Did explain to patient with a high level of glucose in her urine vaginal irritation could be caused by yeast infection.  Will give prescription for Diflucan to take as directed.  Instructed patient to be also use Monistat cream to help with vaginal discomfort.  Patient will be  notified of any change to treatment plan based on the urine culture.  Patient otherwise to follow-up with gynecology if symptoms do not improve.  Patient verbalized understanding and agreed to plan of care.    Amount and/or Complexity of Data Reviewed  Labs: ordered. Decision-making details documented in ED Course.     Details: Urine dip  Urine culture    Risk  OTC drugs.  Prescription drug management.        Disposition and Plan     Clinical Impression:  1. Dysuria    2. Vaginal irritation         Disposition:  Discharge  5/20/2024  5:55 pm    Follow-up:  Brittanie Preciado MD  [address] [place] [id number]  [phone]    In 1 week  As needed          Medications Prescribed:  Discharge Medication List as of 5/20/2024  5:56 PM        START taking these medications    Details   fluconazole 150 MG Oral Tab Take 1 tablet (150 mg total) by mouth every third day., Normal, Disp-2 tablet, R-0

## 2024-05-20 NOTE — TELEPHONE ENCOUNTER
From: Deborah Sanders  To: Brittanie Preciado  Sent: 5/20/2024 11:19 AM CDT  Subject: Request a Urine Culture     Good Morning Dr. Preciado   I’m been having Pain & Burning when I Urine   I’m not sure if is a infection   Dr. Preciado I think I need a   Urine Culture to see if I have a Infection   Please Let me know if you need more information   Thank You!  Deborah Sanders

## 2024-05-20 NOTE — DISCHARGE INSTRUCTIONS
As we discussed I did send a culture for your urine.  If you have any bacterial growth will be notified and a prescription for antibiotic will be called in for you.  Take Diflucan as prescribed.  As we discussed this could be a yeast infection.  May also use over-the-counter Monistat 7.  Follow-up with your gynecologist if symptoms do not improve.

## 2024-05-20 NOTE — TELEPHONE ENCOUNTER
Patient complains of burning pain upon urination for 2 days.   Symptoms feel severe.  Last week had symptoms, but went away.   She denied fever.   No blood in urine.     Patient needs evaluation today.     There are no appointment available at clinics. Advised Immediate care near her. [place] location is closer to her home.       Reason for Disposition   SEVERE pain with urination    Protocols used: Urination Pain - Female-A-[place]

## 2024-05-29 ENCOUNTER — APPOINTMENT (OUTPATIENT)
Dept: PHYSICAL THERAPY | Facility: HOSPITAL | Age: 59
End: 2024-05-29
Attending: PHYSICAL MEDICINE & REHABILITATION
Payer: COMMERCIAL

## 2024-06-05 ENCOUNTER — APPOINTMENT (OUTPATIENT)
Dept: PHYSICAL THERAPY | Facility: HOSPITAL | Age: 59
End: 2024-06-05
Attending: PHYSICAL MEDICINE & REHABILITATION
Payer: COMMERCIAL

## 2024-06-06 ENCOUNTER — OFFICE VISIT (OUTPATIENT)
Dept: OBGYN CLINIC | Facility: CLINIC | Age: 59
End: 2024-06-06
Payer: COMMERCIAL

## 2024-06-06 VITALS
HEIGHT: 65 IN | DIASTOLIC BLOOD PRESSURE: 84 MMHG | SYSTOLIC BLOOD PRESSURE: 132 MMHG | WEIGHT: 170 LBS | BODY MASS INDEX: 28.32 KG/M2 | HEART RATE: 89 BPM

## 2024-06-06 DIAGNOSIS — Z12.31 ENCOUNTER FOR SCREENING MAMMOGRAM FOR MALIGNANT NEOPLASM OF BREAST: ICD-10-CM

## 2024-06-06 DIAGNOSIS — Z01.419 WELL WOMAN EXAM WITH ROUTINE GYNECOLOGICAL EXAM: Primary | ICD-10-CM

## 2024-06-06 DIAGNOSIS — N89.8 VAGINAL IRRITATION: ICD-10-CM

## 2024-06-06 DIAGNOSIS — Z12.72 SMEAR, VAGINAL, AS PART OF ROUTINE GYNECOLOGICAL EXAMINATION: ICD-10-CM

## 2024-06-06 DIAGNOSIS — Z01.419 SMEAR, VAGINAL, AS PART OF ROUTINE GYNECOLOGICAL EXAMINATION: ICD-10-CM

## 2024-06-06 PROCEDURE — 81514 NFCT DS BV&VAGINITIS DNA ALG: CPT | Performed by: NURSE PRACTITIONER

## 2024-06-06 PROCEDURE — 3008F BODY MASS INDEX DOCD: CPT | Performed by: NURSE PRACTITIONER

## 2024-06-06 PROCEDURE — 3079F DIAST BP 80-89 MM HG: CPT | Performed by: NURSE PRACTITIONER

## 2024-06-06 PROCEDURE — 3075F SYST BP GE 130 - 139MM HG: CPT | Performed by: NURSE PRACTITIONER

## 2024-06-06 PROCEDURE — 99396 PREV VISIT EST AGE 40-64: CPT | Performed by: NURSE PRACTITIONER

## 2024-06-06 RX ORDER — PHENTERMINE HYDROCHLORIDE 15 MG/1
CAPSULE ORAL
COMMUNITY
Start: 2024-05-28

## 2024-06-06 NOTE — PROGRESS NOTES
Subjective:  Chief Complaint   Patient presents with    Physical     Annual     58 year old female  presents for annual.    Pt also notes vaginal irritation  She has been working with Urogyn and using estrace cream, this has been working well  However she notes a recent worsening  Also notes pain with intercourse  Denies vaginal odor    No LMP recorded. Patient has had a hysterectomy.  Hx Prior Abnormal Pap: Yes  Pap Date: 21  Pap Result Notes: wnl  Menarche: 14 (2024  5:12 PM)  Period Cycle (Days): hysterectomy (2024  5:12 PM)  Period Duration (Days): n/a (2024  5:12 PM)  Period Flow: n/a (2024  5:12 PM)  Use of Birth Control (if yes, specify type): Hysterectomy (2024  5:12 PM)  Hx Prior Abnormal Pap: Yes (2024  5:12 PM)  Pap Date: 21 (2024  5:12 PM)  Pap Result Notes: wnl (2024  5:12 PM)     hysterectomy for precancerous cervical changes  Last Colonoscopy: 2023 - occult blood  Last Mammo:  2023    Denies family history of  ovarian and colon CA.  Sister with history of breast CA  Feeling safe at home.    Most Recent Immunizations   Administered Date(s) Administered    Covid-19 Vaccine Moderna 100 mcg/0.5 ml 2021    Covid-19 Vaccine Moderna 50 Mcg/0.25 Ml 10/30/2021    Covid-19 Vaccine Pfizer Bivalent 30mcg/0.3mL 2022    FLU VAC QIV SPLIT 3 YRS AND OLDER (16162) 2020    FLULAVAL 6 months & older 0.5 ml Prefilled syringe (24307) 2020    FLUZONE 6 months and older PFS 0.5 ml (34761) 10/14/2023    Influenza 2018    Pfizer Covid-19 Vaccine 30mcg/0.3ml 12yrs+ (8302-3952) 10/14/2023    Pneumococcal (Prevnar 13) 2015    Pneumovax 23 10/05/2021    TDAP 2015    Tb Intradermal Test 2022    Zoster Vaccine Recombinant Adjuvanted (Shingrix) 2020      reports that she quit smoking about 11 years ago. Her smoking use included cigarettes. She started smoking about 24 years ago. She has never used smokeless tobacco.    reports that she does not currently use alcohol.    Past Medical History:    Anesthesia complication    Biliary calculus    Difficult intubation    Difficulty opening mouth wide during general anesthesia    Esophageal reflux    Hyperlipidemia    Lipid screening    per NG    Other ill-defined conditions(799.89)    per [employer]:  \"Abnormal pap smear; Management:  colposcopy x3\"    Sleep apnea    doest use mask    Type 2 diabetes mellitus without retinopathy (HCC)    Vitamin D deficiency     Past Surgical History:   Procedure Laterality Date    Cholecystectomy  2014    Colonoscopy  2014    Colonoscopy N/A 06/12/2018    few diverticula    Egd  06/2018    small HH, H Pylori gastritis    Egd  2014    Ercp duct stent placement  03/2014    bile leak    Hysterectomy  1992    partial hystero.    Other surgical history  2008    Bladder surgery       Review of Systems:  Pertinent items are noted in the HPI.    Objective:  /84   Pulse 89   Ht 65\"   Wt 170 lb (77.1 kg)   BMI 28.29 kg/m²    Physical Examination:  General appearance: Well dressed, well nourished in no apparent distress  Neurologic/Psychiatric: Alert and oriented to person, place and time, mood normal, affect appropriate  Head: Normocephalic without obvious deformity, atraumatic  Neck: No thyromegaly, supple, non-tender, no masses, no adenopathy  Lungs: Clear to auscultation bilaterally, no rales, wheezes or rhonchi  Breasts: Symmetric, non-tender, no masses, lesions, retraction, dimpling or discharge bilaterally, no axillary or supraclavicular lymphadenopathy  Heart: Regular rate and rhythm, no gallops or murmurs  Abdomen: Soft, non-tender, non-distended, no masses, no hepatosplenomegaly, no hernias, no inguinal lymphadenopathy  Pelvic:    External genitalia- Normal, Bartholin's, urethra, skeins glands normal   Vagina- No vaginal lesions, physiologic discharge   Urethra- Non-tender, no masses   Urethral Meatus- No lesions or masses, no prolapse   Bladder-  Non-tender, no masses   Cervix- surgically absent   Uterus- surgically absent   Adnexa-  Non-tender, no masses   Anus/Perineum- Normal, no masses or lesions  Extremities: Non-tender, full range of motion, no clubbing, cyanosis or edema  Skin:  General inspection- no rashes, lesions or discoloration    Assessment/Plan:  Normal well-woman exam.  Yearly mammogram ordered  Pap smear obtained.      Declined chaperone for exam today     Diagnoses and all orders for this visit:    Well woman exam with routine gynecological exam  - self breast exam discussed and encouraged    Smear, vaginal, as part of routine gynecological examination  -     ThinPrep PAP Smear; Future    Encounter for screening mammogram for malignant neoplasm of breast  -     [facility] ANGEL 2D+3D SCREENING BILAT (CPT=77067/73370); Future    Vaginal irritation  -     Vaginitis Vaginosis PCR Panel; Future  - will treat based on culture results  - to follow up with Dr. Chavez as previously scheduleded         Return in about 1 year (around 6/6/2025) for annual well woman exam or sooner if needed.

## 2024-06-07 DIAGNOSIS — B37.31 YEAST VAGINITIS: Primary | ICD-10-CM

## 2024-06-07 LAB
BV BACTERIA DNA VAG QL NAA+PROBE: NEGATIVE
C GLABRATA DNA VAG QL NAA+PROBE: POSITIVE
C KRUSEI DNA VAG QL NAA+PROBE: NEGATIVE
CANDIDA DNA VAG QL NAA+PROBE: NEGATIVE
T VAGINALIS DNA VAG QL NAA+PROBE: NEGATIVE

## 2024-06-10 ENCOUNTER — OFFICE VISIT (OUTPATIENT)
Facility: CLINIC | Age: 59
End: 2024-06-10

## 2024-06-10 VITALS
HEART RATE: 92 BPM | OXYGEN SATURATION: 97 % | SYSTOLIC BLOOD PRESSURE: 138 MMHG | RESPIRATION RATE: 18 BRPM | DIASTOLIC BLOOD PRESSURE: 74 MMHG | HEIGHT: 65 IN | WEIGHT: 168 LBS | BODY MASS INDEX: 27.99 KG/M2

## 2024-06-10 DIAGNOSIS — E11.9 TYPE 2 DIABETES MELLITUS WITHOUT RETINOPATHY (HCC): ICD-10-CM

## 2024-06-10 DIAGNOSIS — G47.33 OSA ON CPAP: ICD-10-CM

## 2024-06-10 DIAGNOSIS — R63.2 BINGE EATING: ICD-10-CM

## 2024-06-10 DIAGNOSIS — Z51.81 ENCOUNTER FOR THERAPEUTIC DRUG LEVEL MONITORING: Primary | ICD-10-CM

## 2024-06-10 DIAGNOSIS — E66.3 OVERWEIGHT (BMI 25.0-29.9): ICD-10-CM

## 2024-06-10 DIAGNOSIS — E78.00 HYPERCHOLESTEROLEMIA: ICD-10-CM

## 2024-06-10 PROCEDURE — 3008F BODY MASS INDEX DOCD: CPT | Performed by: PHYSICIAN ASSISTANT

## 2024-06-10 PROCEDURE — 99214 OFFICE O/P EST MOD 30 MIN: CPT | Performed by: PHYSICIAN ASSISTANT

## 2024-06-10 PROCEDURE — 3075F SYST BP GE 130 - 139MM HG: CPT | Performed by: PHYSICIAN ASSISTANT

## 2024-06-10 PROCEDURE — 3078F DIAST BP <80 MM HG: CPT | Performed by: PHYSICIAN ASSISTANT

## 2024-06-10 RX ORDER — LISDEXAMFETAMINE DIMESYLATE 30 MG/1
30 CAPSULE ORAL DAILY
Qty: 30 CAPSULE | Refills: 0 | Status: SHIPPED | OUTPATIENT
Start: 2024-07-11 | End: 2024-08-10

## 2024-06-10 RX ORDER — TIRZEPATIDE 2.5 MG/.5ML
2.5 INJECTION, SOLUTION SUBCUTANEOUS WEEKLY
Qty: 2 ML | Refills: 0 | Status: SHIPPED | OUTPATIENT
Start: 2024-06-10

## 2024-06-10 RX ORDER — LISDEXAMFETAMINE DIMESYLATE 30 MG/1
30 CAPSULE ORAL DAILY
Qty: 30 CAPSULE | Refills: 0 | Status: SHIPPED | OUTPATIENT
Start: 2024-06-10 | End: 2024-07-10

## 2024-06-10 RX ORDER — LISDEXAMFETAMINE DIMESYLATE 30 MG/1
30 CAPSULE ORAL DAILY
Qty: 30 CAPSULE | Refills: 0 | Status: SHIPPED | OUTPATIENT
Start: 2024-08-11 | End: 2024-09-10

## 2024-06-10 NOTE — PROGRESS NOTES
HISTORY OF PRESENT ILLNESS  Chief Complaint   Patient presents with    Weight Check     +6lbs       Deborah Sanders is a 58 year old female here for follow up in medical weight loss program.   +6lbs  Compliant on phentermine, decreased dose  Denies chest pain, shortness of breath, dizziness, blurred vision, headache, paresthesia, nausea/vomiting.   Having side effects with ozempic, doesn't feel like it works as well as the mounjaro  Higher stress, more personal issues in the house  Exercise/Activity: walking,   Nutrition: 24 hour food log reviewed, eating regular meals, +protein  Stress: higher, has been walking to help        Wt Readings from Last 6 Encounters:   06/10/24 168 lb (76.2 kg)   06/06/24 170 lb (77.1 kg)   05/30/24 165 lb (74.8 kg)   05/20/24 165 lb (74.8 kg)   04/29/24 165 lb (74.8 kg)   04/08/24 165 lb (74.8 kg)            Breakfast Lunch Dinner Snacks Fluids   Reviewed           REVIEW OF SYSTEMS  GENERAL HEALTH: feels well otherwise, denied any fevers chills or night sweats   RESPIRATORY: denies shortness of breath   CARDIOVASCULAR: denies chest pain  GI: denies abdominal pain    EXAM  /74   Pulse 92   Resp 18   Ht 5' 5\" (1.651 m)   Wt 168 lb (76.2 kg)   SpO2 97%   BMI 27.96 kg/m²   GENERAL: well developed, well nourished,in no apparent distress, A/O x3  SKIN: no rashes,no suspicious lesions  HEENT: atraumatic, normocephalic, OP-clear, PERRL  NECK: supple,no adenopathy  LUNGS: clear to auscultation bilaterally   CARDIO: RRR without murmur  GI: good BS's,NT/ND, no masses or HSM  EXTREMITIES: no cyanosis, no clubbing, no edema    Lab Results   Component Value Date    WBC 6.8 06/02/2023    RBC 5.72 (H) 02/27/2022    HGB 16.4 06/02/2023    HCT 49.6 06/02/2023    MCV 85.3 12/10/2023    MCH 27.1 02/27/2022    MCHC 32.2 12/10/2023    RDW 13.2 02/27/2022     06/02/2023    MPV 7.8 12/06/2018     Lab Results   Component Value Date    GLU 86 06/02/2023    BUN 15 06/02/2023    BUNCREA 23.9  (H) 02/27/2022    CREATSERUM 0.71 06/02/2023    ANIONGAP 8 02/27/2022    GFRNAA 100 06/02/2023    GFRAA 114 02/27/2022    CA 9.4 06/02/2023    OSMOCALC 292 02/27/2022    ALKPHO 130 (H) 02/04/2023    AST 16 06/02/2023    ALT 16 06/02/2023    ALKPHOS 96 07/11/2015    BILT 0.5 06/02/2023    TP 7.2 06/02/2023    ALB 3.7 02/04/2023    GLOBULIN 4.0 02/27/2022    AGRATIO 1.1 07/11/2015     02/27/2022    K 4.2 02/27/2022     02/27/2022    CO2 27.0 02/27/2022     Lab Results   Component Value Date     (H) 02/27/2022    A1C 6.1 06/02/2023     Lab Results   Component Value Date    CHOLEST 214 06/02/2023    TRIG 140 06/02/2023    HDL 66 06/02/2023     (H) 02/04/2023    VLDL 24 06/02/2023    NONHDLC 148 06/02/2023    CALCNONHDL 163 (H) 07/11/2015     Lab Results   Component Value Date    T4F 1.1 11/07/2020    TSH 1.040 06/02/2023     Lab Results   Component Value Date    B12 371 02/27/2022    VITB12 496 07/11/2015     Lab Results   Component Value Date    VITD 32.7 02/27/2022    BGHG22CC 24.7 07/11/2015       Current Outpatient Medications on File Prior to Visit   Medication Sig Dispense Refill    Terconazole 0.8 % Vaginal Cream Place 1 applicator vaginally nightly for 7 days. 20 g 0    Phentermine HCl 15 MG Oral Cap       Empagliflozin (JARDIANCE) 25 MG Oral Tab Take 25 mg by mouth daily. 90 tablet 4    fluticasone propionate 50 MCG/ACT Nasal Suspension 2 sprays by Nasal route daily. 48 g 2    Cholecalciferol (VITAMIN D3) 1.25 MG (21951 UT) Oral Cap Take 1 capsule by mouth once a week. 12 capsule 4    Estradiol (ESTRACE) 0.1 MG/GM Vaginal Cream Apply 1/2 gram vaginally 2 times per week. 1 Tube 3    cyclobenzaprine 10 MG Oral Tab Take 1 tablet (10 mg total) by mouth nightly as needed. (Patient not taking: Reported on 5/20/2024) 30 tablet 0    TRI-MALIKA 0.01-4-0.05 % External Cream Apply 1 Application topically 2 (two) times daily. (Patient not taking: Reported on 6/10/2024)      levocetirizine 5 MG Oral  Tab Take 1 tablet (5 mg total) by mouth every evening. (Patient not taking: Reported on 6/10/2024) 90 tablet 1     No current facility-administered medications on file prior to visit.       ASSESSMENT  Analyzed weight data:       Diagnoses and all orders for this visit:    Encounter for therapeutic drug level monitoring    Overweight (BMI 25.0-29.9)    Type 2 diabetes mellitus without retinopathy (HCC)    Hypercholesterolemia    BASILIA on CPAP    Binge eating        PLAN  Initial Weight: 204lbs  Initial Weight Date: 12/2/22  Today's Weight: 168lbs  5% Goal: 10.2lbs  10% Goal: 20.4lbs  Total Weight Loss: +6lbs/Net loss 36lbs    Will begin Mounjaro 2.5mg weekly - denies any personal or family history of pancreatitis, pancreatic cancer, thyroid cancer, MEN2 - discussed MOA, advised side effects and adverse effects of medication.  Discontinue phentermine  BEDs- 7 questionnaire was positive for binge eating disorder   Begin vyvanse - discussed side effects including but not limited to:( elevated BP, tremor, anxiety, headache, constipation and serious side effects including arrhythmia and cad ) patient verbalized understanding agrees with the plan  DM - begin medications as prescribed, low carb diet  HLD - lifestyle changes  CPAP compliance  Consistency with logging foods - protein and produce  Incorporate strength/resistance training  Nutrition: low carb diet/ recommended to eat breakfast daily/ regular protein intake  Medication use and side effects reviewed with patient.  Medication contraindications: contrave,   Follow up with dietitian and psychologist as recommended.  Discussed the role of sleep and stress in weight management.  Counseled on comprehensive weight loss plan including attention to nutrition, exercise and behavior/stress management for success. See patient instruction below for more details.  Discussed strategies to overcome barriers to successful weight loss and weight maintenance  FITTE: ACSM  recommendations (150-300 minutes/ week in active weight loss)   Weight Loss consent to treat reviewed and signed       NOTE TO PATIENT: The 21st Century Cures Act makes clinical notes like these available to patients in the interest of transparency. Clinical notes are medical documents used by physicians and care providers to communicate with each other. These documents include medical language and terminology, abbreviations, and treatment information that may sound technical and at times possibly unfamiliar. In addition, at times, the verbiage may appear blunt or direct. These documents are one tool providers use to communicate relevant information and clinical opinions of the care providers in a way that allows common understanding of the clinical context.     There are no Patient Instructions on file for this visit.    No follow-ups on file.    Patient verbalizes understanding.    Joan Leigh PA-C  6/10/2024

## 2024-06-11 LAB
.: NORMAL
.: NORMAL

## 2024-06-14 RX ORDER — FLUTICASONE PROPIONATE 50 MCG
2 SPRAY, SUSPENSION (ML) NASAL DAILY
Qty: 48 G | Refills: 3 | Status: SHIPPED | OUTPATIENT
Start: 2024-06-14

## 2024-06-14 NOTE — TELEPHONE ENCOUNTER
Please review; protocol failed/ has no protocol    Requested Prescriptions   Pending Prescriptions Disp Refills    Cholecalciferol (VITAMIN D3) 1.25 MG (80661 UT) Oral Cap [Pharmacy Med Name: VITAMIN D3 50,000 IU (BRYAN) CAP] 12 capsule 3     Sig: Take 1 capsule by mouth once a week.       There is no refill protocol information for this order      Signed Prescriptions Disp Refills    fluticasone propionate 50 MCG/ACT Nasal Suspension 48 g 3     Si sprays by Nasal route daily.       Allergy Medication Protocol Passed - 6/10/2024  9:15 AM        Passed - In person appointment or virtual visit in the past 12 mos or appointment in next 3 mos     Recent Outpatient Visits              4 days ago Encounter for therapeutic drug level monitoring    [facility], [address] [place] [address] Joan Leigh PA-C    Office Visit    1 week ago Well woman exam with routine gynecological exam    [facility], [address] [place] - OB/GYN Jaky Mercedes APRN    Office Visit    1 month ago DDD (degenerative disc disease), lumbosacral    [facility], [place] Behar, Alex, MD    Office Visit    2 months ago Encounter for therapeutic drug level monitoring    [address] Joan Leigh PA-C    Telemedicine    3 months ago Lumbar radiculopathy    [facility], [place] Behar, Alex, MD    Office Visit          Future Appointments         Provider Department Appt Notes    Tomorrow BK DEXA RM1; BK XR RM1; BK SPARKLE RM1 [facility] - [address]     In 2 months Joan Leigh PA-C [address]     In 6 months Joan Leigh PA-C [address]     In 6 months Kwabena Strauss DPM [facility] 1yr foot check                       Recent  Outpatient Visits              4 days ago Encounter for therapeutic drug level monitoring    [facility], [address] [place] [address] Joan Leigh PA-C    Office Visit    1 week ago Well woman exam with routine gynecological exam    [facility], [place] [address], [place] - OB/GYN Jaky Mercedes, LADARIUS    Office Visit    1 month ago DDD (degenerative disc disease), lumbosacral    [facility], [facility], [place] Behar, Alex, MD    Office Visit    2 months ago Encounter for therapeutic drug level monitoring    [facility], [address] [place] [address] Joan Leigh PA-C    Telemedicine    3 months ago Lumbar radiculopathy    [facility], [facility], [place] Behar, Alex, MD    Office Visit          Future Appointments         Provider Department Appt Notes    Tomorrow BK DEXA RM1; BK XR RM1; BK SPARKLE RM1 [facility] Mammography - [address]     In 2 months Joan Leigh PA-C [facility], [address] [place] [address]     In 6 months Joan Leigh PA-C [facility], [address] [place] [address]     In 6 months Kwabena Strauss DPM [facility] 1yr foot check

## 2024-06-14 NOTE — TELEPHONE ENCOUNTER
Clarification sent    This Order Has Been Discontinued    Order Status Reason By On   Discontinued None Huma Mota, RN 5/20/24 2103

## 2024-06-14 NOTE — TELEPHONE ENCOUNTER
Refill passed per [facility] protocol.  Requested Prescriptions   Pending Prescriptions Disp Refills    FLUTICASONE PROPIONATE 50 MCG/ACT Nasal Suspension [Pharmacy Med Name: FLUTICASONE 50MCG NASAL SP (120) RX] 48 g 2     Sig: SHAKE LIQUID AND USE 2 SPRAYS IN EACH NOSTRIL DAILY       Allergy Medication Protocol Passed - 6/10/2024  9:15 AM        Passed - In person appointment or virtual visit in the past 12 mos or appointment in next 3 mos     Recent Outpatient Visits              4 days ago Encounter for therapeutic drug level monitoring    [facility], [address] [place] [address] Joan Leigh PA-C    Office Visit    1 week ago Well woman exam with routine gynecological exam    [facility], [place] [address], [place] - OB/GYN Jaky Mercedes APRN    Office Visit    1 month ago DDD (degenerative disc disease), lumbosacral    [facility] [place] Behar, Alex, MD    Office Visit    2 months ago Encounter for therapeutic drug level monitoring    [address] Joan Leigh PA-C    Telemedicine    3 months ago Lumbar radiculopathy    [facility] [place] Behar, Alex, MD    Office Visit          Future Appointments         Provider Department Appt Notes    Tomorrow BK DEXA RM1; BK XR RM1; BK SPARKLE RM1 [facility] Mammography - [address]     In 2 months Joan Leigh PA-C [address]     In 6 months LeighJoan dangelo PA-C [address]     In 6 months Kwabena Strauss DPM [facility] 1yr foot check                      VITAMIN D3 1.25 MG (17306 UT) Oral Cap [Pharmacy Med Name: VITAMIN D3 50,000 IU (BRYAN) CAP] 12 capsule 4     Sig: TAKE 1 CAPSULE BY MOUTH 1 TIME A WEEK       There is no refill protocol information for this  order        Recent Outpatient Visits              4 days ago Encounter for therapeutic drug level monitoring    [facility], [address] [place] [address] Joan Leigh PA-C    Office Visit    1 week ago Well woman exam with routine gynecological exam    [facility], [place] [address], [place] - OB/GYN Jaky Mercedes, LADARIUS    Office Visit    1 month ago DDD (degenerative disc disease), lumbosacral    [facility], [place] Behar, Alex, MD    Office Visit    2 months ago Encounter for therapeutic drug level monitoring    [facility], [address] [place] [address] Joan Leigh PA-C    Telemedicine    3 months ago Lumbar radiculopathy    [facility], [facility], [place] Behar, Alex, MD    Office Visit          Future Appointments         Provider Department Appt Notes    Tomorrow BK DEXA RM1; BK XR RM1; BK SPARKLE RM1 [facility]amm[facility] - [address]     In 2 months Joan Leigh PA-C [facility], [address] [place] [address]     In 6 months Joan Leigh PA-C [facility], [address] [place] [address]     In 6 months Kwabena Strauss DPM [facility] 1yr foot check

## 2024-06-15 RX ORDER — FOLIC ACID 1 MG/1
1 TABLET ORAL WEEKLY
Qty: 12 CAPSULE | Refills: 3 | Status: SHIPPED | OUTPATIENT
Start: 2024-06-15

## 2024-06-17 RX ORDER — PANTOPRAZOLE SODIUM 40 MG/1
40 TABLET, DELAYED RELEASE ORAL
Qty: 90 TABLET | Refills: 3 | OUTPATIENT
Start: 2024-06-17

## 2024-06-21 ENCOUNTER — TELEMEDICINE (OUTPATIENT)
Dept: PHYSICAL MEDICINE AND REHAB | Facility: CLINIC | Age: 59
End: 2024-06-21

## 2024-06-21 DIAGNOSIS — M79.10 MYALGIA: ICD-10-CM

## 2024-06-21 DIAGNOSIS — M47.816 LUMBAR SPONDYLOSIS: ICD-10-CM

## 2024-06-21 DIAGNOSIS — M54.59 MECHANICAL LOW BACK PAIN: ICD-10-CM

## 2024-06-21 DIAGNOSIS — M99.9 BIOMECHANICAL LESION: ICD-10-CM

## 2024-06-21 DIAGNOSIS — M48.061 LUMBAR FORAMINAL STENOSIS: ICD-10-CM

## 2024-06-21 DIAGNOSIS — M51.37 DDD (DEGENERATIVE DISC DISEASE), LUMBOSACRAL: ICD-10-CM

## 2024-06-21 DIAGNOSIS — M51.36 BULGE OF LUMBAR DISC WITHOUT MYELOPATHY: ICD-10-CM

## 2024-06-21 DIAGNOSIS — M47.816 FACET SYNDROME, LUMBAR: Primary | ICD-10-CM

## 2024-06-21 NOTE — PATIENT INSTRUCTIONS
1) Tylenol 500-1000 mg every 6-8 hours as needed for pain.  No more than 3000 mg daily.  2) Continue home exercise program   3) follow-up with me as needed

## 2024-06-21 NOTE — PROGRESS NOTES
[facility]  Video Visit Progress Note      Telehealth outside of [facility]  Telehealth Verbal Consent   I conducted a telehealth visit with Deborah Sanders today, 06/21/24, which was completed using two-way, real-time interactive audio and video communication. This has been done in good linda to provide continuity of care in the best interest of the provider-patient relationship, due to the COVID -19 public health crisis/national emergency where restrictions of face-to-face office visits are ongoing. Every conscious effort was taken to allow for sufficient and adequate time to complete the visit.  The patient was made aware of the limitations of the telehealth visit, including treatment limitations as no physical exam could be performed.  The patient was advised to call 911 or to go to the ER in case there was an emergency.  The patient was also advised of the potential privacy & security concerns related to the telehealth platform.   The patient was made aware of where to find [facility]'s notice of privacy practices, telehealth consent form and other related consent forms and documents.  which are located on the [facility] website. The patient verbally agreed to telehealth consent form, related consents and the risks discussed.    Lastly, the patient confirmed that they were in [place].   Included in this visit, time may have been spent reviewing labs, medications, radiology tests and decision making. Appropriate medical decision-making and tests are ordered as detailed in the plan of care above.  Coding/billing information is submitted for this visit based on complexity of care and/or time spent for the visit.    CHIEF COMPLAINT:    Chief Complaint   Patient presents with    Injection    Low Back Pain       History of Present Illness:  The patient is a 58 year old  right-handed female with significant past medical history of hyperlipidemia who presents for follow-up of her low  back pain with radiation down left leg.  She is status post left L5 and left S1 transforaminal epidural steroid injection where she had 100% relief that lasted for about 3 to 4 days and then her low back started bothering her further.  After that, we performed a left L4-L5 and L5-S1 facet joint injection on 2024 which provided 90% relief of her symptoms.  She denies any radiating pain.  She rates her pain a 0-1 out of 10.  She does not take any medications for pain.    PAST MEDICAL HISTORY:  Past Medical History:    Anesthesia complication    Biliary calculus    Difficult intubation    Difficulty opening mouth wide during general anesthesia    Esophageal reflux    Hyperlipidemia    Lipid screening    per NG    Other ill-defined conditions(799.89)    per [employer]:  \"Abnormal pap smear; Management:  colposcopy x3\"    Sleep apnea    doest use mask    Type 2 diabetes mellitus without retinopathy (HCC)    Vitamin D deficiency       SURGICAL HISTORY:  Past Surgical History:   Procedure Laterality Date    Cholecystectomy      Colonoscopy      Colonoscopy N/A 2018    few diverticula    Egd  2018    small HH, H Pylori gastritis    Egd      Ercp duct stent placement  2014    bile leak    Hysterectomy      partial hystero.    Other surgical history      Bladder surgery       SOCIAL HISTORY:   Social History     Occupational History    Not on file   Tobacco Use    Smoking status: Former     Current packs/day: 0.00     Types: Cigarettes     Start date: 1999     Quit date: 2012     Years since quittin.9    Smokeless tobacco: Never   Vaping Use    Vaping status: Never Used   Substance and Sexual Activity    Alcohol use: Not Currently     Comment: socially    Drug use: No    Sexual activity: Yes     Partners: Male     Birth control/protection: Hysterectomy       FAMILY HISTORY:   Family History   Problem Relation Age of Onset    Cancer Mother         Uterus Cancer; age 40 cause of  death    Diabetes Father     Cancer Father         Lung CA    Breast Cancer Sister 49    Glaucoma Neg     Macular degeneration Neg        CURRENT MEDICATIONS:   Current Outpatient Medications   Medication Sig Dispense Refill    Cholecalciferol (VITAMIN D3) 1.25 MG (37308 UT) Oral Cap Take 1 capsule by mouth once a week. 12 capsule 3    fluticasone propionate 50 MCG/ACT Nasal Suspension 2 sprays by Nasal route daily. 48 g 3    Tirzepatide (MOUNJARO) 2.5 MG/0.5ML Subcutaneous Solution Pen-injector Inject 2.5 mg into the skin once a week. 2 mL 0    lisdexamfetamine (VYVANSE) 30 MG Oral Cap Take 1 capsule (30 mg total) by mouth daily. 30 capsule 0    [START ON 7/11/2024] lisdexamfetamine (VYVANSE) 30 MG Oral Cap Take 1 capsule (30 mg total) by mouth daily. 30 capsule 0    [START ON 8/11/2024] lisdexamfetamine (VYVANSE) 30 MG Oral Cap Take 1 capsule (30 mg total) by mouth daily. 30 capsule 0    Phentermine HCl 15 MG Oral Cap       cyclobenzaprine 10 MG Oral Tab Take 1 tablet (10 mg total) by mouth nightly as needed. (Patient not taking: Reported on 5/20/2024) 30 tablet 0    TRI-MALIKA 0.01-4-0.05 % External Cream Apply 1 Application topically 2 (two) times daily. (Patient not taking: Reported on 6/10/2024)      Empagliflozin (JARDIANCE) 25 MG Oral Tab Take 25 mg by mouth daily. 90 tablet 4    levocetirizine 5 MG Oral Tab Take 1 tablet (5 mg total) by mouth every evening. (Patient not taking: Reported on 6/10/2024) 90 tablet 1    Estradiol (ESTRACE) 0.1 MG/GM Vaginal Cream Apply 1/2 gram vaginally 2 times per week. 1 Tube 3       ALLERGIES:   Allergies   Allergen Reactions    Clindamycin RASH       REVIEW OF SYSTEMS:   Review of Systems   Constitutional: Negative.    HENT: Negative.    Eyes: Negative.    Respiratory: Negative.    Cardiovascular: Negative.    Gastrointestinal: Negative.    Genitourinary: Negative.    Musculoskeletal: As per HPI  Skin: Negative.    Neurological: As per HPI  Endo/Heme/Allergies: Negative.     Psychiatric/Behavioral: Negative.      All other systems reviewed and are negative. Pertinent positives and negatives noted in the HPI.        PHYSICAL EXAM:     There is no height or weight on file to calculate BMI.    General: No immediate distress  Head: Normocephalic/ Atraumatic  Eyes: Extra-occular movements intact  Ears/Nose/Throat:  External appearance identifies normal appearance without obvious deformity  Cardiovascular: No cyanosis, clubbing or edema  Respiratory: Non-labored respirations  Skin: No lesions noted   Neurological: alert & oriented x 3, attentive, able to follow commands, comprehention intact, spontaneous speech intact  Psychiatric: Mood and affect appropriate        Data  Office Visit on 06/06/2024   Component Date Value Ref Range Status    Bacterial Vaginosis 06/06/2024 Negative  Negative Final    Candida group 06/06/2024 Negative  Negative Final    Nakaseomyces glabrata (Candida gla* 06/06/2024 Positive (A)  Negative Final    Pichia kudriavzevii (Jeni sandra* 06/06/2024 Negative  Negative Final    Trichomonas vaginalis 06/06/2024 Negative  Negative Final    Diagnosis:  06/06/2024 NEGATIVE FOR INTRAEPITHELIAL LESION OR MALIGNANCY.   Final    Specimen Adequacy: 06/06/2024 Comment   Final    Performed By: 06/06/2024 Comment   Final    . 06/06/2024 .   Final    Note: 06/06/2024 Comment   Final    Test Methodology: 06/06/2024 Comment   Final    Clinician provided ICD10: 06/06/2024 Comment   Final   Admission on 05/20/2024, Discharged on 05/20/2024   Component Date Value Ref Range Status    Urine Color 05/20/2024 Yellow  Yellow Final    Urine Clarity 05/20/2024 Clear  Clear Final    Specific Gravity, Urine 05/20/2024 1.015  1.005 - 1.030 Final    PH, Urine 05/20/2024 6.5  5.0 - 8.0 Final    Protein urine 05/20/2024 Negative  Negative mg/dL Final    Glucose, Urine 05/20/2024 >=1000 (A)  Negative mg/dL Final    Ketone, Urine 05/20/2024 Negative  Negative mg/dL Final    Bilirubin, Urine  05/20/2024 Negative  Negative Final    Blood, Urine 05/20/2024 Trace-Intact (A)  Negative Final    Nitrite Urine 05/20/2024 Negative  Negative Final    Urobilinogen urine 05/20/2024 <2.0  <2.0 mg/dL Final    Leukocyte esterase urine 05/20/2024 Negative  Negative Final    Urine Culture 05/20/2024 No Growth at 18-24 hrs.   Final   Hospital Outpatient Visit on 04/10/2024   Component Date Value Ref Range Status    POC GLUCOSE 04/10/2024 91   Final   ]      Radiology Imaging:  I reviewed with the patient her MRI of the lumbar spine from 7/12/2023  PROCEDURE:  MRI SPINE LUMBAR (CPT=72148)     COMPARISON:  None.     INDICATIONS:  M48.061 Lumbar foraminal stenosis M51.36 Bulge of lumbar disc without myelopathy M47.816 Lumbar spondylosis M54.59 Mechanical low back pain M47.816 Facet syndr*     TECHNIQUE:  Multiplanar T1 and T2 weighted images including fat suppression sequences.  Images acquired in sagittal and axial planes.       PATIENT STATED HISTORY: (As transcribed by Technologist)  Patient states bilateral leg discomfort with lower back pain.      FINDINGS:  Alignment of the lumbar spine is normal.  Vertebral body heights are maintained throughout the lumbar spine.  Mild loss of L4-L5 disc space with mild endplate osteoarthritic changes.  Marrow signal is unremarkable.  The conus is at L1-L2.  The visualized portion of the spinal cord is of normal caliber without focal signal abnormality.  T12-L1: No disc herniation, spinal canal or neuroforaminal stenosis.  L1-L2: No disc herniation or spinal canal or neuroforaminal stenosis.  L2-L3: No disc herniation, spinal canal or neuroforaminal stenosis.  L3-L4:  Minimal disc bulge without spinal canal or neural foraminal stenosis.  L4-L5:  Minimal disc bulge without spinal canal stenosis.  No significant neural foraminal stenosis.  L5-S1:  Central disc herniation superimposed on a broad-based disc bulge is noted.  Facet hypertrophic changes are noted.  Severe left and moderate  right neural foraminal stenosis.                   Impression   CONCLUSION:       1. No significant spinal canal stenosis.     2. Disc bulges at L4-5 and L5-S1 are noted.  Severe left and moderate right neural foraminal stenosis at L5-S1.          LOCATION:  [facility]        Dictated by (CST): Bismark Cuello MD on 7/12/2023 at 3:13 PM      Finalized by (CST): Bismark Cuello MD on 7/12/2023 at 3:15 PM       ASSESSMENT AND PLAN:  Deborah is a pleasant 58-year-old female presents for follow-up of her left-sided low back pain.  She is status post left L4-L5 and L5-S1 facet joint injection with nearly 100% improvement in her symptoms.  At this time, recommend she continue home exercise program and Tylenol for pain.  She will follow-up with me if her symptoms return.       RTC in as needed  Discharge Instructions were provided as documented in AVS summary.  The patient was in agreement with the assessment and plan.  All questions were answered.  There were no barriers to learning.    We discussed that a telemedicine visit is in place of an office visit; however, this limits the ability to perform a thorough physical examination which may affect objective findings related to a specific condition and can affect treatment.  We also discussed that NSAIDs may mask or worsen COVID-19 infection symptoms.  The patient was also informed that corticosteroids, in any form, may significantly decrease immune response and may increase risk and complications of infection.    The patient was advised that given the current situation with COVID-19, it is in his/her best interest to socially distance his/herself. Given this, we are not recommending any elective procedures or office visits at the outpatient surgery center or in the office respectively unless deemed necessary.  My staff will be reaching out to the patient for the elective procedure when it is considered appropriate and the patient can follow-up in office as well when  appropriate.  In the meantime, I will be available for telephone and video encounter.          1. Facet syndrome, lumbar    2. DDD (degenerative disc disease), lumbosacral    3. Bulge of lumbar disc without myelopathy        Alex B. Behar MD  Physical Medicine and Rehabilitation/Sports Medicine  [facility]

## 2024-06-22 ENCOUNTER — HOSPITAL ENCOUNTER (OUTPATIENT)
Dept: MAMMOGRAPHY | Age: 59
Discharge: HOME | End: 2024-06-22
Attending: NURSE PRACTITIONER

## 2024-06-22 DIAGNOSIS — Z12.31 ENCOUNTER FOR SCREENING MAMMOGRAM FOR MALIGNANT NEOPLASM OF BREAST: ICD-10-CM

## 2024-06-22 PROCEDURE — 77063 BREAST TOMOSYNTHESIS BI: CPT | Performed by: NURSE PRACTITIONER

## 2024-06-22 PROCEDURE — 77067 SCR MAMMO BI INCL CAD: CPT | Performed by: NURSE PRACTITIONER

## 2024-06-25 NOTE — PROGRESS NOTES
Called to follow up with patient.    Notified of provider review and recommendations.     MCM sent with contact information for high risk breast clinic as recommended by Jaky Mercedes. Patient agreeable to follow up. All questions answered.

## 2024-07-01 NOTE — TELEPHONE ENCOUNTER
2nd letter mailed to pt home address Pt calling thinks that she has pancreatitis. Vomiting and dry heaving. Pain in upper abdomen, Wants to know if you think she should go to the ER     Pt [phone]   Alert and oriented, no focal deficits, no motor or sensory deficits.

## 2024-07-07 DIAGNOSIS — E66.3 OVERWEIGHT (BMI 25.0-29.9): ICD-10-CM

## 2024-07-07 DIAGNOSIS — E11.9 TYPE 2 DIABETES MELLITUS WITHOUT RETINOPATHY (HCC): ICD-10-CM

## 2024-07-07 DIAGNOSIS — Z51.81 ENCOUNTER FOR THERAPEUTIC DRUG LEVEL MONITORING: ICD-10-CM

## 2024-07-08 RX ORDER — TIRZEPATIDE 2.5 MG/.5ML
INJECTION, SOLUTION SUBCUTANEOUS
Qty: 2 ML | Refills: 0 | OUTPATIENT
Start: 2024-07-08

## 2024-07-08 NOTE — TELEPHONE ENCOUNTER
Requesting Mounjaro increase  LOV: 6/10/24  RTC: not noted  Last Relevant Labs: 6/2/23  Filled: 6/10/24 #2 ml with 0 refills  2.5 mg dose    Future Appointments   Date Time Provider Department Center   9/10/2024  2:00 PM Joan Leigh PA-C [id number] [address]   12/30/2024  7:20 AM Joan Leigh PA-C [id number] [address]

## 2024-07-09 RX ORDER — TIRZEPATIDE 5 MG/.5ML
5 INJECTION, SOLUTION SUBCUTANEOUS WEEKLY
Qty: 2 ML | Refills: 0 | Status: SHIPPED | OUTPATIENT
Start: 2024-07-09 | End: 2024-07-31

## 2024-07-12 DIAGNOSIS — B37.31 YEAST VAGINITIS: ICD-10-CM

## 2024-07-12 RX ORDER — EMPAGLIFLOZIN 25 MG/1
1 TABLET, FILM COATED ORAL DAILY
Qty: 90 TABLET | Refills: 0 | Status: SHIPPED | OUTPATIENT
Start: 2024-07-12

## 2024-07-12 NOTE — TELEPHONE ENCOUNTER
Please review; protocol failed/No Protocol    Last Office Visit: 09/18/2023  Sent [employer] message to patient to schedule an appointment.     Requested Prescriptions   Pending Prescriptions Disp Refills    JARDIANCE 25 MG Oral Tab [Pharmacy Med Name: JARDIANCE 25MG TABLETS] 90 tablet 4     Sig: TAKE 1 TABLET BY MOUTH DAILY       Diabetes Medication Protocol Failed - 7/9/2024 10:43 AM        Failed - Last A1C < 7.5 and within past 6 months     Lab Results   Component Value Date    A1C 6.1 06/02/2023             Failed - In person appointment or virtual visit in the past 6 mos or appointment in next 3 mos     Recent Outpatient Visits              3 weeks ago Facet syndrome, lumbar    [facility] [place] Behar, Alex, MD    Telemedicine    1 month ago Encounter for therapeutic drug level monitoring    [address] Joan Leigh PA-C    Office Visit    1 month ago Well woman exam with routine gynecological exam    [facility], [address] [place] - OB/GYN Jaky Mercedes APRN    Office Visit    2 months ago DDD (degenerative disc disease), lumbosacral    [facility] [place] Behar, Alex, MD    Office Visit    3 months ago Encounter for therapeutic drug level monitoring    [address] Joan Leigh PA-C    Telemedicine          Future Appointments         Provider Department Appt Notes    In 2 months Joan Leigh PA-C [address]     In 5 months Joan Leigh PA-C [address]     In 5 months Kwabena Strauss DPM [facility] 1yr foot check                    Failed - Microalbumin procedure in past 12 months or taking ACE/ARB        Passed - EGFRCR or GFRNAA > 50     GFR  Evaluation  EGFRCR: 101 , resulted on 12/10/2023          Passed - GFR in the past 12 months           Future Appointments         Provider Department Appt Notes    In 2 months Joan Leigh PA-C [facility], [address] [place] [address]     In 5 months Joan Leigh PA-C [facility], [address] [place] [address]     In 5 months Kwabena Strauss DPM [facility] 1yr foot check          Recent Outpatient Visits              3 weeks ago Facet syndrome, lumbar    [facility] [place] Behar, Alex, MD    Telemedicine    1 month ago Encounter for therapeutic drug level monitoring    [facility], [address] [place] [address] Joan Leigh PA-C    Office Visit    1 month ago Well woman exam with routine gynecological exam    [facility], [place] [address], [place] - OB/GYN Jaky Mercedes APRN    Office Visit    2 months ago DDD (degenerative disc disease), lumbosacral    [facility] [place] Behar, Alex, MD    Office Visit    3 months ago Encounter for therapeutic drug level monitoring    [facility], [address] [place] [address] Joan Leigh PA-C    Telemedicine

## 2024-07-24 ENCOUNTER — OFFICE VISIT (OUTPATIENT)
Dept: UROLOGY | Facility: HOSPITAL | Age: 59
End: 2024-07-24
Attending: PHYSICIAN ASSISTANT
Payer: COMMERCIAL

## 2024-07-24 VITALS — HEIGHT: 65 IN | WEIGHT: 156 LBS | RESPIRATION RATE: 18 BRPM | BODY MASS INDEX: 25.99 KG/M2

## 2024-07-24 DIAGNOSIS — R39.89 BLADDER PAIN: ICD-10-CM

## 2024-07-24 DIAGNOSIS — N81.84 PELVIC MUSCLE WASTING: ICD-10-CM

## 2024-07-24 DIAGNOSIS — N95.2 POSTMENOPAUSAL ATROPHIC VAGINITIS: ICD-10-CM

## 2024-07-24 DIAGNOSIS — Z98.890 POST-OPERATIVE STATE: ICD-10-CM

## 2024-07-24 DIAGNOSIS — R39.14 FEELING OF INCOMPLETE BLADDER EMPTYING: ICD-10-CM

## 2024-07-24 DIAGNOSIS — N94.10 DYSPAREUNIA, FEMALE: Primary | ICD-10-CM

## 2024-07-24 DIAGNOSIS — N89.8 VAGINAL DISCHARGE: ICD-10-CM

## 2024-07-24 DIAGNOSIS — N39.41 URGE URINARY INCONTINENCE: ICD-10-CM

## 2024-07-24 LAB
BILIRUB UR QL: NEGATIVE
CLARITY UR: CLEAR
CONTROL RUN WITHIN 24 HOURS?: YES
GLUCOSE UR-MCNC: >1000 MG/DL
KETONES UR-MCNC: NEGATIVE MG/DL
LEUKOCYTE ESTERASE UR QL STRIP.AUTO: 500
NITRITE URINE: NEGATIVE
PH UR: 6.5 [PH] (ref 5–8)
PROT UR-MCNC: NEGATIVE MG/DL
SP GR UR STRIP: 1.01 (ref 1–1.03)
UROBILINOGEN UR STRIP-ACNC: NORMAL

## 2024-07-24 PROCEDURE — 87088 URINE BACTERIA CULTURE: CPT | Performed by: PHYSICIAN ASSISTANT

## 2024-07-24 PROCEDURE — 51701 INSERT BLADDER CATHETER: CPT | Performed by: PHYSICIAN ASSISTANT

## 2024-07-24 PROCEDURE — 81514 NFCT DS BV&VAGINITIS DNA ALG: CPT | Performed by: PHYSICIAN ASSISTANT

## 2024-07-24 PROCEDURE — 87186 SC STD MICRODIL/AGAR DIL: CPT | Performed by: PHYSICIAN ASSISTANT

## 2024-07-24 PROCEDURE — 81001 URINALYSIS AUTO W/SCOPE: CPT | Performed by: PHYSICIAN ASSISTANT

## 2024-07-24 PROCEDURE — 87086 URINE CULTURE/COLONY COUNT: CPT | Performed by: PHYSICIAN ASSISTANT

## 2024-07-24 PROCEDURE — 81002 URINALYSIS NONAUTO W/O SCOPE: CPT | Performed by: PHYSICIAN ASSISTANT

## 2024-07-24 NOTE — PATIENT INSTRUCTIONS
Name Address City Phone Contact   [facility] [address] [place] [phone]    [facility] 2[address] [facility] [address] [place] [phone] [facility] [address] [place] [phone]    [facility] Therapy 1900 [facility] [address] [place] [phone] Lopez Chavez   [facility] 2151 [facility] [phone] Gloriashar Cansecomazin [facility] 1130 [facility] [phone]    [facility] 500 [facility] [phone] Nany Kline    [facility] Physiotherapy 5 [facility] [phone] [facility]'s [facility] 2111 E [facility] [phone] Maryjane Lewis   Rehabilitation Services at [facility] 245 [facility] [address] [place] [phone] Katerina [facility] Therapy 220 [facility] [address] [place] [phone]    [facility] [address] [place] [phone] Renée Downing   [facility] 1455 [facility] [phone] Jenni Velasquez   [facility] [address] [phone] Gloria Jeffrey   [facility] 0645 [facility] [phone] Daria Cheng   [facility]  4721 [facility] [phone] Lonny Mcgraw Physical Therapy 676 [facility] 950 [place] [phone]    [facility] 4001 [facility] [address] [place] [phone] Jb Enciso   [facility] Physical Therapy  680 [facility]  [address] & 815 [place] [phone]    [facility]  5157 [facility] [phone] Alva Cooper    [facility]  3047 [facility].  [address] [place] [phone] Nany Sergeant Daniels    Physical Therapy,  4849 [facility] [address] [place] [phone] Akira Zimmermanluz   [facility] Physical Therapy [address] [place] [phone] Raisa Dotson   Rehabilitation Services at [address] [phone] [facility] 400 [facility] [phone]  Francine Curran   [facility]  [address] [facility] [address] [place] [phone] Layla Workman   [facility] 3551 [facility] [phone] Abel Harrington   [facility] [address] [place] [phone] [address] [facility] 1200 [facility] [phone] Ami [facility] Therapy 536 [facility] [address] [place] [phone]    [facility] 429 [facility] [phone] Marisela Sullivan   [facility],  N [facility], [address] [place] [phone] Erin iNchols   [facility] 111 W.[facility]. [place] [phone] [facility] 528 [facility] [phone] Salud Kerrillo   [facility] 1729 [facility] [phone] Deisy Magana   Rehabilitation Services at [facility] [address] [place] [phone] Khad[facility] Therapy [address] Manuel Uribe [phone]    [facility] [address] [place] [phone] Niru Holly   [facility] Rehabilitation Services  At [place] [address], [place] 300 [place] [phone] Gloria Huggins   Rehabilitation Services at [facility] 1475 [address] [phone] Floresita Alvarez    [facility] 480 [facility] [address] [place] [phone]    [place] [facility] [address] [facility] [address] [place] [phone] Kami Leyva   [facility] Physical Therapy [address] Aj Jackson [phone] Raisa [facility] Therapy 40 [facility] [address] [phone]    [facility] 59628 [facility] [phone] Lucia Hicks   [facility] 3080 [facility] [phone] Princess [facility] Therapy 1504 [facility]. [address] [place] [phone]    [facility]  3 [facility] [phone] Delberta [facility] Rehab Services [address] [place] [phone] Princess Patton   [facility] Specialists [address] [place] [phone] Sandy [facility] Therapy 88172 [facility] [phone]    [facility] Therapy 430 [facility]. [address] [place] [phone]    [facility] Physical Therapy [place] 1019 [facility] [phone] Zuleima Teofilo [place] [facility] [address] [facility] [address] [place] [phone] Mary Lewis   [facility] [address] [place] [phone] Nany Vega, PT, DPT, WCS, BCB-PMD 1634 [facility] [phone] Renetta Vega   [facility] Physical Therapy [facility] [address] [facility] [phone] Harriett Lance   [facility] [place] 2940 [facility] [address] [place] [phone] Melony [facility] Therapy 640 [facility] [address] [place] [phone]    [facility] Therapy [address] [place] [phone]    [facility]: [place]  [address] [place] [phone]    [facility] 1695 Jazmyn Quinn [phone]    [facility] [address] [place] [phone] Jenifer Ferguson   Rehabilitation Services at [facility] 636 [facility] [address] [place] [phone] Lydia Mcgraw Physical Therapy 1 [facility] [address] [place] [phone]    [facility] Services [facility]  [address] [facility] [phone] Luh Mercedes   [place] [facility] Location  [address]  [address] [place] [phone]    [facility] [address] [place] [phone] Jadyn Haas   [facility] [address]193 [address] [facility] [phone] Raisa [facility]  81025 [facility] [phone] [facility] Physical Therapy [address] [facility] [phone] [facility] 1875 [facility] [address] [place] [phone]    [facility] 320 N. [facility] [phone] Mary Arrieta Physical Therapy [facility] 828 [facility] [phone]    [facility] 310 [facility] [phone] [facility] 1400 [facility] [phone] Jadyn [facility] Therapy [address] [place] [address] [phone]    [facility]: [place] [address] [facility] A, 2nd [facility] [phone]    [facility] 7339 [facility] [phone] [facility] [place] Physical Therapy [facility] 301 E [facility] [phone] Gloria [facility] Therapy 102 W. [facility] [phone]    Rehabilitation Services at  [facility] 544 [facility] [phone] Sandy Aguirre   Rehabilitation Services at [facility] 2900 [facility] [phone]  Marisela Shipley   [facility] 1820 W [facility] [phone] Felecia Puga   [facility] Phys[facility]otherapy 710 [facility] [phone] Carina Sargent   [facility] Physical Therapy 330 Division [facility] [phone] Briseida Manzanares   [facility] [address] [place] [phone] Yudelka Sheets   [facility] Rehabilitation Services [facility] [place]  2111 [facility] [place] [phone] Kalpana Rudd   [facility] [place] 2111 [facility] [place] [phone] Kalpana Rudd   [facility] 52518 [facility] [phone] Natasha Sloan   [facility] Physical Therapy 00901 [facility] [phone]    [facility] Physical Therapy 55114 [facility] [phone]    [facility] [address] [place] [phone]    [facility] Physical Therapy [address] [place] [address] [place] [phone] Renu Jeimy   [facility] Physical Therapy 801 N [facility] [address] [place] [phone] Caitlin Howe   [facility] Physical Therapy [address] [place] [phone]    [facility] Physical Therapy 7 [facility] [address] [place] [phone] Ivory Erolreta   [facility] Rehab Services 25 N [facility] [phone] [facility]   [facility] [address] [place] [phone] [facility] Health & Fitness [place] [address] [place] [phone]    [facility] Physical Therapy 970 [facility]  [address] [phone]   Ramona Venegas   [facility] [address] [phone] Odalis Nguyen    [facility] 3913 W [facility], [address] [phone] Essence Hawkins

## 2024-07-24 NOTE — PROGRESS NOTES
She is s/p exc of vaginal scar, anterior repair, posterior colpoperneorrhaphy, MUS and cysto on 04/18/19     Reports bladder pain and dyspareunia x 1 month    Saw GYN last month, tx for yeast infection (candida glabrata, tx with terconzazole)  Sx began around same time  Denies current vag discharge/itching but requesting swab to check for infection    Reports increased urinary urgency, decreased volume of urine, feeling of incomplete bladder emptying    Rare UUI  Denies VERONIQUE    Denies gross hematuria  Some occ spotting during intercourse  Not using lube  Doesn't have a uterus    Using vag estrogen 2x weekly  Bowels reg    UDS 3/11/19 IMPRESSION:   372/150cc & 424/35cc  +DO at 361cc  residential 363cc  + VERONIQUE @ 200cc      Urogynecology Summary:  Urogynecology Summary  Prolapse: No  VERONIQUE: No  Urge Incontinence: Yes  Nocturia Frequency: 1 (2x these past few days)  Frequency: 2 hours  Incomplete emptying: Yes  Constipation: No  Wears pad day?: 0  Wears Pad Night?: 0  Activities are limited by UI/POP?: No  Currently Sexually Active: Yes    Vitals:  Resp 18   Ht 65\"   Wt 156 lb (70.8 kg)   BMI 25.96 kg/m²     GENERAL EXAM:  GENERAL: alert & oriented, NAD  HEENT: NC/AT, sclera without injection  SKIN: no rashes  LUNGS:  without increased respiratory effort  ABDOMEN: soft, non-tender, non-distended, no masses appreciated  EXT: no edema  : tolerated vaginal exam. Suture site well healed. No active bleeding. +scant discharge, swab collected.  Good support, +scarring under urethra right of midline tender to palpation, no mesh erosion. +PF muscle tenderness, strength 2/5 and unable to hold    Catrina, MA, present for exam as a chaperone    Pt voided 100 mL in the privacy of the bathroom  After obtaining verbal consent from the patient, sterile technique used to prep urethra and collect urine.  PVR 20 mL.  Sent for UA & culture.    Impression/Plan:    ICD-10-CM    1. Dyspareunia, female  N94.10 PHYSICAL THERAPY - External Location       2. Bladder pain  R39.89 Urine Culture, Routine     POCT urinalysis dipstick[63290]     Straight Cath PVR     PHYSICAL THERAPY - External Location     Urinalysis, Routine      3. Feeling of incomplete bladder emptying  R39.14 Urine Culture, Routine     POCT urinalysis dipstick[47440]     Straight Cath PVR     PHYSICAL THERAPY - External Location      4. Vaginal discharge  N89.8 Vaginitis Vaginosis PCR Panel      5. Urge urinary incontinence  N39.41 PHYSICAL THERAPY - External Location     Urinalysis, Routine      6. Pelvic muscle wasting  N81.84 PHYSICAL THERAPY - External Location          Discussion Items:   Discussed mgmt of vulvovaginal atrophy with vaginal estrogen cream. Reviewed associated benefits, risks, alternatives, and goals. Recommend low dose 2x/week treatment.    Treatment Plan:   Follow urine & vag swab results, tx if +  Start PFPT  Continue low dose vag estrogen 1/2 g twice weekly  Lube with intercourse  Bowel regimen  Bladder diet/drill  Call with s/sx of UTI    Plan for follow up of PFPT (phone) in 4 months, sooner prn    All questions answered  She understands and agrees to plan    Aishwarya Alas PA-C    Note to patient: The 21st Century Cures Act makes medical notes like these available to patients in the interest of transparency.  However, be advised this is a medical document.  It is intended as peer to peer communication.  It is written in medical language and may contain abbreviations or verbiage that are unfamiliar.  It may appear blunt or direct.  Medical documents are intended to carry relevant information, facts as evident, and the clinical opinion of the practitioner.

## 2024-07-26 ENCOUNTER — TELEPHONE (OUTPATIENT)
Dept: UROLOGY | Facility: CLINIC | Age: 59
End: 2024-07-26

## 2024-07-26 RX ORDER — NITROFURANTOIN 25; 75 MG/1; MG/1
100 CAPSULE ORAL 2 TIMES DAILY
Qty: 14 CAPSULE | Refills: 0 | Status: SHIPPED | OUTPATIENT
Start: 2024-07-26

## 2024-07-30 ENCOUNTER — TELEPHONE (OUTPATIENT)
Dept: UROLOGY | Facility: HOSPITAL | Age: 59
End: 2024-07-30

## 2024-07-30 DIAGNOSIS — B37.9 CANDIDA GLABRATA INFECTION: Primary | ICD-10-CM

## 2024-07-30 NOTE — TELEPHONE ENCOUNTER
Outgoing call to Deborah to inform her that the urine culture was positive for  10-50K E-Coli and she needs to complete the entire 7 day course of Macrobid.  Pt also informed that the vaginal swab collected on 7/24/24 shows that she has a yeast infection .  KAYE Alas reviewed results and TORB Boric acid 600 mg Vaginally at night x 14 days. (Do not swallow) Rx will be sent to patient preferred pharmacy.  Pt verbalized understanding.

## 2024-08-04 DIAGNOSIS — E66.3 OVERWEIGHT (BMI 25.0-29.9): ICD-10-CM

## 2024-08-04 DIAGNOSIS — E11.9 TYPE 2 DIABETES MELLITUS WITHOUT RETINOPATHY (HCC): ICD-10-CM

## 2024-08-04 DIAGNOSIS — Z51.81 ENCOUNTER FOR THERAPEUTIC DRUG LEVEL MONITORING: ICD-10-CM

## 2024-08-04 RX ORDER — TIRZEPATIDE 2.5 MG/.5ML
2.5 INJECTION, SOLUTION SUBCUTANEOUS WEEKLY
Qty: 2 ML | Refills: 0 | Status: CANCELLED | OUTPATIENT
Start: 2024-08-04

## 2024-08-05 DIAGNOSIS — E66.3 OVERWEIGHT (BMI 25.0-29.9): ICD-10-CM

## 2024-08-05 DIAGNOSIS — E11.9 TYPE 2 DIABETES MELLITUS WITHOUT RETINOPATHY (HCC): ICD-10-CM

## 2024-08-05 DIAGNOSIS — Z51.81 ENCOUNTER FOR THERAPEUTIC DRUG LEVEL MONITORING: ICD-10-CM

## 2024-08-05 RX ORDER — TIRZEPATIDE 5 MG/.5ML
5 INJECTION, SOLUTION SUBCUTANEOUS WEEKLY
Qty: 2 ML | Refills: 0 | Status: CANCELLED | OUTPATIENT
Start: 2024-08-05 | End: 2024-08-27

## 2024-08-05 NOTE — TELEPHONE ENCOUNTER
Requesting   Requested Prescriptions     Pending Prescriptions Disp Refills    Tirzepatide (MOUNJARO) 5 MG/0.5ML Subcutaneous Solution Pen-injector 2 mL 0     Sig: Inject 5 mg into the skin once a week for 4 doses.      LOV: 06/10/24  RTC:   Last Relevant Labs:   Filled: 06/10/24 #2ml with 0 refills    Future Appointments   Date Time Provider Department Center   9/10/2024  2:00 PM Joan Leigh PA-C [facility] [address]   10/10/2024  9:00 AM Brittanie Preciado MD ECADOFM EC ADO   11/12/2024  1:30 PM Aishwarya Alas PA UROG [facility]   12/30/2024  7:20 AM Joan Leigh PA-C [facility] [address]   12/30/2024  9:00 AM Kwabena Strauss DPM [id number]

## 2024-08-07 RX ORDER — TIRZEPATIDE 7.5 MG/.5ML
7.5 INJECTION, SOLUTION SUBCUTANEOUS WEEKLY
Qty: 2 ML | Refills: 0 | Status: SHIPPED | OUTPATIENT
Start: 2024-08-07 | End: 2024-08-29

## 2024-08-07 NOTE — TELEPHONE ENCOUNTER
Requesting   Requested Prescriptions     Pending Prescriptions Disp Refills    Tirzepatide (MOUNJARO) 7.5 MG/0.5ML Subcutaneous Solution Pen-injector 2 mL 0     Sig: Inject 7.5 mg into the skin once a week for 4 doses.      LOV: 06/10/24  RTC: 3mo  Last Relevant Labs:   Filled: 06/10/24 #2ml with 0 refills    Future Appointments   Date Time Provider Department Center   9/10/2024  2:00 PM Joan Leigh PA-C [facility] [address]   10/10/2024  9:00 AM Brittanie Preciado MD [facility]AD[facility] EC ADO   11/12/2024  1:30 PM Aishwarya Alas PA [facility]   12/30/2024  7:20 AM Joan Leigh PA-C [facility] [address]   12/30/2024  9:00 AM Kwabena Strauss DPM [id number]

## 2024-09-04 DIAGNOSIS — E11.9 TYPE 2 DIABETES MELLITUS WITHOUT RETINOPATHY (HCC): ICD-10-CM

## 2024-09-04 DIAGNOSIS — Z51.81 ENCOUNTER FOR THERAPEUTIC DRUG LEVEL MONITORING: ICD-10-CM

## 2024-09-04 DIAGNOSIS — E66.3 OVERWEIGHT (BMI 25.0-29.9): ICD-10-CM

## 2024-09-04 RX ORDER — TIRZEPATIDE 2.5 MG/.5ML
2.5 INJECTION, SOLUTION SUBCUTANEOUS WEEKLY
Qty: 2 ML | Refills: 0 | Status: CANCELLED | OUTPATIENT
Start: 2024-09-04

## 2024-09-05 RX ORDER — TIRZEPATIDE 7.5 MG/.5ML
7.5 INJECTION, SOLUTION SUBCUTANEOUS WEEKLY
COMMUNITY
Start: 2024-08-09 | End: 2024-09-05

## 2024-09-05 NOTE — TELEPHONE ENCOUNTER
Requesting   Requested Prescriptions     Pending Prescriptions Disp Refills    MOUNJARO 7.5 MG/0.5ML Subcutaneous Solution Pen-injector 2 mL 0     Sig: Inject 7.5 mg into the skin once a week.      LOV: 06/10/24  RTC:   Last Relevant Labs:   Filled: 8/9/24 #2ml with 0 refills    Future Appointments   Date Time Provider Department Center   9/10/2024  2:00 PM Joan Leigh PA-C [facility] [address]   10/10/2024  9:00 AM Brittanie Preciado MD ECADOFM EC ADO   11/12/2024  1:30 PM Aishwarya Alas PA [facility]   12/30/2024  7:20 AM Joan Leigh PA-C [facility] [address]   12/30/2024  9:00 AM Kwabena Strauss DPM [id number]

## 2024-09-08 RX ORDER — TIRZEPATIDE 7.5 MG/.5ML
7.5 INJECTION, SOLUTION SUBCUTANEOUS WEEKLY
Qty: 2 ML | Refills: 0 | Status: SHIPPED | OUTPATIENT
Start: 2024-09-08

## 2024-09-10 ENCOUNTER — TELEMEDICINE (OUTPATIENT)
Facility: CLINIC | Age: 59
End: 2024-09-10
Payer: COMMERCIAL

## 2024-09-10 DIAGNOSIS — G47.33 OSA ON CPAP: ICD-10-CM

## 2024-09-10 DIAGNOSIS — Z51.81 ENCOUNTER FOR THERAPEUTIC DRUG LEVEL MONITORING: Primary | ICD-10-CM

## 2024-09-10 DIAGNOSIS — E66.3 OVERWEIGHT (BMI 25.0-29.9): ICD-10-CM

## 2024-09-10 DIAGNOSIS — E11.9 TYPE 2 DIABETES MELLITUS WITHOUT RETINOPATHY (HCC): ICD-10-CM

## 2024-09-10 DIAGNOSIS — R63.2 BINGE EATING: ICD-10-CM

## 2024-09-10 DIAGNOSIS — E78.00 HYPERCHOLESTEROLEMIA: ICD-10-CM

## 2024-09-10 PROCEDURE — 99213 OFFICE O/P EST LOW 20 MIN: CPT | Performed by: PHYSICIAN ASSISTANT

## 2024-09-10 RX ORDER — LISDEXAMFETAMINE DIMESYLATE 30 MG/1
30 CAPSULE ORAL DAILY
Qty: 30 CAPSULE | Refills: 0 | Status: SHIPPED | OUTPATIENT
Start: 2024-10-11 | End: 2024-11-10

## 2024-09-10 RX ORDER — LISDEXAMFETAMINE DIMESYLATE 30 MG/1
30 CAPSULE ORAL DAILY
Qty: 30 CAPSULE | Refills: 0 | Status: SHIPPED | OUTPATIENT
Start: 2024-11-11 | End: 2024-12-11

## 2024-09-10 RX ORDER — LISDEXAMFETAMINE DIMESYLATE 30 MG/1
30 CAPSULE ORAL DAILY
Qty: 30 CAPSULE | Refills: 0 | Status: SHIPPED | OUTPATIENT
Start: 2024-09-10 | End: 2024-10-10

## 2024-09-10 NOTE — PROGRESS NOTES
This visit is conducted using Telemedicine with live, interactive video and audio.    Patient has been referred to the [facility] website at [email or web address] to review the yearly Consent to Treat document.    Patient understands and accepts financial responsibility for any deductible, co-insurance and/or co-pays associated with this service.      HISTORY OF PRESENT ILLNESS  Chief Complaint   Patient presents with    Weight Check       Deborah Sanders is a 59 year old female here for follow up in medical weight loss program.   155lbs  Pt is compliant on mounjaro, vyvanse   Denies chest pain, shortness of breath, dizziness, blurred vision, headache, paresthesia, nausea/vomiting.   Trying to get more veggies, fruits, protein    Exercise/Activity: walking  Nutrition: 24 hour food log reviewed, eating regular meals, +protein  Stress: manageable  Sleep: 6 hours/night         Wt Readings from Last 6 Encounters:   07/24/24 156 lb (70.8 kg)   06/10/24 168 lb (76.2 kg)   06/06/24 170 lb (77.1 kg)   05/30/24 165 lb (74.8 kg)   05/20/24 165 lb (74.8 kg)   04/29/24 165 lb (74.8 kg)            Breakfast Lunch Dinner Snacks Fluids              REVIEW OF SYSTEMS  GENERAL HEALTH: feels well otherwise, denied any fevers chills or night sweats   RESPIRATORY: denies shortness of breath   CARDIOVASCULAR: denies chest pain  GI: denies abdominal pain    EXAM  There were no vitals taken for this visit.  GENERAL: well developed, well nourished,in no apparent distress, A/O x3  SKIN: no rashes,no suspicious lesions on visible skin  HEENT: atraumatic, normocephalic  LUNGS: no increased effort or work with breathing   NEURO: speaking fluently and in clear sentences    Lab Results   Component Value Date    WBC 6.8 06/02/2023    RBC 5.72 (H) 02/27/2022    HGB 16.4 06/02/2023    HCT 49.6 06/02/2023    MCV 85.3 12/10/2023    MCH 27.1 02/27/2022    MCHC 32.2 12/10/2023    RDW 13.2 02/27/2022     06/02/2023    MPV 7.8 12/06/2018     Lab  Results   Component Value Date    GLU 86 06/02/2023    BUN 15 06/02/2023    BUNCREA 23.9 (H) 02/27/2022    CREATSERUM 0.71 06/02/2023    ANIONGAP 8 02/27/2022    GFRNAA 100 06/02/2023    GFRAA 114 02/27/2022    CA 9.4 06/02/2023    OSMOCALC 292 02/27/2022    ALKPHO 130 (H) 02/04/2023    AST 16 06/02/2023    ALT 16 06/02/2023    ALKPHOS 96 07/11/2015    BILT 0.5 06/02/2023    TP 7.2 06/02/2023    ALB 3.7 02/04/2023    GLOBULIN 4.0 02/27/2022    AGRATIO 1.1 07/11/2015     02/27/2022    K 4.2 02/27/2022     02/27/2022    CO2 27.0 02/27/2022     Lab Results   Component Value Date     (H) 02/27/2022    A1C 6.1 06/02/2023     Lab Results   Component Value Date    CHOLEST 214 06/02/2023    TRIG 140 06/02/2023    HDL 66 06/02/2023     (H) 02/04/2023    VLDL 24 06/02/2023    NONHDLC 148 06/02/2023    CALCNONHDL 163 (H) 07/11/2015     Lab Results   Component Value Date    T4F 1.1 11/07/2020    TSH 1.040 06/02/2023     Lab Results   Component Value Date    B12 371 02/27/2022    VITB12 496 07/11/2015     Lab Results   Component Value Date    VITD 32.7 02/27/2022    IQBK97HA 24.7 07/11/2015       Current Outpatient Medications on File Prior to Visit   Medication Sig Dispense Refill    MOUNJARO 7.5 MG/0.5ML Subcutaneous Solution Pen-injector Inject 7.5 mg into the skin once a week. 2 mL 0    nitrofurantoin monohydrate macro (MACROBID) 100 MG Oral Cap Take 1 capsule (100 mg total) by mouth 2 (two) times daily. 14 capsule 0    Empagliflozin (JARDIANCE) 25 MG Oral Tab Take 1 tablet by mouth daily. 90 tablet 0    Cholecalciferol (VITAMIN D3) 1.25 MG (47614 UT) Oral Cap Take 1 capsule by mouth once a week. 12 capsule 3    fluticasone propionate 50 MCG/ACT Nasal Suspension 2 sprays by Nasal route daily. 48 g 3    Tirzepatide (MOUNJARO) 2.5 MG/0.5ML Subcutaneous Solution Pen-injector Inject 2.5 mg into the skin once a week. 2 mL 0    lisdexamfetamine (VYVANSE) 30 MG Oral Cap Take 1 capsule (30 mg total) by  mouth daily. 30 capsule 0    Phentermine HCl 15 MG Oral Cap       cyclobenzaprine 10 MG Oral Tab Take 1 tablet (10 mg total) by mouth nightly as needed. (Patient not taking: Reported on 5/20/2024) 30 tablet 0    TRI-MALIKA 0.01-4-0.05 % External Cream Apply 1 Application topically 2 (two) times daily. (Patient not taking: Reported on 6/10/2024)      levocetirizine 5 MG Oral Tab Take 1 tablet (5 mg total) by mouth every evening. (Patient not taking: Reported on 6/10/2024) 90 tablet 1    Estradiol (ESTRACE) 0.1 MG/GM Vaginal Cream Apply 1/2 gram vaginally 2 times per week. 1 Tube 3     No current facility-administered medications on file prior to visit.       ASSESSMENT  Analyzed weight data:       Diagnoses and all orders for this visit:    Encounter for therapeutic drug level monitoring  -     lisdexamfetamine (VYVANSE) 30 MG Oral Cap; Take 1 capsule (30 mg total) by mouth daily.  -     lisdexamfetamine (VYVANSE) 30 MG Oral Cap; Take 1 capsule (30 mg total) by mouth daily.  -     lisdexamfetamine (VYVANSE) 30 MG Oral Cap; Take 1 capsule (30 mg total) by mouth daily.    Overweight (BMI 25.0-29.9)  -     lisdexamfetamine (VYVANSE) 30 MG Oral Cap; Take 1 capsule (30 mg total) by mouth daily.  -     lisdexamfetamine (VYVANSE) 30 MG Oral Cap; Take 1 capsule (30 mg total) by mouth daily.  -     lisdexamfetamine (VYVANSE) 30 MG Oral Cap; Take 1 capsule (30 mg total) by mouth daily.    Binge eating  -     lisdexamfetamine (VYVANSE) 30 MG Oral Cap; Take 1 capsule (30 mg total) by mouth daily.  -     lisdexamfetamine (VYVANSE) 30 MG Oral Cap; Take 1 capsule (30 mg total) by mouth daily.  -     lisdexamfetamine (VYVANSE) 30 MG Oral Cap; Take 1 capsule (30 mg total) by mouth daily.    Type 2 diabetes mellitus without retinopathy (HCC)    Hypercholesterolemia    BASILIA on CPAP        PLAN  Initial Weight: 204lbs  Initial Weight Date: 12/2/22  Today's Weight: 155lbs  5% Goal: 10.2lbs  10% Goal: 20.4lbs  Total Weight Loss: -13lbs/Net  loss 49lbs    Will continue vyvanse and mounjaro - advised side effects and adverse effects of medication   Continue to work on mindful eating  DM - stable, continue current medications, low carb diet  HLD - lifestyle changes  CPAP compliance  Incorporate strength/resistance training  Consistency with logging foods - focus on trying to get more protein  Nutrition: low carb diet/ recommended to eat breakfast daily/ regular protein intake  Medication use and side effects reviewed with patient.  Medication contraindications: contrave  Follow up with dietitian and psychologist as recommended.  Discussed the role of sleep and stress in weight management.  Counseled on comprehensive weight loss plan including attention to nutrition, exercise and behavior/stress management for success. See patient instruction below for more details.  Discussed strategies to overcome barriers to successful weight loss and weight maintenance  FITTE: ACSM recommendations (150-300 minutes/ week in active weight loss)   Weight Loss consent to treat reviewed and signed     There are no Patient Instructions on file for this visit.    No follow-ups on file.    Patient verbalizes understanding.    Joan Leigh PA-C  9/10/2024    Please note that the following visit was completed using two-way, real-time interactive audio and video communication.  This has been done in good linda to provide continuity of care in the best interest of the provider-patient relationship, due to the ongoing public health crisis/national emergency and because of restrictions of visitation.  There are limitations of this visit as no physical exam could be performed.  Every conscious effort was taken to allow for sufficient and adequate time.  This billing was spent on reviewing labs, medications, radiology tests and decision making.  Appropriate medical decision-making and tests are ordered as detailed in the plan of care above    Joan Leigh PA-C

## 2024-10-03 DIAGNOSIS — E11.9 TYPE 2 DIABETES MELLITUS WITHOUT RETINOPATHY (HCC): ICD-10-CM

## 2024-10-03 DIAGNOSIS — Z51.81 ENCOUNTER FOR THERAPEUTIC DRUG LEVEL MONITORING: Primary | ICD-10-CM

## 2024-10-04 RX ORDER — TIRZEPATIDE 7.5 MG/.5ML
7.5 INJECTION, SOLUTION SUBCUTANEOUS WEEKLY
Qty: 2 ML | Refills: 0 | OUTPATIENT
Start: 2024-10-04

## 2024-10-10 ENCOUNTER — OFFICE VISIT (OUTPATIENT)
Dept: FAMILY MEDICINE CLINIC | Facility: CLINIC | Age: 59
End: 2024-10-10
Payer: COMMERCIAL

## 2024-10-10 ENCOUNTER — LAB ENCOUNTER (OUTPATIENT)
Dept: LAB | Age: 59
End: 2024-10-10
Attending: FAMILY MEDICINE
Payer: COMMERCIAL

## 2024-10-10 ENCOUNTER — PATIENT MESSAGE (OUTPATIENT)
Facility: CLINIC | Age: 59
End: 2024-10-10

## 2024-10-10 VITALS
HEART RATE: 88 BPM | DIASTOLIC BLOOD PRESSURE: 77 MMHG | SYSTOLIC BLOOD PRESSURE: 114 MMHG | WEIGHT: 158.19 LBS | BODY MASS INDEX: 26.35 KG/M2 | HEIGHT: 65 IN

## 2024-10-10 DIAGNOSIS — M79.89 SOFT TISSUE MASS: ICD-10-CM

## 2024-10-10 DIAGNOSIS — Z00.00 ROUTINE MEDICAL EXAM: ICD-10-CM

## 2024-10-10 DIAGNOSIS — E55.9 VITAMIN D DEFICIENCY: ICD-10-CM

## 2024-10-10 DIAGNOSIS — E11.9 TYPE 2 DIABETES MELLITUS WITHOUT RETINOPATHY (HCC): Primary | ICD-10-CM

## 2024-10-10 DIAGNOSIS — E11.9 TYPE 2 DIABETES MELLITUS WITHOUT RETINOPATHY (HCC): ICD-10-CM

## 2024-10-10 LAB
ALBUMIN SERPL-MCNC: 4.9 G/DL (ref 3.2–4.8)
ALBUMIN/GLOB SERPL: 1.7 {RATIO} (ref 1–2)
ALP LIVER SERPL-CCNC: 122 U/L
ALT SERPL-CCNC: 14 U/L
ANION GAP SERPL CALC-SCNC: 4 MMOL/L (ref 0–18)
AST SERPL-CCNC: 17 U/L (ref ?–34)
BASOPHILS # BLD AUTO: 0.04 X10(3) UL (ref 0–0.2)
BASOPHILS NFR BLD AUTO: 0.6 %
BILIRUB SERPL-MCNC: 0.9 MG/DL (ref 0.3–1.2)
BUN BLD-MCNC: 18 MG/DL (ref 9–23)
BUN/CREAT SERPL: 21.4 (ref 10–20)
CALCIUM BLD-MCNC: 10.3 MG/DL (ref 8.7–10.4)
CHLORIDE SERPL-SCNC: 107 MMOL/L (ref 98–112)
CHOLEST SERPL-MCNC: 251 MG/DL (ref ?–200)
CO2 SERPL-SCNC: 31 MMOL/L (ref 21–32)
CREAT BLD-MCNC: 0.84 MG/DL
CREAT UR-SCNC: 97.1 MG/DL
DEPRECATED RDW RBC AUTO: 40.4 FL (ref 35.1–46.3)
EGFRCR SERPLBLD CKD-EPI 2021: 80 ML/MIN/1.73M2 (ref 60–?)
EOSINOPHIL # BLD AUTO: 0.21 X10(3) UL (ref 0–0.7)
EOSINOPHIL NFR BLD AUTO: 3.2 %
ERYTHROCYTE [DISTWIDTH] IN BLOOD BY AUTOMATED COUNT: 13 % (ref 11–15)
EST. AVERAGE GLUCOSE BLD GHB EST-MCNC: 114 MG/DL (ref 68–126)
FASTING PATIENT LIPID ANSWER: YES
FASTING STATUS PATIENT QL REPORTED: YES
GLOBULIN PLAS-MCNC: 2.9 G/DL (ref 2–3.5)
GLUCOSE BLD-MCNC: 85 MG/DL (ref 70–99)
HBA1C MFR BLD: 5.6 % (ref ?–5.7)
HCT VFR BLD AUTO: 49.7 %
HDLC SERPL-MCNC: 64 MG/DL (ref 40–59)
HGB BLD-MCNC: 16.6 G/DL
IMM GRANULOCYTES # BLD AUTO: 0.01 X10(3) UL (ref 0–1)
IMM GRANULOCYTES NFR BLD: 0.2 %
LDLC SERPL CALC-MCNC: 159 MG/DL (ref ?–100)
LYMPHOCYTES # BLD AUTO: 2.96 X10(3) UL (ref 1–4)
LYMPHOCYTES NFR BLD AUTO: 44.8 %
MCH RBC QN AUTO: 28.2 PG (ref 26–34)
MCHC RBC AUTO-ENTMCNC: 33.4 G/DL (ref 31–37)
MCV RBC AUTO: 84.5 FL
MICROALBUMIN UR-MCNC: <0.3 MG/DL
MONOCYTES # BLD AUTO: 0.46 X10(3) UL (ref 0.1–1)
MONOCYTES NFR BLD AUTO: 7 %
NEUTROPHILS # BLD AUTO: 2.93 X10 (3) UL (ref 1.5–7.7)
NEUTROPHILS # BLD AUTO: 2.93 X10(3) UL (ref 1.5–7.7)
NEUTROPHILS NFR BLD AUTO: 44.2 %
NONHDLC SERPL-MCNC: 187 MG/DL (ref ?–130)
OSMOLALITY SERPL CALC.SUM OF ELEC: 295 MOSM/KG (ref 275–295)
PLATELET # BLD AUTO: 332 10(3)UL (ref 150–450)
POTASSIUM SERPL-SCNC: 4.4 MMOL/L (ref 3.5–5.1)
PROT SERPL-MCNC: 7.8 G/DL (ref 5.7–8.2)
RBC # BLD AUTO: 5.88 X10(6)UL
SODIUM SERPL-SCNC: 142 MMOL/L (ref 136–145)
TRIGL SERPL-MCNC: 154 MG/DL (ref 30–149)
TSI SER-ACNC: 0.71 MIU/ML (ref 0.55–4.78)
VIT B12 SERPL-MCNC: 474 PG/ML (ref 211–911)
VIT D+METAB SERPL-MCNC: 42.8 NG/ML (ref 30–100)
VLDLC SERPL CALC-MCNC: 30 MG/DL (ref 0–30)
WBC # BLD AUTO: 6.6 X10(3) UL (ref 4–11)

## 2024-10-10 PROCEDURE — 36415 COLL VENOUS BLD VENIPUNCTURE: CPT

## 2024-10-10 PROCEDURE — 82570 ASSAY OF URINE CREATININE: CPT

## 2024-10-10 PROCEDURE — 84443 ASSAY THYROID STIM HORMONE: CPT

## 2024-10-10 PROCEDURE — 82306 VITAMIN D 25 HYDROXY: CPT

## 2024-10-10 PROCEDURE — 82607 VITAMIN B-12: CPT

## 2024-10-10 PROCEDURE — 83036 HEMOGLOBIN GLYCOSYLATED A1C: CPT

## 2024-10-10 PROCEDURE — 85060 BLOOD SMEAR INTERPRETATION: CPT

## 2024-10-10 PROCEDURE — 80061 LIPID PANEL: CPT

## 2024-10-10 PROCEDURE — 85025 COMPLETE CBC W/AUTO DIFF WBC: CPT

## 2024-10-10 PROCEDURE — 80053 COMPREHEN METABOLIC PANEL: CPT

## 2024-10-10 PROCEDURE — 82043 UR ALBUMIN QUANTITATIVE: CPT

## 2024-10-10 NOTE — PROGRESS NOTES
HPI:   Deborah Sanders is a 59 year old female who presents for a complete physical exam.    Reports feeling good. Going to weight loss clinic on mounjaro 7.5mg. Has been exercising - working on strength. Was also given phentermine but has side effect of hair loss and depression. Report some nausea when not eating regularly.     Wt Readings from Last 3 Encounters:   10/10/24 158 lb 3.2 oz (71.8 kg)   07/24/24 156 lb (70.8 kg)   06/10/24 168 lb (76.2 kg)     Body mass index is 26.33 kg/m².       Current Outpatient Medications   Medication Sig Dispense Refill    lisdexamfetamine (VYVANSE) 30 MG Oral Cap Take 1 capsule (30 mg total) by mouth daily. 30 capsule 0    MOUNJARO 7.5 MG/0.5ML Subcutaneous Solution Pen-injector Inject 7.5 mg into the skin once a week. 2 mL 0    Empagliflozin (JARDIANCE) 25 MG Oral Tab Take 1 tablet by mouth daily. 90 tablet 0    Cholecalciferol (VITAMIN D3) 1.25 MG (97174 UT) Oral Cap Take 1 capsule by mouth once a week. 12 capsule 3    fluticasone propionate 50 MCG/ACT Nasal Suspension 2 sprays by Nasal route daily. 48 g 3    levocetirizine 5 MG Oral Tab Take 1 tablet (5 mg total) by mouth every evening. 90 tablet 1    Estradiol (ESTRACE) 0.1 MG/GM Vaginal Cream Apply 1/2 gram vaginally 2 times per week. 1 Tube 3    [START ON 10/11/2024] lisdexamfetamine (VYVANSE) 30 MG Oral Cap Take 1 capsule (30 mg total) by mouth daily. (Patient not taking: Reported on 10/10/2024) 30 capsule 0    [START ON 11/11/2024] lisdexamfetamine (VYVANSE) 30 MG Oral Cap Take 1 capsule (30 mg total) by mouth daily. (Patient not taking: Reported on 10/10/2024) 30 capsule 0    nitrofurantoin monohydrate macro (MACROBID) 100 MG Oral Cap Take 1 capsule (100 mg total) by mouth 2 (two) times daily. 14 capsule 0    Tirzepatide (MOUNJARO) 2.5 MG/0.5ML Subcutaneous Solution Pen-injector Inject 2.5 mg into the skin once a week. (Patient not taking: Reported on 10/10/2024) 2 mL 0    Phentermine HCl 15 MG Oral Cap  (Patient not  taking: Reported on 10/10/2024)      cyclobenzaprine 10 MG Oral Tab Take 1 tablet (10 mg total) by mouth nightly as needed. (Patient not taking: Reported on 2024) 30 tablet 0    TRI-MALIKA 0.01-4-0.05 % External Cream Apply 1 Application topically 2 (two) times daily. (Patient not taking: Reported on 6/10/2024)        Past Medical History:    Allergic rhinitis    Anesthesia complication    Biliary calculus    Diabetes (HCC)    Difficult intubation    Difficulty opening mouth wide during general anesthesia    Esophageal reflux    Hyperlipidemia    Lipid screening    per NG    Other ill-defined conditions(799.89)    per [employer]:  \"Abnormal pap smear; Management:  colposcopy x3\"    Sleep apnea    doest use mask    Type 2 diabetes mellitus without retinopathy (HCC)    Vitamin D deficiency      Past Surgical History:   Procedure Laterality Date    Cholecystectomy      Colonoscopy      Colonoscopy N/A 2018    few diverticula    Egd  2018    small HH, H Pylori gastritis    Egd      Ercp duct stent placement  2014    bile leak    Hysterectomy      partial hystero.    Other surgical history      Bladder surgery      Family History   Problem Relation Age of Onset    Cancer Mother         Uterus Cancer; age 40 cause of death    Diabetes Father     Cancer Father         Lung CA    Breast Cancer Sister 49    Glaucoma Neg     Macular degeneration Neg       Social History:   Social History     Socioeconomic History    Marital status:    Tobacco Use    Smoking status: Former     Current packs/day: 0.00     Types: Cigarettes     Start date: 1999     Quit date: 2012     Years since quittin.2    Smokeless tobacco: Never   Vaping Use    Vaping status: Never Used   Substance and Sexual Activity    Alcohol use: Not Currently     Comment: socially    Drug use: Never    Sexual activity: Yes     Partners: Male     Birth control/protection: Hysterectomy   Other Topics Concern     Caffeine Concern Yes     Comment: Coffee: 1 cup daily    Exercise Yes     Comment: Walking    Seat Belt Yes    Pt has a pacemaker No    Pt has a defibrillator No    Reaction to local anesthetic No   Social History Narrative    The patient does not use an assistive device..      The patient does not live in a home with stairs.     Social Drivers of Health     Financial Resource Strain: Low Risk  (9/30/2021)    [facility], [facility]    Overall Financial Resource Strain (CARDIA)     Difficulty of Paying Living Expenses: Not hard at all   Transportation Needs: No Transportation Needs (9/30/2021)    [facility] from [facility], [facility]    PRAPARE - Transportation     Lack of Transportation (Medical): No     Lack of Transportation (Non-Medical): No    [facility]    Housing Stability          REVIEW OF SYSTEMS:   GENERAL: feels well otherwise  Review of Systems   See HPI   EXAM:   /77   Pulse 88   Ht 5' 5\" (1.651 m)   Wt 158 lb 3.2 oz (71.8 kg)   BMI 26.33 kg/m²     GENERAL: well developed, well nourished,in no apparent distress  SKIN: no rashes,no suspicious lesions - left hip soft tissue mass   HEENT: atraumatic, normocephalic,ears and throat are clear  EYES:PERRLA, EOMI, conjunctiva are clear  LUNGS: clear to auscultation  CARDIO: RRR without murmur  GI: good BS's,no masses, HSM or tenderness  EXTREMITIES: no cyanosis, clubbing or edema  NEURO: Oriented times three,cranial nerves are intact,motor and sensory are grossly intact  Bilateral barefoot skin diabetic exam is normal, visualized feet and the appearance is normal.  Bilateral monofilament/sensation of both feet is normal.  Pulsation pedal pulse exam of both lower legs/feet is normal as well.     ASSESSMENT AND PLAN:   Deborah Sanders is a 59 year old female who presents for a complete physical exam.  1. Type 2 diabetes mellitus  without retinopathy (HCC)    - CBC With Differential With Platelet; Future  - Comp Metabolic Panel (14); Future  - Lipid Panel; Future  - TSH W Reflex To Free T4; Future  - Vitamin B12; Future  - Vitamin D; Future  - Hemoglobin A1C; Future  - Microalb/Creat Ratio, Random Urine; Future    2. Routine medical exam    - CBC With Differential With Platelet; Future  - Comp Metabolic Panel (14); Future  - Lipid Panel; Future  - TSH W Reflex To Free T4; Future  - Vitamin B12; Future  - Vitamin D; Future  - Hemoglobin A1C; Future  - Microalb/Creat Ratio, Random Urine; Future    3. Vitamin D deficiency    - Vitamin D; Future    4. Soft tissue mass    - US BUTTOCK SOFT TISSUE(CPT=76857); Future         Brittanie Preciado MD  10/10/2024  9:12 AM

## 2024-10-11 NOTE — TELEPHONE ENCOUNTER
Requesting   Requested Prescriptions     Pending Prescriptions Disp Refills    MOUNJARO 7.5 MG/0.5ML Subcutaneous Solution Pen-injector 2 mL 0     Sig: Inject 7.5 mg into the skin once a week.      LOV: 09/10/24 tele  RTC:   Last Relevant Labs:   Filled: 09/08/24 #2ml with 0 refills    Future Appointments   Date Time Provider Department Center   10/23/2024  2:30 PM OS [facility] [place]   11/12/2024  1:30 PM Aishwarya Alas PA [facility]   12/30/2024  7:20 AM Joan Leigh PA-C [id number] [address]   12/30/2024  9:00 AM Kwabena Strauss DPM [id number]

## 2024-10-15 ENCOUNTER — PATIENT MESSAGE (OUTPATIENT)
Dept: FAMILY MEDICINE CLINIC | Facility: CLINIC | Age: 59
End: 2024-10-15

## 2024-10-15 DIAGNOSIS — G47.33 OBSTRUCTIVE SLEEP APNEA: ICD-10-CM

## 2024-10-15 DIAGNOSIS — E78.5 HYPERLIPIDEMIA, UNSPECIFIED HYPERLIPIDEMIA TYPE: Primary | ICD-10-CM

## 2024-10-16 ENCOUNTER — TELEPHONE (OUTPATIENT)
Dept: FAMILY MEDICINE CLINIC | Facility: CLINIC | Age: 59
End: 2024-10-16

## 2024-10-16 NOTE — TELEPHONE ENCOUNTER
Please see patient E-mail and advise.        True Cabrera - Your cholesterol is much higher again. You used to be on atorvastatin. Did you have reaction to it? If not, I recommend you restart it. Also your hemoglobin level is high - this can happen with smoking and/or sleep apnea. Are you smoking? - Dr. Preciado   Written by Brittanie Preciado MD on 10/15/2024  2:30 PM CDT  Seen by patient Deborah Sanders on 10/16/2024  7:39 AM

## 2024-10-16 NOTE — TELEPHONE ENCOUNTER
Deborah Preciado MD53 minutes ago (12:32 PM)       Thank You MD Deborah Steen56 minutes ago (12:28 PM)       Ok I placed an order a new machine through [facility]        Deborah Preciado MD1 hour ago (12:23 PM)          Yes, I was using the sleeping machine but a year ago [facility] sent me an email that we had to return it. was a re-call   so I haven't use one any more        MD Deborah Vaughan1 hour ago (12:14 PM)       Ok please restart the 10 mg and repeat labs in 3 months. In 2018, you had a sleep study that showed severe sleep apnea and recommended CPAP machine. Are you using one nightly?

## 2024-10-23 ENCOUNTER — HOSPITAL ENCOUNTER (OUTPATIENT)
Dept: ULTRASOUND IMAGING | Age: 59
Discharge: HOME | End: 2024-10-23
Attending: FAMILY MEDICINE
Payer: COMMERCIAL

## 2024-10-23 DIAGNOSIS — M79.89 SOFT TISSUE MASS: ICD-10-CM

## 2024-10-23 PROCEDURE — 76857 US EXAM PELVIC LIMITED: CPT | Performed by: FAMILY MEDICINE

## 2024-11-06 ENCOUNTER — PATIENT MESSAGE (OUTPATIENT)
Facility: CLINIC | Age: 59
End: 2024-11-06

## 2024-11-06 DIAGNOSIS — E11.9 TYPE 2 DIABETES MELLITUS WITHOUT RETINOPATHY (HCC): Primary | ICD-10-CM

## 2024-11-07 NOTE — TELEPHONE ENCOUNTER
Requesting Mounjaro  LOV: 9/10/24  RTC: not noted  Last Relevant Labs: 10/10/24  Filled: 10/14/24 #2ml with 0 refills  10 mg      12/30/2024  7:20 AM Joan Leigh PA-C [id number] [address]

## 2024-11-11 RX ORDER — TIRZEPATIDE 10 MG/.5ML
10 INJECTION, SOLUTION SUBCUTANEOUS WEEKLY
Qty: 2 ML | Refills: 2 | Status: SHIPPED | OUTPATIENT
Start: 2024-11-11

## 2024-11-12 ENCOUNTER — VIRTUAL PHONE E/M (OUTPATIENT)
Dept: UROLOGY | Facility: HOSPITAL | Age: 59
End: 2024-11-12
Attending: PHYSICIAN ASSISTANT
Payer: COMMERCIAL

## 2024-11-12 DIAGNOSIS — Z91.199 NO-SHOW FOR APPOINTMENT: Primary | ICD-10-CM

## 2024-11-22 ENCOUNTER — HOSPITAL ENCOUNTER (OUTPATIENT)
Dept: GENERAL RADIOLOGY | Facility: HOSPITAL | Age: 59
Discharge: HOME | End: 2024-11-22
Attending: STUDENT IN AN ORGANIZED HEALTH CARE EDUCATION/TRAINING PROGRAM
Payer: COMMERCIAL

## 2024-11-22 ENCOUNTER — OFFICE VISIT (OUTPATIENT)
Dept: ORTHOPEDICS CLINIC | Facility: CLINIC | Age: 59
End: 2024-11-22

## 2024-11-22 DIAGNOSIS — M70.62 GREATER TROCHANTERIC BURSITIS OF LEFT HIP: ICD-10-CM

## 2024-11-22 DIAGNOSIS — M25.559 HIP PAIN, UNSPECIFIED LATERALITY: Primary | ICD-10-CM

## 2024-11-22 DIAGNOSIS — M25.559 HIP PAIN, UNSPECIFIED LATERALITY: ICD-10-CM

## 2024-11-22 PROCEDURE — 73502 X-RAY EXAM HIP UNI 2-3 VIEWS: CPT | Performed by: STUDENT IN AN ORGANIZED HEALTH CARE EDUCATION/TRAINING PROGRAM

## 2024-11-22 RX ORDER — MELOXICAM 15 MG/1
15 TABLET ORAL DAILY
Qty: 30 TABLET | Refills: 2 | Status: SHIPPED | OUTPATIENT
Start: 2024-11-22

## 2024-11-25 PROBLEM — M70.62 GREATER TROCHANTERIC BURSITIS OF LEFT HIP: Status: ACTIVE | Noted: 2024-11-25

## 2024-11-25 NOTE — H&P
Orthopaedic Surgery New Patient Visit  _____________________________________________________________________________________________________  _____________________________________________________________________________________________________    DATE OF VISIT: 2024     CHIEF COMPLAINT:   Chief Complaint   Patient presents with    Hip Pain     New pt- L hip - onset- pet pt she felt a bump on her hip 6 mos ago- denies injury- rates pain 5/10 w/ prolonged walking- has intermittent numbness and tingling- stated has US in the system        HISTORY OF PRESENT ILLNESS: Deborah Sanders is a 59 year old female who presents to the clinic for evaluation of her left hip.  She reports a multiple week history of left hip pain primarily on the lateral aspect of her hip.  This is made worse with prolonged activity and walking.  She reports that his lateral and posterior.  She denies any notable groin pain or knee pain.  She denies any major trauma though did feel a bump on her hip in the region of her pain but developed insidiously.  She has intermittent numbness and tingling in her lower extremity distal to the site of the pain.  Pain is improved with rest.    SOCIAL HISTORY  Social History     Socioeconomic History    Marital status:      Spouse name: Not on file    Number of children: Not on file    Years of education: Not on file    Highest education level: Not on file   Occupational History    Not on file   Tobacco Use    Smoking status: Former     Current packs/day: 0.00     Types: Cigarettes     Start date: 1999     Quit date: 2012     Years since quittin.3    Smokeless tobacco: Never   Vaping Use    Vaping status: Never Used   Substance and Sexual Activity    Alcohol use: Not Currently     Comment: socially    Drug use: Never    Sexual activity: Yes     Partners: Male     Birth control/protection: Hysterectomy   Other Topics Concern     Service Not Asked    Blood Transfusions Not Asked     Caffeine Concern Yes     Comment: Coffee: 1 cup daily    Occupational Exposure Not Asked    Hobby Hazards Not Asked    Sleep Concern Not Asked    Stress Concern Not Asked    Weight Concern Not Asked    Special Diet Not Asked    Back Care Not Asked    Exercise Yes     Comment: Walking    Bike Helmet Not Asked    Seat Belt Yes    Self-Exams Not Asked    Grew up on a farm Not Asked    History of tanning Not Asked    Outdoor occupation Not Asked    Pt has a pacemaker No    Pt has a defibrillator No    Breast feeding Not Asked    Reaction to local anesthetic No   Social History Narrative    The patient does not use an assistive device..      The patient does not live in a home with stairs.     Social Drivers of Health     Financial Resource Strain: Low Risk  (9/30/2021)    [facility]    Overall Financial Resource Strain (CARDIA)     Difficulty of Paying Living Expenses: Not hard at all   Food Insecurity: Not on file   Transportation Needs: No Transportation Needs (9/30/2021)    [facility]    PRAPARE - Transportation     Lack of Transportation (Medical): No     Lack of Transportation (Non-Medical): No   Physical Activity: Not on file   Stress: Not on file   Social Connections: Not on file   Housing Stability: Low Risk  (11/29/2022)    Received [facility], [facility]    Housing Stability     Mortgage Payment Concerns?: Not on file     Number of Places Lived in the Last Year: Not on file     Unstable Housing?: Not on file        PAST MEDICAL HISTORY  Past Medical History:    Allergic rhinitis    Anesthesia complication    Biliary calculus    Diabetes (HCC)    Difficult intubation    Difficulty opening mouth wide during general anesthesia    Esophageal reflux    Hyperlipidemia    Lipid screening    per NG    Other ill-defined conditions(429.89)    per [employer]:  \"Abnormal pap smear;  Management:  colposcopy x3\"    Sleep apnea    doest use mask    Type 2 diabetes mellitus without retinopathy (HCC)    Vitamin D deficiency        PAST SURGICAL HISTORY  Past Surgical History:   Procedure Laterality Date    Cholecystectomy  2014    Colonoscopy  2014    Colonoscopy N/A 06/12/2018    few diverticula    Egd  06/2018    small HH, H Pylori gastritis    Egd  2014    Ercp duct stent placement  03/2014    bile leak    Hysterectomy  1992    partial hystero.    Other surgical history  2008    Bladder surgery        MEDICATIONS  * Reviewed   Meloxicam 15 MG Oral Tab Take 1 tablet (15 mg total) by mouth daily. 30 tablet 2        ALLERGIES  Allergies[1]     FAMILY HISTORY  Family History   Problem Relation Age of Onset    Cancer Mother         Uterus Cancer; age 40 cause of death    Diabetes Father     Cancer Father         Lung CA    Breast Cancer Sister 49    Glaucoma Neg     Macular degeneration Neg         REVIEW OF SYSTEMS  A 14 point review of systems was performed. Pertinent positives and negatives noted in the HPI.    PHYSICAL EXAM  There were no vitals taken for this visit.     Constitutional: The patient is well-developed, well-nourished, in no acute distress.  Neurological: Alert and oriented to person, place, and time.  Psychiatric: Mood and affect normal.  Head: Normocephalic and atraumatic.  Cardiovascular: regular rate by palpation  Pulmonary/Chest: Effort normal. No respiratory distress. Breathing non-labored  Abdominal: Abdomen exhibits no distension.   Left  Hip  GAIT  Normal  Gait speed normal for age    INSPECTION/PALPATION  No readily apparent visual abnormalities of the hip   No ecchymosis, erythema, or effusion.   No breaks in skin. Skin is euthermic  TTP to greater trochanter  ROM  Flexion: 120 degrees  Extension: 0 degrees  Abduction: 50 degrees  Adduction: 15 degrees  STRENGTH  Hip Flexion: 5/5  Hip Extension: 5/5  Knee Flexion: 5/5  Knee Extension: 5/5  POSITIVE  ISRAEL [place]  Test  NEGATIVE  FADIR  Stinchfeld  Logroll  SILT Umer/Saph/SPN/DPN/T  2+ DP/PT pulse, foot wwp     RESULTS    Lab Results   Component Value Date    WBC 6.6 10/10/2024    HGB 16.6 (H) 10/10/2024    .0 10/10/2024      Lab Results   Component Value Date    GLU 85 10/10/2024    BUN 18 10/10/2024    CREATSERUM 0.84 10/10/2024    GFRNAA 100 06/02/2023    GFRAA 114 02/27/2022        IMAGING  I independently viewed and interpreted the imaging. Radiologist interpretation is available in the imaging report.  X-ray: Plain films of the left hip including AP and lateral were obtained and reviewed, demonstrating no acute osseous abnormalities.  There are mild degenerative changes in the hip joint.  No fracture appreciated     XR HIP W OR WO PELVIS 2 OR 3 VIEWS, LEFT (CPT=73502)    Result Date: 11/22/2024  PROCEDURE: XR HIP W OR WO PELVIS 2 OR 3 VIEWS, LEFT (CPT=73502)  COMPARISON: None.  INDICATIONS: Deep left hip pain for 4-6 months; no known trauma.  TECHNIQUE: Three-view Findings and impression:  Normal alignment  No fracture  No significant joint space narrowing.  Small spur from the lateral roof of the acetabulum.  No AVN  Mild DJD of the pubic symphysis Finalized by (CST): Jose D Britt MD on 11/22/2024 at 3:37 PM             ASSESSMENT/PLAN: Deborah Sanders is a 59 year old female who presents to the Orthopaedic surgery clinic today for left hip greater trochanteric bursitis..  We discussed greater trochanteric bursitis including the typical causes, natural history, and treatment options.  We discussed that this can be a self-limiting process exacerbated by activity.  We discussed that management options could include physical therapy, anti-inflammatories, dry needling, steroid injections, operative intervention, and cryotherapy.  After discussion of all of these options including risks, benefits, and alternatives, she opted to proceed with therapy and anti-inflammatories    Discussed the history, physical exam,  treatment to date, and reviewed relevant imaging an studies with the patient.  WEIGHT BEARING STATUS: Weightbearing as tolerated  RANGE-OF-MOTION LIMITATIONS: as tolerated  NEW PRESCRIPTIONS:  We discussed medications for this condition including patient current regimen. Based on this discussion we have added/re-ordered meloxicam  IMAGING ORDERED: none  CONSULTS PLACED: We discussed the role of therapy and/or additional specialty evaluation/intervention for this condition including previous/ongoing therapy and specialist services. Based on this discussion we have consulted physical therapy  PROSTHESES/ORTHOTICS: the patient does not need prostheses/orthoses for the current condition  PROCEDURES: none    FOLLOW-UP: after trial of therapy    RADIOGRAPHS AT NEXT VISIT: none    I have personally seen Deborah Sanders and discussed in detail their plan of care. Prior to departure, they indicated agreement with and understanding of their plan of care and their follow-up as documented herein this note. Please note that this note was written in combination with voice recognition/dictation software and there is a possibility of transcription errors which were not identified at the time of note submission. If clarification is necessary, please contact the author or clinic staff.    Nik Peña MD  Orthopaedic Surgery  11/25/2024         [1]   Allergies  Allergen Reactions    Clindamycin RASH

## 2024-11-30 DIAGNOSIS — E11.9 TYPE 2 DIABETES MELLITUS WITHOUT RETINOPATHY (HCC): ICD-10-CM

## 2024-11-30 RX ORDER — TIRZEPATIDE 10 MG/.5ML
10 INJECTION, SOLUTION SUBCUTANEOUS WEEKLY
Qty: 2 ML | Refills: 2 | OUTPATIENT
Start: 2024-11-30

## 2024-12-30 ENCOUNTER — OFFICE VISIT (OUTPATIENT)
Facility: CLINIC | Age: 59
End: 2024-12-30
Payer: COMMERCIAL

## 2024-12-30 VITALS
HEIGHT: 65 IN | OXYGEN SATURATION: 100 % | DIASTOLIC BLOOD PRESSURE: 60 MMHG | WEIGHT: 156 LBS | BODY MASS INDEX: 25.99 KG/M2 | HEART RATE: 96 BPM | SYSTOLIC BLOOD PRESSURE: 110 MMHG | RESPIRATION RATE: 18 BRPM

## 2024-12-30 DIAGNOSIS — E78.00 HYPERCHOLESTEROLEMIA: ICD-10-CM

## 2024-12-30 DIAGNOSIS — E11.9 TYPE 2 DIABETES MELLITUS WITHOUT RETINOPATHY (HCC): ICD-10-CM

## 2024-12-30 DIAGNOSIS — E66.3 OVERWEIGHT (BMI 25.0-29.9): ICD-10-CM

## 2024-12-30 DIAGNOSIS — G47.33 OSA ON CPAP: ICD-10-CM

## 2024-12-30 DIAGNOSIS — Z51.81 ENCOUNTER FOR THERAPEUTIC DRUG LEVEL MONITORING: Primary | ICD-10-CM

## 2024-12-30 PROCEDURE — 3078F DIAST BP <80 MM HG: CPT | Performed by: PHYSICIAN ASSISTANT

## 2024-12-30 PROCEDURE — 3008F BODY MASS INDEX DOCD: CPT | Performed by: PHYSICIAN ASSISTANT

## 2024-12-30 PROCEDURE — 3074F SYST BP LT 130 MM HG: CPT | Performed by: PHYSICIAN ASSISTANT

## 2024-12-30 PROCEDURE — 99214 OFFICE O/P EST MOD 30 MIN: CPT | Performed by: PHYSICIAN ASSISTANT

## 2024-12-30 RX ORDER — TIRZEPATIDE 12.5 MG/.5ML
12.5 INJECTION, SOLUTION SUBCUTANEOUS WEEKLY
Qty: 2 ML | Refills: 0 | Status: SHIPPED | OUTPATIENT
Start: 2024-12-30

## 2024-12-30 RX ORDER — ATORVASTATIN CALCIUM 10 MG/1
10 TABLET, FILM COATED ORAL NIGHTLY
COMMUNITY
Start: 2024-07-01

## 2024-12-30 NOTE — PROGRESS NOTES
HISTORY OF PRESENT ILLNESS  Chief Complaint   Patient presents with    Weight Check     +1lb       Deborah Sanders is a 59 year old female here for follow up in medical weight loss program.   +1lb  Compliant on mounjaro  Denies chest pain, shortness of breath, dizziness, blurred vision, headache, paresthesia, nausea/vomiting.   Last OV switched to vyvanse, saw hair loss, when she stopped it hair loss stopped  Not craving sweets  Has been trying prioritize protein    Exercise/Activity: walking  Nutrition: 24 hour food log reviewed, eating regular meals, +protein  Stress: better, more manageable  Sleep:  6-7 hours/night     [facility] Follow Up    General Information  Nutrition Recall  Exercise     Sleep              Wt Readings from Last 6 Encounters:   12/30/24 156 lb (70.8 kg)   10/10/24 158 lb 3.2 oz (71.8 kg)   07/24/24 156 lb (70.8 kg)   06/10/24 168 lb (76.2 kg)   06/06/24 170 lb (77.1 kg)   05/30/24 165 lb (74.8 kg)            Breakfast Lunch Dinner Snacks Fluids   Reviewed           REVIEW OF SYSTEMS  GENERAL HEALTH: feels well otherwise, denied any fevers chills or night sweats   RESPIRATORY: denies shortness of breath   CARDIOVASCULAR: denies chest pain  GI: denies abdominal pain    EXAM  /60   Pulse 96   Resp 18   Ht 5' 5\" (1.651 m)   Wt 156 lb (70.8 kg)   SpO2 100%   BMI 25.96 kg/m²   GENERAL: well developed, well nourished,in no apparent distress, A/O x3  SKIN: no rashes,no suspicious lesions  HEENT: atraumatic, normocephalic, OP-clear, PERRL  NECK: supple,no adenopathy  LUNGS: clear to auscultation bilaterally   CARDIO: RRR without murmur  GI: good BS's,NT/ND, no masses or HSM  EXTREMITIES: no cyanosis, no clubbing, no edema    Lab Results   Component Value Date    WBC 6.6 10/10/2024    RBC 5.88 (H) 10/10/2024    HGB 16.6 (H) 10/10/2024    HCT 49.7 (H) 10/10/2024    MCV 84.5 10/10/2024    MCH 28.2 10/10/2024    MCHC 33.4 10/10/2024    RDW 13.0 10/10/2024    .0 10/10/2024    MPV 7.8  12/06/2018     Lab Results   Component Value Date    GLU 85 10/10/2024    BUN 18 10/10/2024    BUNCREA 21.4 (H) 10/10/2024    CREATSERUM 0.84 10/10/2024    ANIONGAP 4 10/10/2024    GFRNAA 100 06/02/2023    GFRAA 114 02/27/2022    CA 10.3 10/10/2024    OSMOCALC 295 10/10/2024    ALKPHO 122 (H) 10/10/2024    AST 17 10/10/2024    ALT 14 10/10/2024    ALKPHOS 96 07/11/2015    BILT 0.9 10/10/2024    TP 7.8 10/10/2024    ALB 4.9 (H) 10/10/2024    GLOBULIN 2.9 10/10/2024    AGRATIO 1.1 07/11/2015     10/10/2024    K 4.4 10/10/2024     10/10/2024    CO2 31.0 10/10/2024     Lab Results   Component Value Date     10/10/2024    A1C 5.6 10/10/2024     Lab Results   Component Value Date    CHOLEST 251 (H) 10/10/2024    TRIG 154 (H) 10/10/2024    HDL 64 (H) 10/10/2024     (H) 10/10/2024    VLDL 30 10/10/2024    NONHDLC 187 (H) 10/10/2024    CALCNONHDL 163 (H) 07/11/2015     Lab Results   Component Value Date    T4F 1.1 11/07/2020    TSH 0.715 10/10/2024     Lab Results   Component Value Date    B12 474 10/10/2024    VITB12 496 07/11/2015     Lab Results   Component Value Date    VITD 42.8 10/10/2024    GMXZ67FR 24.7 07/11/2015       Medications Ordered Prior to Encounter[1]    ASSESSMENT  Analyzed weight data:       Diagnoses and all orders for this visit:    Encounter for therapeutic drug level monitoring  -     Tirzepatide (MOUNJARO) 12.5 MG/0.5ML Subcutaneous Solution Auto-injector; Inject 12.5 mg into the skin once a week.    Overweight (BMI 25.0-29.9)  -     Tirzepatide (MOUNJARO) 12.5 MG/0.5ML Subcutaneous Solution Auto-injector; Inject 12.5 mg into the skin once a week.    Type 2 diabetes mellitus without retinopathy (HCC)  -     Tirzepatide (MOUNJARO) 12.5 MG/0.5ML Subcutaneous Solution Auto-injector; Inject 12.5 mg into the skin once a week.    Hypercholesterolemia  -     Tirzepatide (MOUNJARO) 12.5 MG/0.5ML Subcutaneous Solution Auto-injector; Inject 12.5 mg into the skin once a week.    BASILIA on  CPAP  -     Tirzepatide (MOUNJARO) 12.5 MG/0.5ML Subcutaneous Solution Auto-injector; Inject 12.5 mg into the skin once a week.        PLAN  Initial Weight: 204lbs  Initial Weight Date: 12/2/22  Today's Weight: 156lbs  5% Goal: 10.2lbs  10% Goal: 20.4lbs  Total Weight Loss: +1lbs/Net loss 48lbs    Will continue and increase mounjaro - advised side effects and adverse effects of medication   DM - stable, continue current medications, low carb diet  HLD - stable on current medication atorvastatin, will continue, will continue to work on lifestyle modifications  CPAP compliance  Consistency with logging foods - protein and produce  Incorporate strength/resistance training  Nutrition: low carb diet/ recommended to eat breakfast daily/ regular protein intake  Medication use and side effects reviewed with patient.  Medication contraindications: contrave, vyvanse  Follow up with dietitian and psychologist as recommended.  Discussed the role of sleep and stress in weight management.  Counseled on comprehensive weight loss plan including attention to nutrition, exercise and behavior/stress management for success. See patient instruction below for more details.  Discussed strategies to overcome barriers to successful weight loss and weight maintenance  FITTE: ACSM recommendations (150-300 minutes/ week in active weight loss)   Weight Loss consent to treat reviewed and signed       NOTE TO PATIENT: The 21st Century Cures Act makes clinical notes like these available to patients in the interest of transparency. Clinical notes are medical documents used by physicians and care providers to communicate with each other. These documents include medical language and terminology, abbreviations, and treatment information that may sound technical and at times possibly unfamiliar. In addition, at times, the verbiage may appear blunt or direct. These documents are one tool providers use to communicate relevant information and clinical  opinions of the care providers in a way that allows common understanding of the clinical context.     There are no Patient Instructions on file for this visit.    No follow-ups on file.    Patient verbalizes understanding.    Joan Leigh PA-C  12/30/2024           [1]   Current Outpatient Medications on File Prior to Visit   Medication Sig Dispense Refill    atorvastatin 10 MG Oral Tab Take 1 tablet (10 mg total) by mouth nightly.      Meloxicam 15 MG Oral Tab Take 1 tablet (15 mg total) by mouth daily. 30 tablet 2    Tirzepatide (MOUNJARO) 10 MG/0.5ML Subcutaneous Solution Auto-injector Inject 10 mg into the skin once a week. 2 mL 2    Empagliflozin (JARDIANCE) 25 MG Oral Tab Take 1 tablet by mouth daily. 90 tablet 0    Cholecalciferol (VITAMIN D3) 1.25 MG (55805 UT) Oral Cap Take 1 capsule by mouth once a week. 12 capsule 3    fluticasone propionate 50 MCG/ACT Nasal Suspension 2 sprays by Nasal route daily. 48 g 3    levocetirizine 5 MG Oral Tab Take 1 tablet (5 mg total) by mouth every evening. 90 tablet 1    Estradiol (ESTRACE) 0.1 MG/GM Vaginal Cream Apply 1/2 gram vaginally 2 times per week. 1 Tube 3    cyclobenzaprine 10 MG Oral Tab Take 1 tablet (10 mg total) by mouth nightly as needed. (Patient not taking: Reported on 12/30/2024) 30 tablet 0    TRI-MALIKA 0.01-4-0.05 % External Cream Apply 1 Application topically 2 (two) times daily. (Patient not taking: Reported on 12/30/2024)       No current facility-administered medications on file prior to visit.

## 2025-02-10 ENCOUNTER — PATIENT MESSAGE (OUTPATIENT)
Facility: CLINIC | Age: 60
End: 2025-02-10

## 2025-02-10 DIAGNOSIS — E66.3 OVERWEIGHT (BMI 25.0-29.9): ICD-10-CM

## 2025-02-10 DIAGNOSIS — G47.33 OSA ON CPAP: ICD-10-CM

## 2025-02-10 DIAGNOSIS — E11.9 TYPE 2 DIABETES MELLITUS WITHOUT RETINOPATHY (HCC): ICD-10-CM

## 2025-02-10 DIAGNOSIS — E78.00 HYPERCHOLESTEROLEMIA: ICD-10-CM

## 2025-02-10 DIAGNOSIS — Z51.81 ENCOUNTER FOR THERAPEUTIC DRUG LEVEL MONITORING: ICD-10-CM

## 2025-02-11 RX ORDER — TIRZEPATIDE 12.5 MG/.5ML
12.5 INJECTION, SOLUTION SUBCUTANEOUS WEEKLY
Qty: 2 ML | Refills: 2 | Status: SHIPPED | OUTPATIENT
Start: 2025-02-11

## 2025-02-11 NOTE — TELEPHONE ENCOUNTER
Requesting Mounjaro 12.5 mg  LOV: 12/30/24  RTC: not noted  Last Relevant Labs:   Filled: 12/30/24 #2 ml with 0 refills  12.5 mg    Future Appointments   Date Time Provider Department Center   5/2/2025 10:40 AM Joan Leigh PA-C [id number] [address]

## 2025-03-01 DIAGNOSIS — E11.9 TYPE 2 DIABETES MELLITUS WITHOUT RETINOPATHY (HCC): ICD-10-CM

## 2025-03-01 DIAGNOSIS — E78.00 HYPERCHOLESTEROLEMIA: ICD-10-CM

## 2025-03-01 DIAGNOSIS — Z51.81 ENCOUNTER FOR THERAPEUTIC DRUG LEVEL MONITORING: ICD-10-CM

## 2025-03-01 DIAGNOSIS — E66.3 OVERWEIGHT (BMI 25.0-29.9): ICD-10-CM

## 2025-03-01 DIAGNOSIS — G47.33 OSA ON CPAP: ICD-10-CM

## 2025-03-01 RX ORDER — TIRZEPATIDE 12.5 MG/.5ML
12.5 INJECTION, SOLUTION SUBCUTANEOUS WEEKLY
Qty: 2 ML | Refills: 2 | Status: CANCELLED | OUTPATIENT
Start: 2025-03-01

## 2025-03-03 NOTE — TELEPHONE ENCOUNTER
Requesting increase     Requesting   Requested Prescriptions     Pending Prescriptions Disp Refills    Tirzepatide (MOUNJARO) 12.5 MG/0.5ML Subcutaneous Solution Auto-injector 2 mL 2     Sig: Inject 12.5 mg into the skin once a week.      LOV: 12/30/24  RTC: 3-6mo  Last Relevant Labs:   Filled: 2/11/25 #2ml with 2 refills    Future Appointments   Date Time Provider Department Center   5/2/2025 10:40 AM Joan Leigh PA-C [id number] [address]

## 2025-03-05 RX ORDER — TIRZEPATIDE 15 MG/.5ML
15 INJECTION, SOLUTION SUBCUTANEOUS WEEKLY
Qty: 6 ML | Refills: 1 | Status: SHIPPED | OUTPATIENT
Start: 2025-03-05

## 2025-05-02 ENCOUNTER — LAB ENCOUNTER (OUTPATIENT)
Dept: LAB | Age: 60
End: 2025-05-02
Attending: FAMILY MEDICINE
Payer: COMMERCIAL

## 2025-05-02 ENCOUNTER — OFFICE VISIT (OUTPATIENT)
Facility: CLINIC | Age: 60
End: 2025-05-02
Payer: COMMERCIAL

## 2025-05-02 VITALS
OXYGEN SATURATION: 97 % | BODY MASS INDEX: 25.99 KG/M2 | DIASTOLIC BLOOD PRESSURE: 78 MMHG | HEART RATE: 82 BPM | SYSTOLIC BLOOD PRESSURE: 114 MMHG | RESPIRATION RATE: 16 BRPM | HEIGHT: 65 IN | WEIGHT: 156 LBS

## 2025-05-02 DIAGNOSIS — E78.5 HYPERLIPIDEMIA, UNSPECIFIED HYPERLIPIDEMIA TYPE: ICD-10-CM

## 2025-05-02 DIAGNOSIS — E78.00 HYPERCHOLESTEROLEMIA: ICD-10-CM

## 2025-05-02 DIAGNOSIS — Z51.81 ENCOUNTER FOR THERAPEUTIC DRUG LEVEL MONITORING: Primary | ICD-10-CM

## 2025-05-02 DIAGNOSIS — E66.3 OVERWEIGHT (BMI 25.0-29.9): ICD-10-CM

## 2025-05-02 DIAGNOSIS — G47.33 OSA ON CPAP: ICD-10-CM

## 2025-05-02 DIAGNOSIS — E11.9 TYPE 2 DIABETES MELLITUS WITHOUT RETINOPATHY (HCC): ICD-10-CM

## 2025-05-02 LAB
CHOLEST SERPL-MCNC: 168 MG/DL (ref ?–200)
FASTING PATIENT LIPID ANSWER: YES
HDLC SERPL-MCNC: 64 MG/DL (ref 40–59)
LDLC SERPL CALC-MCNC: 85 MG/DL (ref ?–100)
NONHDLC SERPL-MCNC: 104 MG/DL (ref ?–130)
TRIGL SERPL-MCNC: 103 MG/DL (ref 30–149)
VLDLC SERPL CALC-MCNC: 16 MG/DL (ref 0–30)

## 2025-05-02 PROCEDURE — 99214 OFFICE O/P EST MOD 30 MIN: CPT | Performed by: PHYSICIAN ASSISTANT

## 2025-05-02 PROCEDURE — 3008F BODY MASS INDEX DOCD: CPT | Performed by: PHYSICIAN ASSISTANT

## 2025-05-02 PROCEDURE — 80061 LIPID PANEL: CPT

## 2025-05-02 PROCEDURE — 3074F SYST BP LT 130 MM HG: CPT | Performed by: PHYSICIAN ASSISTANT

## 2025-05-02 PROCEDURE — 36415 COLL VENOUS BLD VENIPUNCTURE: CPT

## 2025-05-02 PROCEDURE — 3078F DIAST BP <80 MM HG: CPT | Performed by: PHYSICIAN ASSISTANT

## 2025-05-02 NOTE — PROGRESS NOTES
HISTORY OF PRESENT ILLNESS  Chief Complaint   Patient presents with    Weight Check     No changes 12/30/24 -156lbs       Deborah Sanders is a 59 year old female here for follow up in medical weight loss program.   No change  Compliant on mounjaro 15mg  Denies chest pain, shortness of breath, dizziness, blurred vision, headache, paresthesia, nausea/vomiting.   Feels very good on mounjaro, no side effects  Noticing more hunger early in the morning and in the evenings  Feels like food choices are good, trying to get more protein  Still feeling full sooner    Exercise/Activity: has fallen off, traveled to [place] a month ago and took a little to get back to routine  Nutrition: 24 hour food log reviewed, eating regular meals, +protein  Stress: manageable  Sleep: feels like it is very good    [facility] Follow Up    General Information  Nutrition Recall  Exercise     Sleep              Wt Readings from Last 6 Encounters:   05/02/25 156 lb (70.8 kg)   12/30/24 156 lb (70.8 kg)   10/10/24 158 lb 3.2 oz (71.8 kg)   07/24/24 156 lb (70.8 kg)   06/10/24 168 lb (76.2 kg)   06/06/24 170 lb (77.1 kg)            Breakfast Lunch Dinner Snacks Fluids   Reviewed           REVIEW OF SYSTEMS  GENERAL HEALTH: feels well otherwise, denied any fevers chills or night sweats   RESPIRATORY: denies shortness of breath   CARDIOVASCULAR: denies chest pain  GI: denies abdominal pain    EXAM  /78   Pulse 82   Resp 16   Ht 5' 5\" (1.651 m)   Wt 156 lb (70.8 kg)   SpO2 97%   BMI 25.96 kg/m²   GENERAL: well developed, well nourished,in no apparent distress, A/O x3  SKIN: no rashes,no suspicious lesions  HEENT: atraumatic, normocephalic, OP-clear, PERRL  NECK: supple,no adenopathy  LUNGS: clear to auscultation bilaterally   CARDIO: RRR without murmur  GI: good BS's,NT/ND, no masses or HSM  EXTREMITIES: no cyanosis, no clubbing, no edema    Lab Results   Component Value Date    WBC 6.6 10/10/2024    RBC 5.88 (H) 10/10/2024    HGB 16.6 (H)  10/10/2024    HCT 49.7 (H) 10/10/2024    MCV 84.5 10/10/2024    MCH 28.2 10/10/2024    MCHC 33.4 10/10/2024    RDW 13.0 10/10/2024    .0 10/10/2024    MPV 7.8 12/06/2018     Lab Results   Component Value Date    GLU 85 10/10/2024    BUN 18 10/10/2024    BUNCREA 21.4 (H) 10/10/2024    CREATSERUM 0.84 10/10/2024    ANIONGAP 4 10/10/2024    GFRNAA 100 06/02/2023    GFRAA 114 02/27/2022    CA 10.3 10/10/2024    OSMOCALC 295 10/10/2024    ALKPHO 122 (H) 10/10/2024    AST 17 10/10/2024    ALT 14 10/10/2024    ALKPHOS 96 07/11/2015    BILT 0.9 10/10/2024    TP 7.8 10/10/2024    ALB 4.9 (H) 10/10/2024    GLOBULIN 2.9 10/10/2024    AGRATIO 1.1 07/11/2015     10/10/2024    K 4.4 10/10/2024     10/10/2024    CO2 31.0 10/10/2024     Lab Results   Component Value Date     10/10/2024    A1C 5.6 10/10/2024     Lab Results   Component Value Date    CHOLEST 251 (H) 10/10/2024    TRIG 154 (H) 10/10/2024    HDL 64 (H) 10/10/2024     (H) 10/10/2024    VLDL 30 10/10/2024    NONHDLC 187 (H) 10/10/2024    CALCNONHDL 163 (H) 07/11/2015     Lab Results   Component Value Date    T4F 1.1 11/07/2020    TSH 0.715 10/10/2024     Lab Results   Component Value Date    B12 474 10/10/2024    VITB12 496 07/11/2015     Lab Results   Component Value Date    VITD 42.8 10/10/2024    APSB77YE 24.7 07/11/2015       Medications Ordered Prior to Encounter[1]    ASSESSMENT  Analyzed weight data:       Diagnoses and all orders for this visit:    Encounter for therapeutic drug level monitoring    Overweight (BMI 25.0-29.9)    Type 2 diabetes mellitus without retinopathy (HCC)    Hypercholesterolemia    BASILIA on CPAP        PLAN  Initial Weight: 204lbs  Initial Weight Date: 12/2/22  Today's Weight: 156lbs  5% Goal: 10.2lbs  10% Goal: 20.4lbs  Total Weight Loss: -0lbs/Net loss 48lbs    Will continue mounjaro - advised side effects and adverse effects of medication   DM - stable, continue current medications, low carb diet  HLD -  stable on current medication atorvastatin, will continue, will continue to work on lifestyle modifications  BASILIA - CPAP compliance  Consistency with logging foods - focus on protein and produce  Incorporate strength/resistance training  Nutrition: low carb diet/ recommended to eat breakfast daily/ regular protein intake  Medication use and side effects reviewed with patient.  Medication contraindications: contrave, vyvanse  Follow up with dietitian and psychologist as recommended.  Discussed the role of sleep and stress in weight management.  Counseled on comprehensive weight loss plan including attention to nutrition, exercise and behavior/stress management for success. See patient instruction below for more details.  Discussed strategies to overcome barriers to successful weight loss and weight maintenance  FITTE: ACSM recommendations (150-300 minutes/ week in active weight loss)   Weight Loss consent to treat reviewed and signed       NOTE TO PATIENT: The 21st Century Cures Act makes clinical notes like these available to patients in the interest of transparency. Clinical notes are medical documents used by physicians and care providers to communicate with each other. These documents include medical language and terminology, abbreviations, and treatment information that may sound technical and at times possibly unfamiliar. In addition, at times, the verbiage may appear blunt or direct. These documents are one tool providers use to communicate relevant information and clinical opinions of the care providers in a way that allows common understanding of the clinical context.     There are no Patient Instructions on file for this visit.    No follow-ups on file.    Patient verbalizes understanding.    Joan Leigh PA-C  5/2/2025           [1]   Current Outpatient Medications on File Prior to Visit   Medication Sig Dispense Refill    empagliflozin (JARDIANCE) 25 MG Oral Tab Take 1 tablet (25 mg total) by mouth  daily. TAKE 1 TABLET BY MOUTH DAILY 90 tablet 3    Tirzepatide (MOUNJARO) 15 MG/0.5ML Subcutaneous Solution Auto-injector Inject 15 mg into the skin once a week. 6 mL 1    atorvastatin 10 MG Oral Tab Take 1 tablet (10 mg total) by mouth nightly.      Meloxicam 15 MG Oral Tab Take 1 tablet (15 mg total) by mouth daily. 30 tablet 2    Cholecalciferol (VITAMIN D3) 1.25 MG (09022 UT) Oral Cap Take 1 capsule by mouth once a week. 12 capsule 3    fluticasone propionate 50 MCG/ACT Nasal Suspension 2 sprays by Nasal route daily. 48 g 3    levocetirizine 5 MG Oral Tab Take 1 tablet (5 mg total) by mouth every evening. 90 tablet 1    Estradiol (ESTRACE) 0.1 MG/GM Vaginal Cream Apply 1/2 gram vaginally 2 times per week. 1 Tube 3    cyclobenzaprine 10 MG Oral Tab Take 1 tablet (10 mg total) by mouth nightly as needed. (Patient not taking: Reported on 5/2/2025) 30 tablet 0    TRI-MALIKA 0.01-4-0.05 % External Cream Apply 1 Application topically 2 (two) times daily. (Patient not taking: Reported on 5/2/2025)       No current facility-administered medications on file prior to visit.

## 2025-05-31 ENCOUNTER — HOSPITAL ENCOUNTER (OUTPATIENT)
Age: 60
Discharge: HOME | End: 2025-05-31
Payer: COMMERCIAL

## 2025-05-31 VITALS
HEIGHT: 65 IN | HEART RATE: 85 BPM | TEMPERATURE: 98 F | BODY MASS INDEX: 24.99 KG/M2 | SYSTOLIC BLOOD PRESSURE: 118 MMHG | DIASTOLIC BLOOD PRESSURE: 77 MMHG | OXYGEN SATURATION: 97 % | RESPIRATION RATE: 16 BRPM | WEIGHT: 150 LBS

## 2025-05-31 DIAGNOSIS — N30.01 ACUTE CYSTITIS WITH HEMATURIA: Primary | ICD-10-CM

## 2025-05-31 LAB
BILIRUB UR QL STRIP: NEGATIVE
CLARITY UR: CLEAR
COLOR UR: YELLOW
GLUCOSE UR STRIP-MCNC: NEGATIVE MG/DL
KETONES UR STRIP-MCNC: NEGATIVE MG/DL
NITRITE UR QL STRIP: NEGATIVE
PH UR STRIP: 8 [PH]
PROT UR STRIP-MCNC: 100 MG/DL
SP GR UR STRIP: 1.02
UROBILINOGEN UR STRIP-ACNC: <2 MG/DL

## 2025-05-31 PROCEDURE — 81002 URINALYSIS NONAUTO W/O SCOPE: CPT | Performed by: NURSE PRACTITIONER

## 2025-05-31 PROCEDURE — 87186 SC STD MICRODIL/AGAR DIL: CPT | Performed by: NURSE PRACTITIONER

## 2025-05-31 PROCEDURE — 99214 OFFICE O/P EST MOD 30 MIN: CPT | Performed by: NURSE PRACTITIONER

## 2025-05-31 PROCEDURE — 87077 CULTURE AEROBIC IDENTIFY: CPT | Performed by: NURSE PRACTITIONER

## 2025-05-31 PROCEDURE — 87086 URINE CULTURE/COLONY COUNT: CPT | Performed by: NURSE PRACTITIONER

## 2025-05-31 RX ORDER — CEPHALEXIN 500 MG/1
500 CAPSULE ORAL 3 TIMES DAILY
Qty: 21 CAPSULE | Refills: 0 | Status: SHIPPED | OUTPATIENT
Start: 2025-05-31 | End: 2025-06-07

## 2025-05-31 NOTE — DISCHARGE INSTRUCTIONS
Please take antibiotics as prescribed.  Over-the-counter Azo as discussed.  Stay hydrated.  PCP follow-up as needed

## 2025-05-31 NOTE — ED PROVIDER NOTES
Patient Seen in: [facility]        History  Chief Complaint   Patient presents with    Urinary Symptoms     Entered by patient     Stated Complaint: Urinary Symptoms    Subjective:   This is a 59-year-old female with below stated medical history.  Presents to immediate care for UTI symptoms.  Reports dysuria, urinary frequency and noted some mild hematuria yesterday.  No abdominal pain, fevers, or flank pain.  No nausea, vomiting, diarrhea.  No vaginal discharge or concern for STI.  No treatment attempted prior to arrival.     The history is provided by the patient.                     Objective:     Past Medical History:    Allergic rhinitis    Anesthesia complication    Biliary calculus    Diabetes (HCC)    Difficult intubation    Difficulty opening mouth wide during general anesthesia    Esophageal reflux    Hyperlipidemia    Lipid screening    per NG    Other ill-defined conditions(799.89)    per [employer]:  \"Abnormal pap smear; Management:  colposcopy x3\"    Sleep apnea    doest use mask    Type 2 diabetes mellitus without retinopathy (HCC)    Vitamin D deficiency              Past Surgical History:   Procedure Laterality Date    Cholecystectomy      Colonoscopy      Colonoscopy N/A 2018    few diverticula    Egd  2018    small HH, H Pylori gastritis    Egd      Ercp duct stent placement  2014    bile leak    Hysterectomy      partial hystero.    Other surgical history      Bladder surgery                Social History     Socioeconomic History    Marital status:    Tobacco Use    Smoking status: Former     Current packs/day: 0.00     Types: Cigarettes     Start date: 1999     Quit date: 2012     Years since quittin.8    Smokeless tobacco: Never   Vaping Use    Vaping status: Never Used   Substance and Sexual Activity    Alcohol use: Not Currently     Comment: socially    Drug use: Never    Sexual activity: Yes     Partners: Male     Birth  control/protection: Hysterectomy   Other Topics Concern    Caffeine Concern Yes     Comment: Coffee: 1 cup daily    Exercise Yes     Comment: Walking    Seat Belt Yes    Pt has a pacemaker No    Pt has a defibrillator No    Reaction to local anesthetic No   Social History Narrative    The patient does not use an assistive device..      The patient does not live in a home with stairs.     Social Drivers of Health     Transportation Needs: No Transportation Needs (9/30/2021)    Received from [facility]     Lack of Transportation (Medical): No     Lack of Transportation (Non-Medical): No    [facility]    Housing Stability              Review of Systems   Constitutional:  Negative for chills and fever.   HENT:  Negative for congestion and sore throat.    Respiratory:  Negative for cough.    Cardiovascular:  Negative for chest pain, palpitations and leg swelling.   Gastrointestinal:  Negative for abdominal pain, constipation, diarrhea, nausea and vomiting.   Genitourinary:  Positive for dysuria, frequency, hematuria and urgency. Negative for flank pain, pelvic pain, vaginal bleeding and vaginal discharge.   Musculoskeletal:  Negative for back pain, neck pain and neck stiffness.   Skin:  Negative for rash.   Neurological:  Negative for headaches.       Positive for stated complaint: Urinary Symptoms  Other systems are as noted in HPI.  Constitutional and vital signs reviewed.      All other systems reviewed and negative except as noted above.                  Physical Exam    ED Triage Vitals [05/31/25 1126]   /77   Pulse 85   Resp 16   Temp 97.9 °F (36.6 °C)   Temp src Oral   SpO2 97 %   O2 Device None (Room air)       Current Vitals:   Vital Signs  BP: 118/77  Pulse: 85  Resp: 16  Temp: 97.9 °F (36.6 °C)  Temp src: Oral    Oxygen Therapy  SpO2: 97 %  O2 Device: None (Room air)            Physical Exam  Vitals and nursing note reviewed.    Constitutional:       General: She is not in acute distress.     Appearance: Normal appearance. She is not ill-appearing, toxic-appearing or diaphoretic.   HENT:      Head: Normocephalic and atraumatic.      Right Ear: External ear normal.      Left Ear: External ear normal.      Nose: Nose normal.      Mouth/Throat:      Mouth: Mucous membranes are moist.      Pharynx: Oropharynx is clear.   Eyes:      General:         Right eye: No discharge.         Left eye: No discharge.      Extraocular Movements: Extraocular movements intact.      Conjunctiva/sclera: Conjunctivae normal.   Cardiovascular:      Rate and Rhythm: Normal rate.      Heart sounds: Normal heart sounds.   Pulmonary:      Effort: Pulmonary effort is normal.   Musculoskeletal:      Cervical back: Neck supple.   Skin:     General: Skin is warm and dry.      Capillary Refill: Capillary refill takes less than 2 seconds.      Findings: No rash.   Neurological:      Mental Status: She is alert and oriented to person, place, and time.   Psychiatric:         Mood and Affect: Mood normal.         Behavior: Behavior normal.                 ED Course  Labs Reviewed   [facility] POCT URINALYSIS DIPSTICK - Abnormal; Notable for the following components:       Result Value    Protein urine 100 (*)     Blood, Urine Large (*)     Leukocyte esterase urine Small (*)     All other components within normal limits   URINE CULTURE, ROUTINE          UA/UC                  MDM     Vital signs stable.  Patient is well-appearing and nontoxic looking.  Presents to immediate care for UTI symptoms.      Differential diagnosis include but limited to UTI, pyelonephritis, vaginitis, STI, less likely acute abdomen    Reports no fevers, flank pain, abdominal pain, nausea, vomiting, or diarrhea.  No concern for acute abdomen or pyelonephritis.  States she has no concern for STI.  UA shows small leuks and large blood consistent with UTI.  Urine was sent for culture.      [facility].  Course of  cephalexin prescribed.  Azo over-the-counter as directed.  The importance for hydration discussed.  Follow-up with PCP in 1 week.  Reasons to seek emergent treatment reinforced.  Patient verbalized understanding, and agreed to plan of care.  All questions answered.          Medical Decision Making      Disposition and Plan     Clinical Impression:  1. Acute cystitis with hematuria         Disposition:  Discharge  5/31/2025 11:41 am    Follow-up:  Brittanie Preciado MD  [address] [place] [id number]  [phone]      As needed          Medications Prescribed:  Current Discharge Medication List        START taking these medications    Details   cephALEXin 500 MG Oral Cap Take 1 capsule (500 mg total) by mouth 3 (three) times daily for 7 days.  Qty: 21 capsule, Refills: 0                   Supplementary Documentation:

## 2025-08-21 DIAGNOSIS — G47.33 OSA ON CPAP: ICD-10-CM

## 2025-08-21 DIAGNOSIS — E78.00 HYPERCHOLESTEROLEMIA: ICD-10-CM

## 2025-08-21 DIAGNOSIS — Z51.81 ENCOUNTER FOR THERAPEUTIC DRUG LEVEL MONITORING: ICD-10-CM

## 2025-08-21 DIAGNOSIS — E66.3 OVERWEIGHT (BMI 25.0-29.9): ICD-10-CM

## 2025-08-21 DIAGNOSIS — E11.9 TYPE 2 DIABETES MELLITUS WITHOUT RETINOPATHY (HCC): ICD-10-CM

## 2025-08-21 RX ORDER — TIRZEPATIDE 15 MG/.5ML
15 INJECTION, SOLUTION SUBCUTANEOUS WEEKLY
Qty: 6 ML | Refills: 1 | Status: SHIPPED | OUTPATIENT
Start: 2025-08-21

## (undated) DEVICE — Device: Brand: DEFENDO AIR/WATER/SUCTION AND BIOPSY VALVE

## (undated) DEVICE — MEDI-VAC NON-CONDUCTIVE SUCTION TUBING: Brand: CARDINAL HEALTH

## (undated) DEVICE — GAMMEX® PI HYBRID SIZE 6, STERILE POWDER-FREE SURGICAL GLOVE, POLYISOPRENE AND NEOPRENE BLEND: Brand: GAMMEX

## (undated) DEVICE — #15 STERILE STAINLESS BLADE: Brand: STERILE STAINLESS BLADES

## (undated) DEVICE — SOL H2O IV

## (undated) DEVICE — FORCEP RADIAL JAW 4

## (undated) DEVICE — SUTURE VICRYL 0 CT-1

## (undated) DEVICE — CAUTERY PENCIL

## (undated) DEVICE — GAMMEX® PI HYBRID SIZE 6.5, STERILE POWDER-FREE SURGICAL GLOVE, POLYISOPRENE AND NEOPRENE BLEND: Brand: GAMMEX

## (undated) DEVICE — SOL  .9 1000ML BTL

## (undated) DEVICE — ENDOSCOPY PACK UPPER: Brand: MEDLINE INDUSTRIES, INC.

## (undated) DEVICE — TUBING CYSTO

## (undated) DEVICE — STANDARD HYPODERMIC NEEDLE,POLYPROPYLENE HUB: Brand: MONOJECT

## (undated) DEVICE — RETRACTOR LONE STAR STAYS LG

## (undated) DEVICE — COVER,MAYO STAND,STERILE: Brand: MEDLINE

## (undated) DEVICE — BULB SYRINGE,IRRIGATION WITH PROTECTIVE CAP: Brand: DOVER

## (undated) DEVICE — REM POLYHESIVE ADULT PATIENT RETURN ELECTRODE: Brand: VALLEYLAB

## (undated) DEVICE — KENDALL SCD EXPRESS SLEEVES, KNEE LENGTH, MEDIUM: Brand: KENDALL SCD

## (undated) DEVICE — VIOLET BRAIDED (POLYGLACTIN 910), SYNTHETIC ABSORBABLE SUTURE: Brand: COATED VICRYL

## (undated) DEVICE — GYN CDS: Brand: MEDLINE INDUSTRIES, INC.

## (undated) DEVICE — ENDOSCOPY PACK - LOWER: Brand: MEDLINE INDUSTRIES, INC.

## (undated) NOTE — MR AVS SNAPSHOT
[facility]  [address] [id number] [phone]               Thank you for choosing us for [facility] We are glad to serve you and happy to provide you with this summary of your visit.   Pl Reason for Today's Visit     Medication Follow-Up           Medical Issues Discussed Today     Heartburn    -  Primary    Current use of proton pump inhibitor          Instructions and Information about Your Health    1.  Take the Pantoprazole each day that - Pantoprazole Sodium 40 MG [facility]            Health Goals discussed Today        Last Edited       Therapy Goals 6/5/2017  7:29 PM by Nicole Cardenas, PT     Goal Comments    1.  Pt will verbalize and demo compliance with HEP at least 75% of the time

## (undated) NOTE — ED AVS SNAPSHOT
[facility] Emergency Department    [address]     [address] [place] [address]    Phone:  [phone]    Fax:  [phone]           Liset Bloom   MRN: [id number]    Department:  [facility] Emergency Department   Date of Visit:  3/1 doctor. Please ask your health care provider, pharmacist or nurse if you have any questions regarding your home medications, including potential side effects.               Medication List      START taking these medications     Butalbital-APAP-Caffeine 50- return to your personal doctor) about any new or lasting problems. The primary care or specialist physician may see patients referred from the [facility] Emergency Department. Follow-up care is at the discretion of that Physician.   If you n Kai Brenner [address] ([address] [place] [address]) [phone]   Charm [facility] [address] ([facility]) [address] [facility] [address] [address]  ([place] [address]) [phone]                Additional Information

## (undated) NOTE — LETTER
11/10/2020        Jamal Monk 1942          Dear Li King,       Here are your results from your recent visit with Gastroenterology.      Lab tests did not show any other causes for your liver test abnormality o

## (undated) NOTE — LETTER
[facility]    Consent for Coronary CT Angiography    Your doctor has recommended that you have a Coronary CT Angiography procedure.  Coronary CT Angiography is a diagnostic procedure using computed tomography to scan the can range from very minor to very serious leading to a life threatening situation or even death. Please be sure to communicate any allergy you may have to your caregiver immediately.     The information that is obtained during your testing will be treated a

## (undated) NOTE — ED AVS SNAPSHOT
[facility] Emergency Department    [address]     [address] [place] [address]    Phone:  [phone]    Fax:  [phone]           Luul Bobby   MRN: [id number]    Department:  [facility] Emergency Department   Date of Visit:  3/1 and Class Registration line at [phone] or find a doctor online by visiting [email or web address].    IF THERE IS ANY CHANGE OR WORSENING OF YOUR CONDITION, CALL YOUR PRIMARY CARE PHYSICIAN AT ONCE OR RETURN IMMEDIATELY TO [address] [place] [address].     If

## (undated) NOTE — MR AVS SNAPSHOT
[address]  [address] [facility]  [phone]               Thank you for choosing us for your health care visit with Arun Krause MD.  We are glad to serve you and happy to provide you with this summa 133/88 mmHg 97 97.8 °F (36.6 °C) (Oral) 5' 5\" (1.651 m) 180 lb 6.4 oz (81.829 kg) 30.02 kg/m2         Current Medications          This list is accurate as of: 3/20/17  3:38 PM.  Always use your most recent med list.                Cholecalciferol 17134 Visit [facility] online at  [email or web address]

## (undated) NOTE — LETTER
2/12/2019              Graciela Coreas        [address] [facility]         Dear Sonal Jeffrey,    It was a pleasure to see you.   Your Chlamydia and Gonorrhea cultures were normal.  There is no need for further testing at this ti

## (undated) NOTE — LETTER
AUTHORIZATION FOR SURGICAL OPERATION OR OTHER PROCEDURE    1. I hereby authorize Dr. Tyronne Spatz and the [facility] staff assigned to my case to perform the following operation and/or procedure at the [facility]:    Left trochanteric bursa injection    2. My physician has explained the nature and purpose of the operation or other procedure, possible alternative methods of treatment, the risks involved, and the possibility of complication to me. I acknowledge that no guarantee has been made as to the result that may be obtained. 3.  I recognize that, during the course of this operation, or other procedure, unforseen conditions may necessitate additional or different procedure than those listed above. I, therefore, further authorize and request that the above named physician, his/her physician assistants or designees perform such procedures as are, in his/her professional opinion, necessary and desirable. 4.  Any tissue or organs removed in the operation or other procedure may be disposed of by and at the discretion of the [facility] staff and [facility]. 5.  I understand that in the event of a medical emergency, I will be transported by local paramedics to [facility] or other hospital emergency department. 6.  I certify that I have read and fully understand the above consent to operation and/or other procedure. 7.  I acknowledge that my physician has explained sedation/analgesia administration to me including the risks and benefits. I consent to the administration of sedation/analgesia as may be necessary or desirable in the judgement of my physician. Witness signature: ___________________________________________________ Date:  ______/______/_____                    Time:  ________ A. M.  P.M.        Patient Name: Miranda Reynaga  8/1/1965  [id number]         Patient signature:  ___________________________________________________              Statement of Physician  My signature below affirms that prior to the time of the procedure, I have explained to the patient and/or his/her guardian, the risks and benefits involved in the proposed treatment and any reasonable alternative to the proposed treatment. I have also explained the risks and benefits involved in the refusal of the proposed treatment and have answered the patient's questions.                         Date:  ______/______/_______  Provider                      Signature:  __________________________________________________________       Time:  ___________ A.M    P.M.

## (undated) NOTE — LETTER
Date: 2023      Patient Name: Kailash Pena      : 1965        Thank you for choosing [address] as your health care provider. Your physician has deemed the following medical service(s) necessary. However, your insurance plan may not pay for all of your health care and costs and may deny payment for this service. The fact that your insurance plan does not pay for an item or service does not mean you should not receive it. The purpose of this form is to help you make an informed decision about whether or not you want to receive this service(s) that may not be paid for by your insurance plan. CPT Code Description     Cost     _Left Subacromial CSI  $1200.00    I understand that the above mentioned service(s) or supply may not be covered by my insurance company.  I agree to be financially responsible for the cost of this service or supply in the event of my insurance denies payment as a non-covered benefit.        ______________________________________________________________________  Signature of Patient or Patient's Representative  Relationship  Date    ______________________________________________________________________  Signature of Witness to signing of form   Printed Name

## (undated) NOTE — LETTER
3/20/2022    Patient: Rimma Israel    MR Number: [id number]   YOB: 1965   Date of Visit: 03/16/22       Dear Judith Pham MD     Thank you for referring Rimma Israel for ophthalmology examination. Rimma Israel reported a history of type 2 diabetes mellitus. Exam was notable for best corrected visual acuity of 20/25 both eyes and normal intraocular pressures, pupils, extraocular motility, and visual fields by confrontation. Eye exam showed no diabetic eye disease . In summary, Rimma Israel has no diabetic eye disease . I recommend annual follow up . Thank you again for your referral. I am always happy to see your patients for routine or urgent consultation. Please contact my office if you have questions regarding this patient. Sincerely,                        Henrique Del Castillo MD, [address], [address]  (P): [phone] ; (F): [phone]      Document electronically generated by:   Henrique Del Castillo

## (undated) NOTE — LETTER
2/2/2018    Abhijit Garcia [address]            Dear Abhijit Molina,      Our records indicate that you are due for an appointment for a Colonoscopy on or about MArch 2018 with Bigg Hui MD.    Roxane Orta

## (undated) NOTE — MR AVS SNAPSHOT
[address]  1200 [facility]  [phone]               Thank you for choosing us for your health care visit with Vannesa Gordon MD.  We are glad to serve you and happy to provide you with this sum[facility]  Diagnostics Main [facility] Parking) (Yellow Parking)  [address]   [address] [facility],  Pavel Kike [place], 1004 [facility]  [address]  [address] [place] [address]   Phone:  [phone]   Fax:  [phone]    Diagnoses:  Paresthesia   Shoulder tendonitis, right   Order:  Physical Therapy - Internal        Comment:  Treatment: Evaluate & Treat    Physician goals: Reason for Today's Visit     Pain           Medical Issues Discussed Today     Shoulder tendonitis, right    -  Primary    Paresthesia          Instructions and Information about Your Health     None      Allergies as of May 22, 2017     Clindamycin Rash office, you can view your past visit information in Oversee by going to Visits < Visit Summaries. Oversee questions? Call [phone] for help. Oversee is NOT to be used for urgent needs. For medical emergencies, dial 911.            Visit EDWARD-EL

## (undated) NOTE — LETTER
[facility] [place]  [address]  1570 [facility] [address]  Dept: [phone]  Dept Fax: [phone]  Loc: [phone]      July 10, 2017    Patient: Heriberto Welsh   Date of Visit: 7/10/2017       To Whom It May Concern:    Marya Jackson

## (undated) NOTE — LETTER
Date: 2023      Patient Name: Wilton Ballard      : 1965        Thank you for choosing  [facility] as your health care provider. Your physician has deemed the following medical service(s) necessary. However, your insurance plan may not pay for all of your health care and costs and may deny payment for this service. The fact that your insurance plan does not pay for an item or service does not mean you should not receive it. The purpose of this form is to help you make an informed decision about whether or not you want to receive this service(s) that may not be paid for by your insurance plan. CPT Code Description     Cost     _________ ______________________________  _____________      _________ ______________________________ _____________      _________ ______________________________ _____________      I understand that the above mentioned service(s) or supply may not be covered by my insurance company.  I agree to be financially responsible for the cost of this service or supply in the event of my insurance denies payment as a non-covered benefit.        ______________________________________________________________________  Signature of Patient or Patient's Representative  Relationship  Date    ______________________________________________________________________  Signature of Witness to signing of form   Printed Name

## (undated) NOTE — MR AVS SNAPSHOT
[address]  150 [facility] [phone]               Thank you for choosing us for your health care visit with Renald Mohs, PTA.   We are glad to serve you and happy to provide you with this summ [facility] By Therapist with Federico Troncoso PTA   [facility] in [address] [place] [address] ([address] [facility],[address])    150 [facility] [phone]           Please arrive at your scheduled appointment time.   We

## (undated) NOTE — MR AVS SNAPSHOT
[facility]  [address], 901 [facility]  1990 [facility] [phone]               Thank you for choosing us for your health care visit with Bouchra Muse MD.  We are glad to serve you and happy to provide you with this summary of your Take 50,000 Units by mouth every 7 days. Commonly known as:  DIALYVITE VITAMIN D3 MAX           Cyclobenzaprine HCl 5 MG Tabs   Take 1 tablet (5 mg total) by mouth nightly.    What changed:    - when to take this  - reasons to take this   Commonly known a

## (undated) NOTE — LETTER
AUTHORIZATION FOR SURGICAL OPERATION OR OTHER PROCEDURE    1. I hereby authorize Dr. Kendall Boyd and the [facility] Office staff assigned to my case to perform the following operation and/or procedure at the [facility] Office:    L subacromial CSI    2. My physician has explained the nature and purpose of the operation or other procedure, possible alternative methods of treatment, the risks involved, and the possibility of complication to me. I acknowledge that no guarantee has been made as to the result that may be obtained. 3.  I recognize that, during the course of this operation, or other procedure, unforseen conditions may necessitate additional or different procedure than those listed above. I, therefore, further authorize and request that the above named physician, his/her physician assistants or designees perform such procedures as are, in his/her professional opinion, necessary and desirable. 4.  Any tissue or organs removed in the operation or other procedure may be disposed of by and at the discretion of the [facility] Office staff and [facility] AT [facility]. 5.  I understand that in the event of a medical emergency, I will be transported by local paramedics to [facility] or other hospital emergency department. 6.  I certify that I have read and fully understand the above consent to operation and/or other procedure. 7.  I acknowledge that my physician has explained sedation/analgesia administration to me including the risks and benefits. I consent to the administration of sedation/analgesia as may be necessary or desirable in the judgement of my physician. Witness signature: ___________________________________________________ Date:  ______/______/_____                    Time:  ________ A. M.  P.M.        Patient Name:  Yasmin Todd  8/1/1965  [id number]         Patient signature:  ___________________________________________________                 Statement of Physician  My signature below affirms that prior to the time of the procedure, I have explained to the patient and/or his/her guardian, the risks and benefits involved in the proposed treatment and any reasonable alternative to the proposed treatment. I have also explained the risks and benefits involved in the refusal of the proposed treatment and have answered the patient's questions.                         Date:  ______/______/_______  Provider                      Signature:  __________________________________________________________       Time:  ___________ A.M    P.M.

## (undated) NOTE — MR AVS SNAPSHOT
[address]  150 [facility] [address]  [phone]  [phone]               Thank you for choosing us for your health care visit with Ranjit Rosales PT.   We are glad to serve you and happy to provide you with this s [place] [facility] Visit By Therapist with [facility] in [address] [place] [address] (9500 [facility][note 2],[address])    150 [facility] [phone]           Please arrive at your scheduled appointment time.   We

## (undated) NOTE — LETTER
Date & Time: 3/24/2021, 9:00 AM  Patient: Gabo Kelley  Encounter Provider(s):    Navarro Artis MD       To Whom It May Concern:    Gabo Kelley was seen and treated in our department on 3/24/2021.  She had a negative COVID-19 test and

## (undated) NOTE — Clinical Note
I saw Estefania Minor today, I can't see a clear mesh erosion, but there is granulation tissue and I suspect an erosion under that. She also c/o mixed urinary incontinence. I will start with urodynamics and work to manage her symptoms.  I appreciate the opportuni

## (undated) NOTE — LETTER
07/05/22        Miranda Ronnell  [address] [place] [address]      Dear Link Pruett,    50[facility] records indicate that you have outstanding lab work and or testing that was ordered for you and has not yet been completed:  Orders Placed This Encounter      Lipid Panel [E]      Hepatic Function Panel (7) [E]    To provide you with the best possible care, please complete these orders at your earliest convenience. If you have recently completed these orders please disregard this letter. If you have any questions please call the office at Dept: [phone].      Thank you,       Merry Hammond MD

## (undated) NOTE — MR AVS SNAPSHOT
[address] [facility]  701 [facility] [id number] [phone]               Thank you for choosing us for your health care visit with Regino Shabazz.  Leeann Connor MD.  We are glad to serve you and happy to provide you with this summary of your visit may be held responsible for payment in full if proper authorization is not acquired. Please contact the Patient Business Office at [phone] if you have   any questions related to insurance coverage. Thank you.          Reason for Today's Visit     Gyn Avoid over sized portions. Don’t eat while when you’re bored.      EAT THESE FOODS MORE OFTEN: EAT THESE FOODS LESS OFTEN:   Make half your plate fruits and vegetables Highly refined, white starches including white bread, rice and pasta   Eat plenty of pr

## (undated) NOTE — LETTER
11/9/2020        Jorje Monk 1942            Dear Isom Habermann,     Here are the results from your recent testing :    Imaging Orders:  Ultrasound showed excess fat in the liver.     Results and Recommendations :

## (undated) NOTE — MR AVS SNAPSHOT
After Visit Summary   1/25/2021    Graciela Coreas    MRN: [id number]           Visit Information     Date & Time  1/25/2021  4:00 PM Provider  Bogdan Brennan [address], 952 [facility]  Dept.  Phone  [phone] URINE CULTURE, ROUTINE [[id number] CUSTOM]     Future Labs/Procedures Expected by Expires    GENITAL VAGINOSIS SCREEN [[id number] CUSTOM]  1/25/2021 (Approximate) 1/25/2022    [facility] ANGEL 2D+3D SCREENING BILAT (CPT=77067/18909) [COMBO CPT(R)]  1/25/2021 (Approxima If you receive a survey from [employer], please take a few minutes to complete it and provide feedback. We strive to deliver the best patient experience and are looking for ways to make improvements. Your feedback will help us do so.  For more information EMERGENCY ROOM Life-threatening emergencies needing immediate intervention at a hospital emergency room.  Average cost  $2,300*   *Cost varies based on your insurance coverage  For more information about hours, locations or appointment options available at

## (undated) NOTE — LETTER
Date: 2023      Patient Name: Ruta Mcardle      : 1965        Thank you for choosing  [facility] your [facility] care provider. Your physician has deemed the following medical service(s) necessary. However, your insurance plan may not pay for all of your health care and costs and may deny payment for this service. The fact that your insurance plan does not pay for an item or service does not mean you should not receive it. The purpose of this form is to help you make an informed decision about whether or not you want to receive this service(s) that may not be paid for by your insurance plan. CPT Code Description     Cost     Left trochanteric bursa injection  $1200.00      I understand that the above mentioned service(s) or supply may not be covered by my insurance company.  I agree to be financially responsible for the cost of this service or supply in the event of my insurance denies payment as a non-covered benefit.        ______________________________________________________________________  Signature of Patient or Patient's Representative  Relationship  Date    ______________________________________________________________________  Signature of Witness to signing of form   Printed Name

## (undated) NOTE — LETTER
February 4, 2021    Randee Woods, [address]  [address] [facility]     Patient: Jorje Damon   YOB: 1965   Date of Visit: 2/4/2021       Dear Dr. Bj Padilla MD:    Thank you for referring Jorje Damon to me fo Uterus Cancer; age 37 cause of death   • Glaucoma Neg    • Macular degeneration Neg        Social History: Social History    Tobacco Use      Smoking status: Former Smoker        Packs/day: 0.10        Years: 8.00        Pack years: .8        Types: C Base Eye Exam     Visual Acuity (Snellen - Linear)       Right Left    Dist cc 20/20 -2 20/20 -1    Near cc 20/20 20/20    Correction: Glasses   Pt has old glasses with.    She has a new Rx from December 2020           Tonometry ([facility], 3:45 PM)       Right Continue aggressive warm compresses. Chalazion left lower eyelid  Recommend aggressive warm compresses; patient should use them 20-30 times per day. Information on chalazia given. Told patient that it is not big enough to excise at this time.   Discu

## (undated) NOTE — LETTER
01/08/21        Hang Monk 1942      Dear Charles Alvarado,    [facility] records indicate that you have outstanding lab work and or testing that was ordered for you and has not yet been completed:  Orders Placed This [place]

## (undated) NOTE — LETTER
10/12/2020        Edd De Anda3          Dear [facility],       Here are your results from your recent visit with Gastroenterology.      Stool testing was negative for an infection in your stomach called helico

## (undated) NOTE — LETTER
[address]  [address] [place] [address]  [address]  [address] [place] [address]: [phone]  FAX: [phone]   Consent to Procedure/Sedation    Date: __3/11/2019_____    Time: ___2:23 PM ___    1.  I authorize the performance ___________________________    Signature of person authorized to consent for patient: Relationship to patient:  ___________________________    ___________________    Witness: ____________________     Date: ______________    Printed: 3/11/2019   2:23 PM

## (undated) NOTE — LETTER
10/4/2017              Danniia Oregon        [address]         Dear Tod Zimmer,      It was a pleasure to see you at our [address]  office.   Your lab tests were normal.  There

## (undated) NOTE — LETTER
AUTHORIZATION FOR SURGICAL OPERATION OR OTHER PROCEDURE    1. I hereby authorize Dr. Jessenia Damico and the [facility] staff assigned to my case to perform the following operation and/or procedure at the [facility] Office:    Left knee Durolane and CSI under ultrasound guidance    2. My physician has explained the nature and purpose of the operation or other procedure, possible alternative methods of treatment, the risks involved, and the possibility of complication to me. I acknowledge that no guarantee has been made as to the result that may be obtained. 3.  I recognize that, during the course of this operation, or other procedure, unforseen conditions may necessitate additional or different procedure than those listed above. I, therefore, further authorize and request that the above named physician, his/her physician assistants or designees perform such procedures as are, in his/her professional opinion, necessary and desirable. 4.  Any tissue or organs removed in the operation or other procedure may be disposed of by and at the discretion of the [facility] Office staff and [facility] AT [facility]. 5.  I understand that in the event of a medical emergency, I will be transported by local paramedics to [facility] or other hospital emergency department. 6.  I certify that I have read and fully understand the above consent to operation and/or other procedure. 7.  I acknowledge that my physician has explained sedation/analgesia administration to me including the risks and benefits. I consent to the administration of sedation/analgesia as may be necessary or desirable in the judgement of my physician. Witness signature: ___________________________________________________ Date:  ______/______/_____                    Time:  ________ A. M.  P.M.        Patient Name:  Dedrick Rey  8/1/1965  [id number]         Patient signature: ___________________________________________________                 Statement of Physician  My signature below affirms that prior to the time of the procedure, I have explained to the patient and/or his/her guardian, the risks and benefits involved in the proposed treatment and any reasonable alternative to the proposed treatment. I have also explained the risks and benefits involved in the refusal of the proposed treatment and have answered the patient's questions.                         Date:  ______/______/_______  Provider                      Signature:  __________________________________________________________       Time:  ___________ ASABA ACKERMAN

## (undated) NOTE — LETTER
Date: 2023      Patient Name: Rimma Israel      : 1965        Thank you for choosing [address] as your health care provider. Your physician has deemed the following medical service(s) necessary. However, your insurance plan may not pay for all of your health care and costs and may deny payment for this service. The fact that your insurance plan does not pay for an item or service does not mean you should not receive it. The purpose of this form is to help you make an informed decision about whether or not you want to receive this service(s) that may not be paid for by your insurance plan. CPT Code Description     Cost     _________ LEFT grater trochanteric bursal CSI under ultrasound guidance      _________ ______________________________ _____________      _________ ______________________________ _____________      I understand that the above mentioned service(s) or supply may not be covered by my insurance company.  I agree to be financially responsible for the cost of this service or supply in the event of my insurance denies payment as a non-covered benefit.        ______________________________________________________________________  Signature of Patient or Patient's Representative  Relationship  Date    ______________________________________________________________________  Signature of Witness to signing of form   Printed Name

## (undated) NOTE — LETTER
10/05/22        Kaitlyn Sedrick  [address] [place] [address]      Dear Andre Darby,    [facility] records indicate that you have outstanding lab work and or testing that was ordered for you and has not yet been completed:  Orders Placed This Encounter      Lipid Panel [E]      Hepatic Function Panel (7) [E]    To provide you with the best possible care, please complete these orders at your earliest convenience. If you have recently completed these orders please disregard this letter. If you have any questions please call the office at Dept: [phone].      Thank you,       Smita Mustafa MD

## (undated) NOTE — LETTER
AUTHORIZATION FOR SURGICAL OPERATION OR OTHER PROCEDURE    1. I hereby authorize Dr. Guido Farooq and the [facility] Office staff assigned to my case to perform the following operation and/or procedure at the [facility] Office:    LEFT grater trochanteric bursal CSI under ultrasound guidance    2. My physician has explained the nature and purpose of the operation or other procedure, possible alternative methods of treatment, the risks involved, and the possibility of complication to me. I acknowledge that no guarantee has been made as to the result that may be obtained. 3.  I recognize that, during the course of this operation, or other procedure, unforseen conditions may necessitate additional or different procedure than those listed above. I, therefore, further authorize and request that the above named physician, his/her physician assistants or designees perform such procedures as are, in his/her professional opinion, necessary and desirable. 4.  Any tissue or organs removed in the operation or other procedure may be disposed of by and at the discretion of the [facility] Office staff and [facility] AT [facility]. 5.  I understand that in the event of a medical emergency, I will be transported by local paramedics to [facility] or other [facility] emergency department. 6.  I certify that I have read and fully understand the above consent to operation and/or other procedure. 7.  I acknowledge that my physician has explained sedation/analgesia administration to me including the risks and benefits. I consent to the administration of sedation/analgesia as may be necessary or desirable in the judgement of my physician. Witness signature: ___________________________________________________ Date:  ______/______/_____                    Time:  ________ A. M.  P.M.        Patient Name:  Antonella Sheehan  8/1/1965  [id number]         Patient signature: ___________________________________________________               Statement of Physician  My signature below affirms that prior to the time of the procedure, I have explained to the patient and/or his/her guardian, the risks and benefits involved in the proposed treatment and any reasonable alternative to the proposed treatment. I have also explained the risks and benefits involved in the refusal of the proposed treatment and have answered the patient's questions.                         Date:  ______/______/_______  Provider                      Signature:  __________________________________________________________       Time:  ___________ ASABA ACKERMAN

## (undated) NOTE — Clinical Note
Hello,  I met with [facility] in clinic today for weight loss/management. I have recommended intensive lifestyle/behavioral modifications for weight loss.  In addition, I have recommended she meet with our dietician and start weight loss medicine to help sup

## (undated) NOTE — MR AVS SNAPSHOT
[address]  150 [facility] [address]  [phone]  [phone]               Thank you for choosing us for your health care visit with Kwame Oscar PT.   We are glad to serve you and happy to provide you with this s loose fitting clothing.             Jun 13, 2017  2:30 PM   Exam - Established Patient with Billy Quinones MD   [address] [place], [place] [address]    2010 [facility], [address] [place] [address] [phone]

## (undated) NOTE — MR AVS SNAPSHOT
[address]  [address] [place] [id number] [phone]               Thank you for choosing us for your health care visit with Dayana Acosta PTA.   We are glad to serve you and happy to provide you with this summ [facility] in [facility] [address] ([address] [facility],[address])    [address] [place] [address]           Please arrive at your scheduled appointment time. Wear comfortable, loose fitting clothing.             Jun 29, 2017  4:45 PM

## (undated) NOTE — ED AVS SNAPSHOT
Ute Carrington   MRN: [id number]    Department:  [facility] Emergency Department   Date of Visit:  5/9/2018           Disclosure     Insurance plans vary and the physician(s) referred by the ER may not be covered by your plan.  Please contact CARE PHYSICIAN AT ONCE OR RETURN IMMEDIATELY TO THE EMERGENCY DEPARTMENT. If you have been prescribed any medication(s), please fill your prescription right away and begin taking the medication(s) as directed.   If you believe that any of the medications

## (undated) NOTE — LETTER
Dear Dr. Arguello Ip    This letter is to inform you that Wili Stephenson has been attending Physical Therapy with me. See below for my most recent plan of care.      Patient Name: Wili Stephenson YOB: 1965, MRN: [id number]   Date:  2017  ANNELIESE Abduction: R 4/5; L 5/5  ER: R 4-/5; L 4/5  IR: R 4+/5; L 5/5 Flexion: R 4+/5; L 5/5  Extension: R 4+/5; L 5/5 Rhomboids: R 4-/5, L 4/5  Mid trap: R 4-/5; L 4/5   Upper trap: R 5/5, L 5/5  Low trap: R 4-/5; L 4/5       Special tests:   Neer's Impingement: